# Patient Record
Sex: FEMALE | Race: WHITE | Employment: OTHER | URBAN - METROPOLITAN AREA
[De-identification: names, ages, dates, MRNs, and addresses within clinical notes are randomized per-mention and may not be internally consistent; named-entity substitution may affect disease eponyms.]

---

## 2017-01-02 ENCOUNTER — OFFICE VISIT (OUTPATIENT)
Dept: INTERNAL MEDICINE | Facility: CLINIC | Age: 72
End: 2017-01-02
Payer: COMMERCIAL

## 2017-01-02 VITALS
SYSTOLIC BLOOD PRESSURE: 110 MMHG | DIASTOLIC BLOOD PRESSURE: 70 MMHG | WEIGHT: 159 LBS | OXYGEN SATURATION: 98 % | TEMPERATURE: 98 F | HEIGHT: 66 IN | HEART RATE: 83 BPM | BODY MASS INDEX: 25.55 KG/M2

## 2017-01-02 DIAGNOSIS — Z01.818 PREOPERATIVE EXAMINATION: Primary | ICD-10-CM

## 2017-01-02 PROCEDURE — 99214 OFFICE O/P EST MOD 30 MIN: CPT | Performed by: INTERNAL MEDICINE

## 2017-01-02 NOTE — PROGRESS NOTES
[place] [address]  [facility] [address]  [phone]  Dept: [phone]    PRE-OP EVALUATION:  Today's date: 2017    Emma Jenkins (: 1945) presents for pre-operative evaluation assessment as requested by Dr. Holt.  She requires evaluation and anesthesia risk assessment prior to undergoing surgery/procedure for treatment of eye .  Proposed procedure: BILATERAL BLEPHAROPLASTY     Date of Surgery/ Procedure: 17  Time of Surgery/ Procedure: [facility]Surgical Facility: [facility]  Primary Physician: Gene Scott  Type of Anesthesia Anticipated: Retrobulbar    Patient has a Health Care Directive or Living Will:  NO    1. NO - Do you have a history of heart attack, stroke, stent, bypass or surgery on an artery in the head, neck, heart or legs?  2. NO - Do you ever have any pain or discomfort in your chest?  3. NO - Do you have a history of  Heart Failure?  4. NO - Are you troubled by shortness of breath when: walking on the level, up a slight hill or at night?  5. NO - Do you currently have a cold, bronchitis or other respiratory infection?  6. NO - Do you have a cough, shortness of breath or wheezing?  7. NO - Do you sometimes get pains in the calves of your legs when you walk?  8. NO - Do you or anyone in your family have previous history of blood clots?  9. NO - Do you or does anyone in your family have a serious bleeding problem such as prolonged bleeding following surgeries or cuts?  10. NO - Have you ever had problems with anemia or been told to take iron pills?  11. NO - Have you had any abnormal blood loss such as black, tarry or bloody stools, or abnormal vaginal bleeding?  12. NO - Have you ever had a blood transfusion?  13. NO - Have you or any of your relatives ever had problems with anesthesia?  14. NO - Do you have sleep apnea, excessive snoring or daytime drowsiness?  15. NO - Do you have any prosthetic heart valves?  16. NO - Do you have  prosthetic joints?  17. NO - Is there any chance that you may be pregnant?      HPI:                                                      Brief HPI related to upcoming procedure:    See problem list for active medical problems.  Problems all longstanding and stable, except as noted/documented.  See ROS for pertinent symptoms related to these conditions.                                                                                                  .    MEDICAL HISTORY:                                                      Patient Active Problem List    Diagnosis Date Noted     RA (rheumatoid arthritis) (H) 10/17/2016     Priority: Medium     Major depression, recurrent (H) 10/17/2016     Priority: Medium     Low back pain 08/18/2016     Priority: Medium      Past Medical History   Diagnosis Date     Arthritis      Anxiety and depression      GERD (gastroesophageal reflux disease)      Heart arrhythmias      Past Surgical History   Procedure Laterality Date     Orthopedic surgery       Gyn surgery       Appendectomy       Remove hardware hand  10/7/2011     Procedure:REMOVE HARDWARE HAND; Right Index Finger K-Wire Removal ; Surgeon:KELSEY ALAS; Location:[facility]     Current Outpatient Prescriptions   Medication Sig Dispense Refill     methocarbamol (ROBAXIN) 750 MG tablet Take 1 tablet (750 mg) by mouth 3 times daily as needed (muscle spasm) 30 tablet 1     oxyCODONE-acetaminophen (PERCOCET) 5-325 MG per tablet Take 1-2 tablets by mouth every 4 hours as needed for moderate to severe pain 15 tablet 0     calcium carb 1250 mg, 500 mg [place],/vitamin D 200 units (OSCAL WITH D) 500-200 MG-UNIT per tablet Take 2 tablets by mouth every morning       metoprolol (LOPRESSOR) 25 MG tablet Take 25 mg by mouth every morning       multivitamin, therapeutic (THERA-VIT) TABS Take 1 tablet by mouth daily       Omega-3 Fatty Acids (OMEGA-3 FISH OIL PO) Take 1 g by mouth daily        Cholecalciferol (VITAMIN D3 PO) Take 400  "Units by mouth daily        metoprolol (LOPRESSOR) 50 MG tablet Take 50 mg by mouth every evening        leflunomide (ARAVA) 10 MG tablet Take 20 mg by mouth every other day        citalopram (CELEXA) 40 MG tablet Take 40 mg by mouth daily.       TRAZodone (DESYREL) 75 MG TABS Take 75 mg by mouth At Bedtime.       etanercept (ENBREL) 50 MG/ML injection Inject 50 mg Subcutaneous once a week On Saturdays       LANsoprazole (PREVACID) 30 MG capsule Take 30 mg by mouth daily.       OTC products: None, except as noted above    No Known Allergies   Latex Allergy: NO    Social History   Substance Use Topics     Smoking status: Former Smoker -- 10 years     Smokeless tobacco: Never Used     Alcohol Use: 2.5 oz/week     5 Glasses of wine per week      Comment: rare     History   Drug Use No       REVIEW OF SYSTEMS:                                                    C: NEGATIVE for fever, chills, change in weight  I: NEGATIVE for worrisome rashes, moles or lesions  E: NEGATIVE for vision changes or irritation  E/M: NEGATIVE for ear, mouth and throat problems  R: NEGATIVE for significant cough or SOB  B: NEGATIVE for masses, tenderness or discharge  CV: NEGATIVE for chest pain, palpitations or peripheral edema  GI: NEGATIVE for nausea, abdominal pain, heartburn, or change in bowel habits  : NEGATIVE for frequency, dysuria, or hematuria  M: NEGATIVE for significant arthralgias or myalgia  N: NEGATIVE for weakness, dizziness or paresthesias  E: NEGATIVE for temperature intolerance, skin/hair changes  H: NEGATIVE for bleeding problems  P: NEGATIVE for changes in mood or affect    EXAM:                                                    There were no vitals taken for this visit.   /70 mmHg  Pulse 83  Temp(Src) 98  F (36.7  C) (Oral)  Ht 5' 5.5\" (1.664 m)  Wt 159 lb (72.122 kg)  BMI 26.05 kg/m2  SpO2 98%  Breastfeeding? No      GENERAL APPEARANCE: healthy, alert and no distress     EYES: EOMI,- PERRL     HENT: ear " canals and TM's normal and nose and mouth without ulcers or lesions     NECK: no adenopathy, no asymmetry, masses, or scars and thyroid normal to palpation     RESP: lungs clear to auscultation - no rales, rhonchi or wheezes     CV: regular rates and rhythm, normal S1 S2, no S3 or S4 and no murmur, click or rub -     ABDOMEN:  soft, nontender, no HSM or masses and bowel sounds normal     MS: extremities normal- no gross deformities noted, no evidence of inflammation in joints, FROM in all extremities.     SKIN: no suspicious lesions or rashes     NEURO: Normal strength and tone, sensory exam grossly normal, mentation intact and speech normal     PSYCH: mentation appears normal. and affect normal/bright        DIAGNOSTICS:                                                    No labs or EKG required for low risk surgery (cataract, skin procedure, breast biopsy, etc)    Recent Labs   Lab Test  08/20/14 2000 03/16/14   1650   HGB  12.6  13.0   PLT  209  225   NA  139  133   POTASSIUM  4.3  4.3   CR  0.74  0.76        IMPRESSION:                                                    Reason for surgery/procedure: BILATERAL BLEPHAROPLASTY   Diagnosis/reason for consult: risk assessment    The proposed surgical procedure is considered LOW risk.    REVISED CARDIAC RISK INDEX  The patient has the following serious cardiovascular risks for perioperative complications such as (MI, PE, VFib and 3  AV Block):  No serious cardiac risks  INTERPRETATION: 0 risks: Class I (very low risk - 0.4% complication rate)    The patient has the following additional risks for perioperative complications:  No identified additional risks    No diagnosis found.    RECOMMENDATIONS:                                                        APPROVAL GIVEN to proceed with proposed procedure, without further diagnostic evaluation       Signed Electronically by: Angelic Montana MD    Copy of this evaluation report is provided to requesting  physician.    [facility] Preop Guidelines

## 2017-01-02 NOTE — NURSING NOTE
"Chief Complaint   Patient presents with     Pre Op Exam     1/9/17 eye surgery, Dr Holt       Initial /70 mmHg  Pulse 83  Temp(Src) 98  F (36.7  C) (Oral)  Ht 5' 5.5\" (1.664 m)  Wt 159 lb (72.122 kg)  BMI 26.05 kg/m2  SpO2 98%  Breastfeeding? No Estimated body mass index is 26.05 kg/(m^2) as calculated from the following:    Height as of this encounter: 5' 5.5\" (1.664 m).    Weight as of this encounter: 159 lb (72.122 kg).  BP completed using cuff size: deepak Peoples CMA      "

## 2017-01-03 ENCOUNTER — TELEPHONE (OUTPATIENT)
Dept: INTERNAL MEDICINE | Facility: CLINIC | Age: 72
End: 2017-01-03

## 2017-01-03 NOTE — TELEPHONE ENCOUNTER
Pt left voice message. She saw Dr. Montana for pre-op appt 1/2/17 and asked if we were sent her records from Dr. Malone at [facility]. Pt states she was told to call our office today to discuss this.     Dr. Monatna - unsure if you wanted pt to have our staff follow up on this or not?

## 2017-01-04 NOTE — TELEPHONE ENCOUNTER
I believe we requested them from the [facility] was working on this on Monday.  Are we able to confirm this is correct place to obtain these records?    thanks

## 2017-01-04 NOTE — TELEPHONE ENCOUNTER
Spoke to Carmen, she did send [employer] for records on Monday (they are being held by [facility]).    Attempted to contact pt. Left voice message to call back.

## 2017-01-04 NOTE — TELEPHONE ENCOUNTER
Pt left voice message returning phone call.     Contacted pt and informed her records have been requested for her.

## 2017-01-08 ENCOUNTER — ANESTHESIA EVENT (OUTPATIENT)
Dept: SURGERY | Facility: CLINIC | Age: 72
End: 2017-01-08
Payer: MEDICARE

## 2017-01-09 ENCOUNTER — ANESTHESIA (OUTPATIENT)
Dept: SURGERY | Facility: CLINIC | Age: 72
End: 2017-01-09
Payer: MEDICARE

## 2017-01-09 PROCEDURE — 25000125 ZZHC RX 250: Performed by: NURSE ANESTHETIST, CERTIFIED REGISTERED

## 2017-01-09 PROCEDURE — 25000128 H RX IP 250 OP 636: Performed by: NURSE ANESTHETIST, CERTIFIED REGISTERED

## 2017-01-09 RX ORDER — FENTANYL CITRATE 50 UG/ML
INJECTION, SOLUTION INTRAMUSCULAR; INTRAVENOUS PRN
Status: DISCONTINUED | OUTPATIENT
Start: 2017-01-09 | End: 2017-01-09

## 2017-01-09 RX ORDER — ONDANSETRON 2 MG/ML
INJECTION INTRAMUSCULAR; INTRAVENOUS PRN
Status: DISCONTINUED | OUTPATIENT
Start: 2017-01-09 | End: 2017-01-09

## 2017-01-09 RX ORDER — PROPOFOL 10 MG/ML
INJECTION, EMULSION INTRAVENOUS PRN
Status: DISCONTINUED | OUTPATIENT
Start: 2017-01-09 | End: 2017-01-09

## 2017-01-09 RX ADMIN — MIDAZOLAM HYDROCHLORIDE 1 MG: 1 INJECTION, SOLUTION INTRAMUSCULAR; INTRAVENOUS at 09:11

## 2017-01-09 RX ADMIN — ONDANSETRON 4 MG: 2 INJECTION INTRAMUSCULAR; INTRAVENOUS at 09:11

## 2017-01-09 RX ADMIN — FENTANYL CITRATE 50 MCG: 50 INJECTION, SOLUTION INTRAMUSCULAR; INTRAVENOUS at 09:11

## 2017-01-09 RX ADMIN — PROPOFOL 30 MG: 10 INJECTION, EMULSION INTRAVENOUS at 09:12

## 2017-01-09 RX ADMIN — DEXMEDETOMIDINE 4 MCG: 100 INJECTION, SOLUTION, CONCENTRATE INTRAVENOUS at 09:11

## 2017-01-09 ASSESSMENT — ENCOUNTER SYMPTOMS
ORTHOPNEA: 0
SEIZURES: 0

## 2017-01-09 NOTE — ANESTHESIA POSTPROCEDURE EVALUATION
Patient: Emma Jenkins    BLEPHAROPLASTY BILATERAL (Bilateral Eye)  Additional InformationProcedure(s):  BILATERAL BLEPHAROPLASTY - Wound Class: I-Clean    Diagnosis:PTOSIS  Diagnosis Additional Information: No value filed.    Anesthesia Type:  MAC    Note:  Anesthesia Post Evaluation    Patient location during evaluation: PACU  Patient participation: Able to fully participate in evaluation  Level of consciousness: awake  Pain management: adequate  Airway patency: patent  Cardiovascular status: acceptable  Respiratory status: acceptable  Hydration status: acceptable  PONV: none     Anesthetic complications: None          Last vitals:  Filed Vitals:    01/09/17 0950 01/09/17 0957 01/09/17 1010   BP: 133/79 116/81 119/73   Temp:      Resp: 15 15 15   SpO2: 95% 93% 97%       Electronically Signed By: Bharat Pillai MD  January 9, 2017  10:29 AM

## 2017-01-09 NOTE — ANESTHESIA PREPROCEDURE EVALUATION
Procedure: Procedure(s):  BLEPHAROPLASTY BILATERAL  Preop diagnosis: PTOSIS  No Known Allergies  Patient Active Problem List   Diagnosis     Low back pain     RA (rheumatoid arthritis) (H)     Major depression, recurrent (H)     Past Medical History   Diagnosis Date     Anxiety and depression      GERD (gastroesophageal reflux disease)      Heart arrhythmias      Arthritis      rheumatoid arthritis     Past Surgical History   Procedure Laterality Date     Appendectomy       Orthopedic surgery       Remove hardware hand  10/7/2011     Procedure:REMOVE HARDWARE HAND; Right Index Finger K-Wire Removal ; Surgeon:KELSEY ALAS; Location: GI     Gyn surgery       hysterectomy       No current facility-administered medications on file prior to encounter.  Current Outpatient Prescriptions on File Prior to Encounter:  calcium carb 1250 mg, 500 mg [place],/vitamin D 200 units (OSCAL WITH D) 500-200 MG-UNIT per tablet Take 2 tablets by mouth every morning   metoprolol (LOPRESSOR) 25 MG tablet Take 25 mg by mouth every morning   multivitamin, therapeutic (THERA-VIT) TABS Take 1 tablet by mouth daily   Omega-3 Fatty Acids (OMEGA-3 FISH OIL PO) Take 1 g by mouth daily    Cholecalciferol (VITAMIN D3 PO) Take 400 Units by mouth daily    metoprolol (LOPRESSOR) 50 MG tablet Take 50 mg by mouth every evening    leflunomide (ARAVA) 10 MG tablet Take 20 mg by mouth every other day    citalopram (CELEXA) 40 MG tablet Take 40 mg by mouth daily.   TRAZodone (DESYREL) 75 MG TABS Take 75 mg by mouth At Bedtime.   etanercept (ENBREL) 50 MG/ML injection Inject 50 mg Subcutaneous once a week On Saturdays   LANsoprazole (PREVACID) 30 MG capsule Take 30 mg by mouth daily.   methocarbamol (ROBAXIN) 750 MG tablet Take 1 tablet (750 mg) by mouth 3 times daily as needed (muscle spasm)     /98 mmHg  Temp(Src) 36.3  C (97.3  F) (Temporal)  Resp 16  SpO2 99%    WBC      6.0   8/20/2014  RBC     4.37   8/20/2014  HGB     12.6    8/20/2014  HCT     37.6   8/20/2014  MCV       86   8/20/2014  MCH     28.8   8/20/2014  MCHC     33.5   8/20/2014  RDW     12.9   8/20/2014  PLT      209   8/20/2014  No results found for this basename: INR    Last Basic Metabolic Panel:  NA      139   8/20/2014   POTASSIUM      4.4   7/12/2016  CHLORIDE      107   8/20/2014  AILYN      9.0   8/20/2014  CO2       26   8/20/2014  BUN       15   8/20/2014  CR     0.76   7/12/2016  GLC       86   7/12/2016        Anesthesia Evaluation     . Pt has had prior anesthetic.     No history of anesthetic complications     ROS/MED HX    ENT/Pulmonary:  - neg pulmonary ROS   (+), recent URI resolved . .   (-) sleep apnea   Neurologic:  - neg neurologic ROS    (-) seizures, CVA and migraines   Cardiovascular: Comment: Normal stress echo 6/2015       (-) CHF and orthopnea/PND   METS/Exercise Tolerance:     Hematologic:  - neg hematologic  ROS       Musculoskeletal: Comment: Rheumatoid arthritis  Low back pain  (+) arthritis, , , -       GI/Hepatic:     (+) GERD       Renal/Genitourinary:         Endo:  - neg endo ROS    (-) Type II DM and thyroid disease   Psychiatric:     (+) psychiatric history anxiety and depression      Infectious Disease:  - neg infectious disease ROS       Malignancy:      - no malignancy   Other:               Physical Exam  Normal systems: cardiovascular, pulmonary and dental    Airway   Mallampati: II  TM distance: >3 FB  Neck ROM: full    Dental     Cardiovascular   Rhythm and rate: regular and normal      Pulmonary    breath sounds clear to auscultation                    Anesthesia Plan      History & Physical Review  History and physical reviewed and following examination; no interval change.    ASA Status:  2 .    NPO Status:  > 8 hours    Plan for MAC Reason for MAC:  Procedure to face, neck, head or breast  PONV prophylaxis:  Ondansetron (or other 5HT-3)  Surgery previously scheduled for Nov 2016, but cancelled because of URI and coughing       Postoperative Care      Consents  Anesthetic plan, risks, benefits and alternatives discussed with:  Patient and Spouse..                          .

## 2017-01-09 NOTE — ANESTHESIA CARE TRANSFER NOTE
Patient: Emma Jenkins    BLEPHAROPLASTY BILATERAL (Bilateral Eye)  Additional InformationProcedure(s):  BILATERAL BLEPHAROPLASTY - Wound Class: I-Clean    Diagnosis: PTOSIS  Diagnosis Additional Information: No value filed.    Anesthesia Type:   MAC     Note:  Airway :Room Air  Patient transferred to:PACU  Comments: Pt to recovery, room air, vss. Report to RN      Vitals: (Last set prior to Anesthesia Care Transfer)              Electronically Signed By: GLORIA Vanessa CRNA  January 9, 2017  9:53 AM

## 2017-02-02 ENCOUNTER — TELEPHONE (OUTPATIENT)
Dept: INTERNAL MEDICINE | Facility: CLINIC | Age: 72
End: 2017-02-02

## 2017-02-02 NOTE — TELEPHONE ENCOUNTER
Pt calling.  C/o flu-like symptoms:  abd pain, vomiting, diarrhea, little appetite x 1 wk.  Getting slowly better.    Advised BRAT diet and fluids.  If not better in a couple days, appt for eval.

## 2017-02-03 ENCOUNTER — OFFICE VISIT (OUTPATIENT)
Dept: INTERNAL MEDICINE | Facility: CLINIC | Age: 72
End: 2017-02-03
Payer: COMMERCIAL

## 2017-02-03 ENCOUNTER — RADIANT APPOINTMENT (OUTPATIENT)
Dept: GENERAL RADIOLOGY | Facility: CLINIC | Age: 72
End: 2017-02-03
Attending: INTERNAL MEDICINE
Payer: COMMERCIAL

## 2017-02-03 VITALS
BODY MASS INDEX: 26.98 KG/M2 | HEART RATE: 68 BPM | OXYGEN SATURATION: 95 % | WEIGHT: 158 LBS | HEIGHT: 64 IN | SYSTOLIC BLOOD PRESSURE: 130 MMHG | TEMPERATURE: 97.3 F | RESPIRATION RATE: 12 BRPM | DIASTOLIC BLOOD PRESSURE: 80 MMHG

## 2017-02-03 DIAGNOSIS — R10.84 ABDOMINAL PAIN, GENERALIZED: Primary | ICD-10-CM

## 2017-02-03 LAB
ALBUMIN UR-MCNC: NEGATIVE MG/DL
APPEARANCE UR: CLEAR
BASOPHILS # BLD AUTO: 0 10E9/L (ref 0–0.2)
BASOPHILS NFR BLD AUTO: 0.3 %
BILIRUB UR QL STRIP: NEGATIVE
COLOR UR AUTO: YELLOW
DIFFERENTIAL METHOD BLD: NORMAL
EOSINOPHIL # BLD AUTO: 0.2 10E9/L (ref 0–0.7)
EOSINOPHIL NFR BLD AUTO: 2.9 %
ERYTHROCYTE [DISTWIDTH] IN BLOOD BY AUTOMATED COUNT: 13.2 % (ref 10–15)
GLUCOSE UR STRIP-MCNC: NEGATIVE MG/DL
HCT VFR BLD AUTO: 40 % (ref 35–47)
HGB BLD-MCNC: 13 G/DL (ref 11.7–15.7)
HGB UR QL STRIP: NEGATIVE
KETONES UR STRIP-MCNC: NEGATIVE MG/DL
LEUKOCYTE ESTERASE UR QL STRIP: ABNORMAL
LYMPHOCYTES # BLD AUTO: 1.7 10E9/L (ref 0.8–5.3)
LYMPHOCYTES NFR BLD AUTO: 23.9 %
MCH RBC QN AUTO: 28.6 PG (ref 26.5–33)
MCHC RBC AUTO-ENTMCNC: 32.5 G/DL (ref 31.5–36.5)
MCV RBC AUTO: 88 FL (ref 78–100)
MONOCYTES # BLD AUTO: 0.8 10E9/L (ref 0–1.3)
MONOCYTES NFR BLD AUTO: 10.9 %
NEUTROPHILS # BLD AUTO: 4.3 10E9/L (ref 1.6–8.3)
NEUTROPHILS NFR BLD AUTO: 62 %
NITRATE UR QL: NEGATIVE
NON-SQ EPI CELLS #/AREA URNS LPF: ABNORMAL /LPF
PH UR STRIP: 6 PH (ref 5–7)
PLATELET # BLD AUTO: 230 10E9/L (ref 150–450)
RBC # BLD AUTO: 4.55 10E12/L (ref 3.8–5.2)
RBC #/AREA URNS AUTO: ABNORMAL /HPF (ref 0–2)
SP GR UR STRIP: 1.01 (ref 1–1.03)
URN SPEC COLLECT METH UR: ABNORMAL
UROBILINOGEN UR STRIP-ACNC: 0.2 EU/DL (ref 0.2–1)
WBC # BLD AUTO: 6.9 10E9/L (ref 4–11)
WBC #/AREA URNS AUTO: ABNORMAL /HPF (ref 0–2)

## 2017-02-03 PROCEDURE — 80053 COMPREHEN METABOLIC PANEL: CPT | Performed by: INTERNAL MEDICINE

## 2017-02-03 PROCEDURE — 36415 COLL VENOUS BLD VENIPUNCTURE: CPT | Performed by: INTERNAL MEDICINE

## 2017-02-03 PROCEDURE — 85025 COMPLETE CBC W/AUTO DIFF WBC: CPT | Performed by: INTERNAL MEDICINE

## 2017-02-03 PROCEDURE — 81001 URINALYSIS AUTO W/SCOPE: CPT | Performed by: INTERNAL MEDICINE

## 2017-02-03 PROCEDURE — 74020 XR ABDOMEN 2 VW: CPT

## 2017-02-03 PROCEDURE — 99214 OFFICE O/P EST MOD 30 MIN: CPT | Performed by: INTERNAL MEDICINE

## 2017-02-03 NOTE — NURSING NOTE
"Chief Complaint   Patient presents with     Abdominal Pain     GI issues. Vomiting a 5 days ago. Loose stools also. Feeling somewhat better today but sx have been present all week. Hx        Initial /80 mmHg  Pulse 68  Temp(Src) 97.3  F (36.3  C) (Oral)  Resp 12  Ht 5' 4\" (1.626 m)  Wt 158 lb (71.668 kg)  BMI 27.11 kg/m2  SpO2 95% Estimated body mass index is 27.11 kg/(m^2) as calculated from the following:    Height as of this encounter: 5' 4\" (1.626 m).    Weight as of this encounter: 158 lb (71.668 kg).  BP completed using cuff size: deepak Cancino LPN      "

## 2017-02-03 NOTE — PATIENT INSTRUCTIONS
"Based on the X-ray and your recent history, my biggest suspicion is that you might be \"backed up\" with stool, even in spite of the recent diarrhea.     If possible, a \"Fleets enema\" that can be bought at any pharmacy might be helpful.   Otherwise, recommend aggressive use of Miralax, (4 times a day or more), until you have a large bowel movement.     If you identify the med that your sister is using with benefit, call us with this information.     If pains worsen or fail to improve, we may need to either see you back in the office, or order some additional tests first.   "

## 2017-02-03 NOTE — Clinical Note
"[facility]  [address] [place] [address]  [phone]    2/6/2017    Emma Jenkins  [address] [facility] [address]           Dear Ms. Jenkins,    The results of your lab tests are enclosed. Everything looks fine. Unless noted otherwise below, any results that are outside the \"normal\" range are within acceptable limits and are of no concern.    Your hemoglobin, white blood cell count, and platelet count looked fine.  Hemoglobin measures the amount of red blood cells carrying oxygen to the body's tissues. A low hemoglobin can cause symptoms of fatigue.  WBC Count measures White Blood Cells, the cells that fight infection. The percentages of various types of white blood cells are listed and look fine.  Platelets assist in normal blood clotting. Your platelet count looks normal.    AST and ALT are liver tests, as are the bilirubin (total and direct), albumin, total protein, and alkaline phosphatase. Yours are all normal.     Urea Nitrogen and Creatinine are kidney tests--yours are normal. GFR stands for Glomerular Filtration Rate, a more precise estimate of kidney function.    Sodium, Potassium, Chloride, Carbon Dioxide, and Calcium are all normal salts in the bloodstream. Yours all look normal. Your glucose (blood sugar) also looks fine. (You can ignore the anion gap result).    Your urinalysis (urine study) results were normal, showing for example no sugar, blood or infection in the urine.        If you have any further questions or problems, please contact our office.    Sincerely,      JULEE CONLEY M.D.  Attachment: Lab results     "

## 2017-02-03 NOTE — MR AVS SNAPSHOT
"              After Visit Summary   2/3/2017    Emma Jenkins    MRN: [id number]           Patient Information     Date Of Birth          1945        Visit Information        Provider Department      2/3/2017 2:20 PM Gene Scott MD [facility]        Today's Diagnoses     Abdominal pain, generalized    -  1       Care Instructions    Based on the X-ray and your recent history, my biggest suspicion is that you might be \"backed up\" with stool, even in spite of the recent diarrhea.     If possible, a \"Fleets enema\" that can be bought at any pharmacy might be helpful.   Otherwise, recommend aggressive use of Miralax, (4 times a day or more), until you have a large bowel movement.     If you identify the med that your sister is using with benefit, call us with this information.     If pains worsen or fail to improve, we may need to either see you back in the office, or order some additional tests first.         Follow-ups after your visit        Who to contact     If you have questions or need follow up information about today's clinic visit or your schedule please contact [facility] directly at [phone].  Normal or non-critical lab and imaging results will be communicated to you by [employer], letter or phone within 4 business days after the clinic has received the results. If you do not hear from us within 7 days, please contact the clinic through [employer] or phone. If you have a critical or abnormal lab result, we will notify you by phone as soon as possible.  Submit refill requests through [employer] or call your pharmacy and they will forward the refill request to us. Please allow 3 business days for your refill to be completed.          Additional Information About Your Visit        MyChart Information     [employer] lets you send messages to your doctor, view your test results, renew your prescriptions, schedule appointments and more. To sign up, go to " "[email or web address] . Click on \"Log in\" on the left side of the screen, which will take you to the Welcome page. Then click on \"Sign up Now\" on the right side of the page.     You will be asked to enter the access code listed below, as well as some personal information. Please follow the directions to create your username and password.     Your access code is: [email or web address]  Expires: 2017 10:14 AM     Your access code will  in 90 days. If you need help or a new code, please call your [facility] or [phone].        Care EveryWhere ID     This is your Care EveryWhere ID. This could be used by other organizations to access your [place] medical records  [id number]        Your Vitals Were     Pulse Temperature Respirations Height BMI (Body Mass Index) Pulse Oximetry    68 97.3  F (36.3  C) (Oral) 12 1.626 m (5' 4\") 27.11 kg/m2 95%       Blood Pressure from Last 3 Encounters:   17 130/80   17 119/73   17 110/70    Weight from Last 3 Encounters:   17 71.668 kg (158 lb)   17 72.122 kg (159 lb)   10/17/16 73.619 kg (162 lb 4.8 oz)              We Performed the Following     CBC with platelets and differential     Comprehensive metabolic panel (BMP + Alb, Alk Phos, ALT, AST, Total. Bili, TP)     UA with Microscopic reflex to Culture     XR Abdomen 2 Views        Primary Care Provider Office Phone # Fax #    Gene Scott [phone]       [facility] [address]  [facility] [address]        Thank you!     Thank you for choosing [facility]  for your care. Our goal is always to provide you with excellent care. Hearing back from our patients is one way we can continue to improve our services. Please take a few minutes to complete the written survey that you may receive in the mail after your visit with us. Thank you!             Your Updated Medication List - Protect others around you: Learn how to safely use, store " and throw away your medicines at www.disposemymeds.org.          This list is accurate as of: 2/3/17  4:18 PM.  Always use your most recent med list.                   Brand Name Dispense Instructions for use    calcium carb 1250 mg (500 mg [place])/vitamin D 200 units 500-200 MG-UNIT per tablet    OSCAL with D     Take 2 tablets by mouth every morning       citalopram 40 MG tablet    celeXA     Take 40 mg by mouth daily.       etanercept 50 MG/ML injection    ENBREL     Inject 50 mg Subcutaneous once a week On Saturdays       leflunomide 10 MG tablet    ARAVA     Take 20 mg by mouth every other day       * metoprolol 25 MG tablet    LOPRESSOR     Take 25 mg by mouth every morning       * metoprolol 50 MG tablet    LOPRESSOR     Take 50 mg by mouth every evening       multivitamin, therapeutic Tabs tablet      Take 1 tablet by mouth daily       OMEGA-3 FISH OIL PO      Take 1 g by mouth daily       PREVACID 30 MG CR capsule   Generic drug:  LANsoprazole      Take 30 mg by mouth daily.       traZODone 75 MG Tabs half-tab    DESYREL     Take 75 mg by mouth At Bedtime.       VITAMIN D3 PO      Take 400 Units by mouth daily       * Notice:  This list has 2 medication(s) that are the same as other medications prescribed for you. Read the directions carefully, and ask your doctor or other care provider to review them with you.

## 2017-02-03 NOTE — PROGRESS NOTES
SUBJECTIVE:                                                    Emma Jenkins is a 71 year old female who presents to clinic today for the following health issues:  Persistent abdominal pain.    Chief Complaint   Patient presents with     Abdominal Pain     GI issues. Vomiting a 5 days ago. Loose stools also. Feeling somewhat better today but sx have been present all week. Hx        Abdominal Pain:    Patient has family hx of colon cancer. She has a colonoscopy done every 5 years.    Patient has hx of constipation. She has been taking miralax daily for many years. Emma was in [place] for eight days and returned last Thursday (1/26/17). She forgot to take her miralax for 8 days. Patient started using miralax again daily on(1/26/17). Her symptoms of diarrhea, vomiting and bloating started last Sunday(1/29/17). She has had a little bit of improvement in her symptoms throughout the week. Since Wednesday, her BM's have been small and yellowish/brown in color. She reports no BM today. Patient has discomfort in her mid to lower abdomen when she eats any food. Eating has not caused any vomiting. She had chills but no fever. If she sits and doesn't do anything she feels okay.     Patient notes her sister has similar GI issues. She doesn't believe she has been around any sick people over the past few weeks.    Patient doesn't use ibuprofen or aleve. Patient has had appendectomy in past. Mother had diverticulitis.     ROS:  C: NEGATIVE for fever POSITIVE for chills   GI: POSITIVE for heartburn, abdominal pain, change in bowel habits NEGATIVE for nausea   : NEGATIVE for frequency, dysuria, or hematuria     Past/recent records reviewed and discussed for --   Colonoscopy- last was in 2013. Due every 5 years   Family hx    Problem list and histories reviewed & adjusted, as indicated.    Problem list, Medication list, Allergies, and Medical/Social/Surgical histories reviewed in EPIC and updated as  "appropriate    Additional history: as documented    OBJECTIVE:                                                    /80 mmHg  Pulse 68  Temp(Src) 97.3  F (36.3  C) (Oral)  Resp 12  Ht 1.626 m (5' 4\")  Wt 71.668 kg (158 lb)  BMI 27.11 kg/m2  SpO2 95%  Body mass index is 27.11 kg/(m^2).  GENERAL: healthy, alert and no distress  EYES: Eyes grossly normal to inspection, PERRL and conjunctivae and sclerae normal  NECK: no adenopathy, no asymmetry, masses, or scars and thyroid normal to palpation  RESP: lungs clear to auscultation - no rales, rhonchi or wheezes  CV: regular rate and rhythm, normal S1 S2, no S3 or S4, no murmur, click or rub, no peripheral edema and peripheral pulses strong  ABDOMEN: soft, nontender, no hepatosplenomegaly, no masses and bowel sounds normal  NEURO: Normal strength and tone, mentation intact and speech normal  PSYCH: mentation appears normal, affect normal/bright  LYMPH: no cervical or inguinal adenopathy         Abdominal X-rays (2-view): Reviewed with patient. Significant amount of retained stool. Otherwise nonspecific gas pattern, no free air.    UA: negative for leukocyte esterase, nitrites, and 0-2 WBC's/HPF.     WBC      6.9   2/3/2017  RBC     4.55   2/3/2017  HGB     13.0   2/3/2017  HCT     40.0   2/3/2017  No components found with this name: mct  MCV       88   2/3/2017  MCH     28.6   2/3/2017  MCHC     32.5   2/3/2017  RDW     13.2   2/3/2017  PLT      230   2/3/2017     ASSESSMENT/PLAN:                                                      (R10.84) Abdominal pain, generalized  (primary encounter diagnosis)  Comment: Suspect a functional process, likely obstipation, in spite of recent diarrhea. Advised use of outpatient enema, increased use of Miralax until satisfactory BM. Consider further workup if symptoms persist.   Plan: Comprehensive metabolic panel (BMP + Alb, Alk         Phos, ALT, AST, Total. Bili, TP), CBC with         platelets and differential, UA with " "Microscopic        reflex to Culture, XR Abdomen 2 Views    Patient Instructions   Based on the X-ray and your recent history, my biggest suspicion is that you might be \"backed up\" with stool, even in spite of the recent diarrhea.     If possible, a \"Fleets enema\" that can be bought at any pharmacy might be helpful.   Otherwise, recommend aggressive use of Miralax, (4 times a day or more), until you have a large bowel movement.     If you identify the med that your sister is using with benefit, call us with this information.     If pains worsen or fail to improve, we may need to either see you back in the office, or order some additional tests first.     Gene Scott MD  [facility]    This document serves as a record of the services and decisions personally performed and made by Gene Scott MD. It was created on their behalf by Marisa Siddiqui, a trained medical scribe. The creation of this document is based the provider's statements to the medical scribe.  Marisa Siddiqui February 3, 2017 2:25 PM      "

## 2017-02-04 LAB
ALBUMIN SERPL-MCNC: 3.8 G/DL (ref 3.4–5)
ALP SERPL-CCNC: 85 U/L (ref 40–150)
ALT SERPL W P-5'-P-CCNC: 34 U/L (ref 0–50)
ANION GAP SERPL CALCULATED.3IONS-SCNC: 5 MMOL/L (ref 3–14)
AST SERPL W P-5'-P-CCNC: 21 U/L (ref 0–45)
BILIRUB SERPL-MCNC: 0.5 MG/DL (ref 0.2–1.3)
BUN SERPL-MCNC: 11 MG/DL (ref 7–30)
CALCIUM SERPL-MCNC: 9.3 MG/DL (ref 8.5–10.1)
CHLORIDE SERPL-SCNC: 101 MMOL/L (ref 94–109)
CO2 SERPL-SCNC: 28 MMOL/L (ref 20–32)
CREAT SERPL-MCNC: 0.75 MG/DL (ref 0.52–1.04)
GFR SERPL CREATININE-BSD FRML MDRD: 76 ML/MIN/1.7M2
GLUCOSE SERPL-MCNC: 86 MG/DL (ref 70–99)
POTASSIUM SERPL-SCNC: 5.1 MMOL/L (ref 3.4–5.3)
PROT SERPL-MCNC: 7.1 G/DL (ref 6.8–8.8)
SODIUM SERPL-SCNC: 134 MMOL/L (ref 133–144)

## 2017-03-10 ENCOUNTER — TRANSFERRED RECORDS (OUTPATIENT)
Dept: HEALTH INFORMATION MANAGEMENT | Facility: CLINIC | Age: 72
End: 2017-03-10

## 2017-03-10 LAB
ALT SERPL-CCNC: 27 IU/L (ref 5–35)
AST SERPL-CCNC: 28 U/L (ref 5–34)
CREAT SERPL-MCNC: 0.69 MG/DL (ref 0.5–1.3)
GFR SERPL CREATININE-BSD FRML MDRD: 88.9 ML/MIN/1.73M2

## 2017-04-03 ENCOUNTER — OFFICE VISIT (OUTPATIENT)
Dept: INTERNAL MEDICINE | Facility: CLINIC | Age: 72
End: 2017-04-03
Payer: COMMERCIAL

## 2017-04-03 VITALS
WEIGHT: 160 LBS | OXYGEN SATURATION: 94 % | HEART RATE: 79 BPM | DIASTOLIC BLOOD PRESSURE: 80 MMHG | TEMPERATURE: 98.2 F | HEIGHT: 66 IN | SYSTOLIC BLOOD PRESSURE: 120 MMHG | BODY MASS INDEX: 25.71 KG/M2

## 2017-04-03 DIAGNOSIS — K64.4 EXTERNAL HEMORRHOIDS: Primary | ICD-10-CM

## 2017-04-03 PROCEDURE — 99213 OFFICE O/P EST LOW 20 MIN: CPT | Performed by: INTERNAL MEDICINE

## 2017-04-03 RX ORDER — HYDROCORTISONE ACETATE 25 MG/1
25 SUPPOSITORY RECTAL 2 TIMES DAILY
Qty: 14 SUPPOSITORY | Refills: 0 | Status: SHIPPED | OUTPATIENT
Start: 2017-04-03 | End: 2017-06-20

## 2017-04-03 NOTE — PROGRESS NOTES
SUBJECTIVE:                                                    Emma Jenkins is a 71 year old female who presents to clinic today for the following health issues:    Pt is a 71 year old female who is seen here to day with c/o lump in anal area since 1 mth, firm to hard, has discomfort/pain with sitting, no pain with BM, stools area soft and thin,no blood in stool.  F/h of colon cancer- brother and mother. Last colonoscopy 2013, due 2018.       Current Outpatient Prescriptions   Medication Sig Dispense Refill     hydrocortisone (ANUSOL-HC) 25 MG Suppository Place 1 suppository (25 mg) rectally 2 times daily 14 suppository 0     calcium carb 1250 mg, 500 mg [place],/vitamin D 200 units (OSCAL WITH D) 500-200 MG-UNIT per tablet Take 2 tablets by mouth every morning       metoprolol (LOPRESSOR) 25 MG tablet Take 25 mg by mouth every morning       multivitamin, therapeutic (THERA-VIT) TABS Take 1 tablet by mouth daily       Omega-3 Fatty Acids (OMEGA-3 FISH OIL PO) Take 1 g by mouth daily        Cholecalciferol (VITAMIN D3 PO) Take 400 Units by mouth daily        metoprolol (LOPRESSOR) 50 MG tablet Take 50 mg by mouth every evening        leflunomide (ARAVA) 10 MG tablet Take 20 mg by mouth every other day        citalopram (CELEXA) 40 MG tablet Take 40 mg by mouth daily.       TRAZodone (DESYREL) 75 MG TABS Take 75 mg by mouth At Bedtime.       etanercept (ENBREL) 50 MG/ML injection Inject 50 mg Subcutaneous once a week On Saturdays       LANsoprazole (PREVACID) 30 MG capsule Take 30 mg by mouth daily.         Reviewed and updated as needed this visit by clinical staff  Allergies  Meds       Reviewed and updated as needed this visit by Provider         ROS:  C: NEGATIVE for fever, chills, change in weight  GI: NEGATIVE for nausea, abdominal pain, heartburn, or change in bowel habits  : lump in anal area    OBJECTIVE:                                                    /80 (BP Location: Left arm, Cuff Size:  "Adult Regular)  Pulse 79  Temp 98.2  F (36.8  C) (Oral)  Ht 5' 5.5\" (1.664 m)  Wt 160 lb (72.6 kg)  SpO2 94%  Breastfeeding? No  BMI 26.22 kg/m2  Body mass index is 26.22 kg/(m^2).   GENERAL: healthy, alert, well nourished, well hydrated, no distress  RECTAL- one external hemorrhoid noted , pain with rectal exam,        ASSESSMENT/PLAN:                                                      1. External hemorrhoids  --recommended  SITZ bath. Started on  hydrocortisone (ANUSOL-HC) 25 MG Suppository; Place 1 suppository (25 mg) rectally 2 times daily  Dispense: 14 suppository; Refill: 0  - referred to COLORECTAL SURGERY REFERRAL       Yann Sebastian MD  [facility]    "

## 2017-04-03 NOTE — NURSING NOTE
"Chief Complaint   Patient presents with     Sore     on buttocks around rectum for about 1 month       Initial /80 (BP Location: Left arm, Cuff Size: Adult Regular)  Pulse 79  Temp 98.2  F (36.8  C) (Oral)  Ht 5' 5.5\" (1.664 m)  Wt 160 lb (72.6 kg)  SpO2 94%  Breastfeeding? No  BMI 26.22 kg/m2 Estimated body mass index is 26.22 kg/(m^2) as calculated from the following:    Height as of this encounter: 5' 5.5\" (1.664 m).    Weight as of this encounter: 160 lb (72.6 kg).  Medication Reconciliation: complete   Carmen Peoples CMA      "

## 2017-04-03 NOTE — MR AVS SNAPSHOT
After Visit Summary   4/3/2017    Emma Jenkins    MRN: [id number]           Patient Information     Date Of Birth          1945        Visit Information        Provider Department      4/3/2017 4:00 PM Yann Sebastian MD [facility]        Today's Diagnoses     External hemorrhoids    -  1       Follow-ups after your visit        Additional Services     COLORECTAL SURGERY REFERRAL       Your provider has referred you to: [facility]: Colon and Rectal Surgery [facility] [phone]   [email or web address]    Referral Reason(s): Hemorrhoids  Special Concerns: None  This referral is: Elective (week +)  It is OK to leave a message on patient's voicemail.    Please be aware that coverage of these services is subject to the terms and limitations of your health insurance plan.  Call member services at your health plan with any benefit or coverage questions.      Please bring the following with you to your appointment:    (1) Any X-Rays, CTs or MRIs which have been performed.  Contact the facility where they were done to arrange for  prior to your scheduled appointment.    (2) List of current medications  (3) This referral request   (4) Any documents/labs given to you for this referral                  Your next 10 appointments already scheduled     Jul 18, 2017  9:00 AM CDT   PHYSICAL with Gene Scott MD   [facility] ([facility])    [facility] [address] [facility] [id number] [phone]              Who to contact     If you have questions or need follow up information about today's clinic visit or your schedule please contact [facility] directly at [phone].  Normal or non-critical lab and imaging results will be communicated to you by MyChart, letter or phone within 4 business days after the clinic has received the results. If you do not hear from  "us within 7 days, please contact the clinic through [employer] or phone. If you have a critical or abnormal lab result, we will notify you by phone as soon as possible.  Submit refill requests through [employer] or call your pharmacy and they will forward the refill request to us. Please allow 3 business days for your refill to be completed.          Additional Information About Your Visit        [employer] Information     [employer] lets you send messages to your doctor, view your test results, renew your prescriptions, schedule appointments and more. To sign up, go to [email or web address] . Click on \"Log in\" on the left side of the screen, which will take you to the Welcome page. Then click on \"Sign up Now\" on the right side of the page.     You will be asked to enter the access code listed below, as well as some personal information. Please follow the directions to create your username and password.     Your access code is: [email or web address]  Expires: 2017 11:14 AM     Your access code will  in 90 days. If you need help or a new code, please call your [place] clinic or [phone].        Care EveryWhere ID     This is your Care EveryWhere ID. This could be used by other organizations to access your [place] medical records  [id number]        Your Vitals Were     Pulse Temperature Height Pulse Oximetry Breastfeeding? BMI (Body Mass Index)    79 98.2  F (36.8  C) (Oral) 5' 5.5\" (1.664 m) 94% No 26.22 kg/m2       Blood Pressure from Last 3 Encounters:   17 120/80   17 130/80   17 119/73    Weight from Last 3 Encounters:   17 160 lb (72.6 kg)   17 158 lb (71.7 kg)   17 159 lb (72.1 kg)              We Performed the Following     COLORECTAL SURGERY REFERRAL          Today's Medication Changes          These changes are accurate as of: 4/3/17  4:17 PM.  If you have any questions, ask your nurse or doctor.               Start taking these medicines.        Dose/Directions    " hydrocortisone 25 MG Suppository   Commonly known as:  ANUSOL-HC   Used for:  External hemorrhoids   Started by:  Yann Sebastian MD        Dose:  25 mg   Place 1 suppository (25 mg) rectally 2 times daily   Quantity:  14 suppository   Refills:  0            Where to get your medicines      These medications were sent to [place] [id number] [facility] - [place], [place] - [address]  [address], [facility] [address]     Phone:  [phone]     hydrocortisone 25 MG Suppository                Primary Care Provider Office Phone # Fax #    Gene Scott [phone]       [facility] [address] [facility] [address]  [facility] [address]        Thank you!     Thank you for choosing [facility]  for your care. Our goal is always to provide you with excellent care. Hearing back from our patients is one way we can continue to improve our services. Please take a few minutes to complete the written survey that you may receive in the mail after your visit with us. Thank you!             Your Updated Medication List - Protect others around you: Learn how to safely use, store and throw away your medicines at www.disposemymeds.org.          This list is accurate as of: 4/3/17  4:17 PM.  Always use your most recent med list.                   Brand Name Dispense Instructions for use    calcium carb 1250 mg (500 mg [place])/vitamin D 200 units 500-200 MG-UNIT per tablet    OSCAL with D     Take 2 tablets by mouth every morning       citalopram 40 MG tablet    celeXA     Take 40 mg by mouth daily.       etanercept 50 MG/ML injection    ENBREL     Inject 50 mg Subcutaneous once a week On Saturdays       hydrocortisone 25 MG Suppository    ANUSOL-HC    14 suppository    Place 1 suppository (25 mg) rectally 2 times daily       leflunomide 10 MG tablet    ARAVA     Take 20 mg by mouth every other day       * metoprolol 25 MG tablet    LOPRESSOR     Take 25 mg by mouth every morning        * metoprolol 50 MG tablet    LOPRESSOR     Take 50 mg by mouth every evening       multivitamin, therapeutic Tabs tablet      Take 1 tablet by mouth daily       OMEGA-3 FISH OIL PO      Take 1 g by mouth daily       PREVACID 30 MG CR capsule   Generic drug:  LANsoprazole      Take 30 mg by mouth daily.       traZODone 75 MG Tabs half-tab    DESYREL     Take 75 mg by mouth At Bedtime.       VITAMIN D3 PO      Take 400 Units by mouth daily       * Notice:  This list has 2 medication(s) that are the same as other medications prescribed for you. Read the directions carefully, and ask your doctor or other care provider to review them with you.

## 2017-05-11 DIAGNOSIS — K21.9 GASTROESOPHAGEAL REFLUX DISEASE WITHOUT ESOPHAGITIS: Primary | ICD-10-CM

## 2017-05-11 DIAGNOSIS — R00.0 TACHYCARDIA: ICD-10-CM

## 2017-05-11 NOTE — TELEPHONE ENCOUNTER
Reason for Call:  Medication or medication refill:Refill    Do you use a [facility]?  Name of the pharmacy and phone number for the current request:  [facility]    Name of the medication requested: Metoprolo and Omeprazol    Other request: Pt needs refill - per pt - requested from [facility] 3 days ago.  Is getting low on medication    Can we leave a detailed message on this number? YES    Phone number patient can be reached at: Home number on file [phone] (home)    Best Time: anytime    Call taken on 5/11/2017 at 11:14 AM by NATIVIDAD MONTANEZ

## 2017-05-11 NOTE — TELEPHONE ENCOUNTER
Pt called back-Relayed below. Pt stated she takes Metoprolol 50mg tabs-25mg q am, 50mg q pm (pt cuts 50mg tabs)-pt takes for tachycardia. Omeprazole 40mg qd (takes for GERD).       [facility] has not filled meds before, but pt is almost out. Does not have enough to last til [facility] is back on 5/16. Ok til fill?

## 2017-05-11 NOTE — TELEPHONE ENCOUNTER
Metoprolol listed as historic-25mg every morning and 50mg every evening, Omeprazole is not on med list. Dr. Scott has not ordered these for pt before.     Need dosing information for Omeprazole and to determine if she uses Metoprolol 25 or 50 mg tabs. Left voice message for pt to call back to confirm doses.

## 2017-05-12 RX ORDER — OMEPRAZOLE 40 MG/1
40 CAPSULE, DELAYED RELEASE ORAL DAILY
Qty: 90 CAPSULE | Refills: 0 | Status: SHIPPED | OUTPATIENT
Start: 2017-05-12 | End: 2017-07-18

## 2017-05-12 RX ORDER — METOPROLOL TARTRATE 50 MG
TABLET ORAL
Qty: 135 TABLET | Refills: 0 | Status: SHIPPED | OUTPATIENT
Start: 2017-05-12 | End: 2017-07-18

## 2017-05-25 ENCOUNTER — TRANSFERRED RECORDS (OUTPATIENT)
Dept: HEALTH INFORMATION MANAGEMENT | Facility: CLINIC | Age: 72
End: 2017-05-25

## 2017-05-25 LAB
ALT SERPL-CCNC: 22 IU/L (ref 5–35)
AST SERPL-CCNC: 25 U/L (ref 5–34)
CREAT SERPL-MCNC: 0.72 MG/DL (ref 0.5–1.3)
GFR SERPL CREATININE-BSD FRML MDRD: 84.6 ML/MIN/1.73M2

## 2017-06-20 ENCOUNTER — HOSPITAL ENCOUNTER (EMERGENCY)
Facility: CLINIC | Age: 72
Discharge: HOME | End: 2017-06-20
Attending: EMERGENCY MEDICINE | Admitting: EMERGENCY MEDICINE
Payer: MEDICARE

## 2017-06-20 ENCOUNTER — APPOINTMENT (OUTPATIENT)
Dept: MRI IMAGING | Facility: CLINIC | Age: 72
End: 2017-06-20
Attending: EMERGENCY MEDICINE
Payer: MEDICARE

## 2017-06-20 ENCOUNTER — APPOINTMENT (OUTPATIENT)
Dept: CT IMAGING | Facility: CLINIC | Age: 72
End: 2017-06-20
Attending: EMERGENCY MEDICINE
Payer: MEDICARE

## 2017-06-20 VITALS
HEART RATE: 66 BPM | SYSTOLIC BLOOD PRESSURE: 141 MMHG | RESPIRATION RATE: 18 BRPM | WEIGHT: 158 LBS | TEMPERATURE: 98 F | OXYGEN SATURATION: 91 % | BODY MASS INDEX: 25.89 KG/M2 | DIASTOLIC BLOOD PRESSURE: 90 MMHG

## 2017-06-20 DIAGNOSIS — M51.26 LUMBAR HERNIATED DISC: ICD-10-CM

## 2017-06-20 DIAGNOSIS — M54.50 ACUTE RIGHT-SIDED LOW BACK PAIN WITHOUT SCIATICA: ICD-10-CM

## 2017-06-20 DIAGNOSIS — S39.012A STRAIN OF LUMBAR REGION, INITIAL ENCOUNTER: ICD-10-CM

## 2017-06-20 PROCEDURE — 96374 THER/PROPH/DIAG INJ IV PUSH: CPT

## 2017-06-20 PROCEDURE — 99285 EMERGENCY DEPT VISIT HI MDM: CPT | Mod: 25

## 2017-06-20 PROCEDURE — 25000128 H RX IP 250 OP 636: Performed by: EMERGENCY MEDICINE

## 2017-06-20 PROCEDURE — 25000132 ZZH RX MED GY IP 250 OP 250 PS 637: Mod: GY | Performed by: EMERGENCY MEDICINE

## 2017-06-20 PROCEDURE — A9270 NON-COVERED ITEM OR SERVICE: HCPCS | Mod: GY | Performed by: EMERGENCY MEDICINE

## 2017-06-20 PROCEDURE — 72131 CT LUMBAR SPINE W/O DYE: CPT

## 2017-06-20 PROCEDURE — 72148 MRI LUMBAR SPINE W/O DYE: CPT

## 2017-06-20 PROCEDURE — 96375 TX/PRO/DX INJ NEW DRUG ADDON: CPT

## 2017-06-20 RX ORDER — METHYLPREDNISOLONE SODIUM SUCCINATE 125 MG/2ML
125 INJECTION, POWDER, LYOPHILIZED, FOR SOLUTION INTRAMUSCULAR; INTRAVENOUS ONCE
Status: COMPLETED | OUTPATIENT
Start: 2017-06-20 | End: 2017-06-20

## 2017-06-20 RX ORDER — METHOCARBAMOL 750 MG/1
750 TABLET, FILM COATED ORAL ONCE
Status: COMPLETED | OUTPATIENT
Start: 2017-06-20 | End: 2017-06-20

## 2017-06-20 RX ORDER — METHOCARBAMOL 750 MG/1
750 TABLET, FILM COATED ORAL 3 TIMES DAILY PRN
Qty: 15 TABLET | Refills: 0 | Status: SHIPPED | OUTPATIENT
Start: 2017-06-20 | End: 2017-09-20

## 2017-06-20 RX ORDER — METHYLPREDNISOLONE 4 MG
TABLET, DOSE PACK ORAL
Qty: 21 TABLET | Refills: 0 | Status: SHIPPED | OUTPATIENT
Start: 2017-06-20 | End: 2017-07-18

## 2017-06-20 RX ORDER — HYDROCODONE BITARTRATE AND ACETAMINOPHEN 5; 325 MG/1; MG/1
1-2 TABLET ORAL EVERY 4 HOURS PRN
Qty: 15 TABLET | Refills: 0 | Status: SHIPPED | OUTPATIENT
Start: 2017-06-20 | End: 2017-07-18

## 2017-06-20 RX ORDER — MORPHINE SULFATE 4 MG/ML
4 INJECTION, SOLUTION INTRAMUSCULAR; INTRAVENOUS ONCE
Status: COMPLETED | OUTPATIENT
Start: 2017-06-20 | End: 2017-06-20

## 2017-06-20 RX ADMIN — MORPHINE SULFATE 4 MG: 4 INJECTION, SOLUTION INTRAMUSCULAR; INTRAVENOUS at 13:50

## 2017-06-20 RX ADMIN — METHYLPREDNISOLONE SODIUM SUCCINATE 125 MG: 125 INJECTION, POWDER, FOR SOLUTION INTRAMUSCULAR; INTRAVENOUS at 11:49

## 2017-06-20 RX ADMIN — METHOCARBAMOL 750 MG: 750 TABLET ORAL at 11:49

## 2017-06-20 ASSESSMENT — ENCOUNTER SYMPTOMS
NAUSEA: 0
BACK PAIN: 1
SHORTNESS OF BREATH: 0

## 2017-06-20 NOTE — ED AVS SNAPSHOT
[facility] Department    [address]    [facility] [address]    Phone:  [phone]    Fax:  [phone]                                       Emma Jenkins   MRN: [id number]    Department:  [facility] Department   Date of Visit:  6/20/2017           After Visit Summary Signature Page     I have received my discharge instructions, and my questions have been answered. I have discussed any challenges I see with this plan with the nurse or doctor.    ..........................................................................................................................................  Patient/Patient Representative Signature      ..........................................................................................................................................  Patient Representative Print Name and Relationship to Patient    ..................................................               ................................................  Date                                            Time    ..........................................................................................................................................  Reviewed by Signature/Title    ...................................................              ..............................................  Date                                                            Time

## 2017-06-20 NOTE — ED NOTES
Pt presents to ED with EMS reporting she was in the bathroom and developed severe lower right back pain, reports hx of back pain states she thinks it was flared by driving to [place]. Pt given 75mcg of Fentanyl and 1mg of Versed with no relief in pain. ABCs intact. A/OX3

## 2017-06-20 NOTE — ED AVS SNAPSHOT
[facility] Emergency Department    [address]    [facility] [address]    Phone:  [phone]    Fax:  [phone]                                       Emma Jenkins   MRN: [id number]    Department:  [facility] Emergency Department   Date of Visit:  6/20/2017           Patient Information     Date Of Birth          1945        Your diagnoses for this visit were:     Lumbar herniated disc     Strain of lumbar region, initial encounter     Acute right-sided low back pain without sciatica        You were seen by Jacky Massey DO.      Follow-up Information     Follow up with Gene Scott MD. Call in 2 days.    Specialty:  Internal Medicine    Why:  As needed    Contact information:    [facility]  [address]  [facility] [address]  [phone]          Follow up with Antonio May MD. Call on 6/20/2017.    Specialty:  Orthopedics    Why:  To schedule a follow up appointment    Contact information:    [facility] ORTHO SURGERY   [address]  [address]   [facility] [address]  [phone]          Follow up with [facility] Emergency Department.    Specialty:  EMERGENCY MEDICINE    Why:  If symptoms worsen    Contact information:    [address] [place] [address] [phone]        Follow up with [facility] Emergency Department.    Specialty:  EMERGENCY MEDICINE    Why:  If symptoms worsen    Contact information:    [address] [employer] [id number] [phone]        Discharge Instructions           Exercises to Strengthen Your Lower Back  Strong lower back and abdominal muscles work together to support your spine. The exercises below will help strengthen the lower back. It is important that you begin exercising slowly and increase levels gradually.  Always begin any exercise program with stretching. If you feel pain while doing any of these exercises,  stop and talk to your doctor about a more specific exercise program that better suits your condition.   Low back stretch  The point of stretching is to make you more flexible and increase your range of motion. Stretch only as much as you are able. Stretch slowly. Do not push your stretch to the limit. If at any point you feel pain while stretching, this is your (temporary) limit.    Lie on your back with your knees bent and both feet on the ground.    Slowly raise your left knee to your chest as you flatten your lower back against the floor. Hold for 5 seconds.    Relax and repeat the exercise with your right knee.    Do 10 of these exercises for each leg.    Repeat hugging both knees to your chest at the same time.  Building lower back strength  Start your exercise routine with 10 to 30 minutes a day, 1 to 3 times a day.  Initial exercises  Lying on your back:  1. Ankle pumps: Move your foot up and down, towards your head, and then away. Repeat 10 times with each foot.  2. Heel slides: Slowly bend your knee, drawing the heel of your foot towards you. Then slide your heel/foot from you, straightening your knee. Do not lift your foot off the floor (this is not a leg lift).  3. Abdominal contraction: Bend your knees and put your hands on your stomach. Tighten your stomach muscles. Hold for 5 seconds, then relax. Repeat 10 times.  4. Straight leg raise: Bend one leg at the knee and keep the other leg straight. Tighten your stomach muscles. Slowly lift your straight leg 6 to 12 inches off the floor and hold for up to 5 seconds. Repeat 10 times on each side.  Standin. Wall squats: Stand with your back against the wall. Move your feet about 12 inches away from the wall. Tighten your stomach muscles, and slowly bend your knees until they are at about a 45 degree angle. Do not go down too far. Hold about 5 seconds. Then slowly return to your starting position. Repeat 10 times.  2. Heel raises: Stand facing the wall.  Slowly raise the heels of your feet up and down, while keeping your toes on the floor. If you have trouble balancing, you can touch the wall with your hands. Repeat 10 times.  More advanced exercises  When you feel comfortable enough, try these exercises.  1. Kneeling lumbar extension: Begin on your hands and knees. At the same time, raise and straighten your right arm and left leg until they are parallel to the ground. Hold for 2 seconds and come back slowly to a starting position. Repeat with left arm and right leg, alternating 10 times.  2. Prone lumbar extension: Lie face down, arms extended overhead, palms on the floor. At the same time, raise your right arm and left leg as high as comfortably possible. Hold for 10 seconds and slowly return to start. Repeat with left arm and right leg, alternating 10 times. Gradually build up to 20 times. (Advanced: Repeat this exercise raising both arms and both legs a few inches off the floor at the same time. Hold for 5 seconds and release.)  3. Pelvic tilt: Lie on the floor on your back with your knees bent at 90 degrees. Your feet should be flat on the floor. Inhale, exhale, then slowly contract your abdominal muscles bringing your navel toward your spine. Let your pelvis rock back until your lower back is flat on the floor. Hold for 10 seconds while breathing smoothly.  4. Abdominal crunch: Perform a pelvic tilt (above) flattening your lower back against the floor. Holding the tension in your abdominal muscles, take another breath and raise your shoulder blades off the ground (this is not a full sit-up). Keep your head in line with your body (don t bend your neck forward). Hold for 2 seconds, then slowly lower.  Date Last Reviewed: 6/1/2016 2000-2017 [employer]. [address] [place] [address], [place] [address]. All rights reserved. This information is not intended as a substitute for professional medical care. Always follow your healthcare professional's  instructions.      Home  Back  SP  RU  VI  CH     *BACK PAIN [acute or chronic]    Back pain is usually caused by an injury to the muscles or ligaments of the spine. Sometimes the disks that separate each bone in the spine may bulge and cause pain by pressing on a nearby nerve. Back pain may also appear after a sudden twisting/bending force (such as in a car accident), after a simple awkward movement, or lifting something heavy with poor body positioning. In either case, muscle spasm is often present and adds to the pain.  Acute back pain usually gets better in one to two weeks. Back pain related to disk disease, arthritis in the spinal joints or spinal stenosis (narrowing of the spinal canal) can become chronic and last for months or years.  Unless you had a physical injury (for example, a car accident or fall) X-rays are usually not ordered for the initial evaluation of back pain. If pain continues and does not respond to medical treatment, x-rays and other tests may be performed at a later time.  HOME CARE:  1. You should rest as needed. But, as soon as possible, begin sitting or walking to avoid problems with prolonged bed rest (muscle weakness, worsening back stiffness and pain, blood clots in the legs).  2. When in bed, try to find a position of comfort. A firm mattress is best. Try lying flat on your back with pillows under your knees. You can also try lying on your side with your knees bent up towards your chest and a pillow between your knees.  3. Avoid prolonged sitting. This puts more stress on the lower back than standing or walking.  4. During the first two days after injury, apply an ICE PACK to the painful area for 20 minutes every 2-4 hours. This will reduce swelling and pain. HEAT (hot shower, hot bath or heating pad) works well for muscle spasm. You can start with ice, then switch to heat after two days. Some patients feel best alternating ice and heat treatments. Use the one method that feels  the best to you.  5. You may use acetaminophen (Tylenol) 650-1000 mg every 6 hours or ibuprofen (Motrin, Advil) 600 mg every 6-8 hours with food to control pain, if you are able to take these medicines. [NOTE: If you have chronic liver or kidney disease or ever had a stomach ulcer or GI bleeding, talk with your doctor before using these medicines.]  6. Be aware of safe lifting methods and do not lift anything over 15 pounds until all the pain is gone.   FOLLOW UP with your doctor if your symptoms do not start to improve after one week. Your doctor may consider using physical therapy to help your recover.   GET PROMPT MEDICAL ATTENTION if any of the following occur:     Pain becomes worse or spreads to your legs    Weakness or numbness in one or both legs    Loss of bowel or bladder control    Numbness in the groin or genital area     [address] [place] [address]. All rights reserved. This information is not intended as a substitute for professional medical care. Always follow your healthcare professional's instructions.    Future Appointments        Provider Department Dept Phone Center    6/21/2017 11:40 AM Ike Segovia MD [facility] [phone] [facility]    7/18/2017 9:00 AM Gene Scott MD, MD [facility] [phone] [place]      24 Hour Appointment Hotline       To make an appointment at any [facility], call [phone] ([phone]). If you don't have a family doctor or clinic, we will help you find one. [facility] (formerly [facility]3] are conveniently located to serve the needs of you and your family.             Review of your medicines      START taking        Dose / Directions Last dose taken    HYDROcodone-acetaminophen 5-325 MG per tablet   Commonly known as:  NORCO   Dose:  1-2 tablet   Quantity:  15 tablet        Take 1-2 tablets by mouth every 4 hours as needed for moderate to severe pain   Refills:  0        methocarbamol 750  MG tablet   Commonly known as:  ROBAXIN   Dose:  750 mg   Quantity:  15 tablet        Take 1 tablet (750 mg) by mouth 3 times daily as needed for muscle spasms   Refills:  0        methylPREDNISolone 4 MG tablet   Commonly known as:  MEDROL DOSEPAK   Quantity:  21 tablet        Follow package instructions   Refills:  0          Our records show that you are taking the medicines listed below. If these are incorrect, please call your family doctor or clinic.        Dose / Directions Last dose taken    calcium carb 1250 mg (500 mg [place])/vitamin D 200 units 500-200 MG-UNIT per tablet   Commonly known as:  OSCAL with D   Dose:  2 tablet        Take 2 tablets by mouth every morning   Refills:  0        citalopram 40 MG tablet   Commonly known as:  celeXA   Dose:  40 mg        Take 40 mg by mouth daily.   Refills:  0        etanercept 50 MG/ML injection   Commonly known as:  ENBREL   Dose:  50 mg        Inject 50 mg Subcutaneous once a week On Saturdays   Refills:  0        leflunomide 10 MG tablet   Commonly known as:  ARAVA   Dose:  20 mg        Take 20 mg by mouth every other day   Refills:  0        * metoprolol 25 MG tablet   Commonly known as:  LOPRESSOR   Dose:  25 mg        Take 25 mg by mouth every morning   Refills:  0        * metoprolol 50 MG tablet   Commonly known as:  LOPRESSOR   Quantity:  135 tablet        25mg q am, 50mg q hs   Refills:  0        multivitamin, therapeutic Tabs tablet   Dose:  1 tablet        Take 1 tablet by mouth daily   Refills:  0        OMEGA-3 FISH OIL PO   Dose:  1 g        Take 1 g by mouth daily   Refills:  0        omeprazole 40 MG capsule   Commonly known as:  priLOSEC   Dose:  40 mg   Quantity:  90 capsule        Take 1 capsule (40 mg) by mouth daily Take 30-60 minutes before a meal.   Refills:  0        PREVACID 30 MG CR capsule   Dose:  30 mg   Generic drug:  LANsoprazole        Take 30 mg by mouth daily.   Refills:  0        traZODone 75 mg Tabs half-tab   Commonly known as:   DESYREL   Dose:  75 mg        Take 75 mg by mouth At Bedtime.   Refills:  0        VITAMIN D3 PO   Dose:  400 Units        Take 400 Units by mouth daily   Refills:  0        * Notice:  This list has 2 medication(s) that are the same as other medications prescribed for you. Read the directions carefully, and ask your doctor or other care provider to review them with you.            Prescriptions were sent or printed at these locations (3 Prescriptions)                   Other Prescriptions                Printed at Department/Unit printer (3 of 3)         methocarbamol (ROBAXIN) 750 MG tablet               HYDROcodone-acetaminophen (NORCO) 5-325 MG per tablet               methylPREDNISolone (MEDROL DOSEPAK) 4 MG tablet                Procedures and tests performed during your visit     Lumbar spine CT w/o contrast    Lumbar spine MRI w/o contrast      Orders Needing Specimen Collection     None      Pending Results     No orders found from 6/18/2017 to 6/21/2017.            Pending Culture Results     No orders found from 6/18/2017 to 6/21/2017.            Pending Results Instructions     If you had any lab results that were not finalized at the time of your Discharge, you can call the ED Lab Result RN at [phone]. You will be contacted by this team for any positive Lab results or changes in treatment. The nurses are available 7 days a week from 10A to 6:30P.  You can leave a message 24 hours per day and they will return your call.        Test Results From Your Hospital Stay        6/20/2017  3:08 PM      Narrative     CT LUMBAR SPINE WITHOUT CONTRAST  6/20/2017 12:27 PM     HISTORY: Right lumbar pain.    TECHNIQUE:  Radiation dose for this scan was reduced using automated  exposure control, adjustment of the mA and/or kV according to patient  size, or iterative reconstruction technique.    FINDINGS: There are five nonrib-bearing or lumbar-type vertebrae.  Apophyseal joint degenerative arthrosis is noted,  greatest at L4-L5  and L3-L4. This is associated with 0.4 cm degenerative grade 1  spondylolisthesis at L3-L4. Minimal degenerative spondylolisthesis is  also noted at L4-L5. Degenerative intradiscal gas is noted at L3-L4.  Mild loss of disc height is present from L2-L3 through L4-L5 as well.    At L2-L3, there is diffuse annular bulge with borderline narrowing of  the central canal and mild foraminal narrowing below the exiting nerve  roots, left greater than right.    At L3-L4, there is diffuse annular bulge, ligamentum flavum  thickening, and degenerative spondylolisthesis resulting in moderate  to severe central stenosis. Mild to moderate bilateral foraminal  stenosis is also noted.    At L4-L5, there is diffuse annular bulge and ligamentum flavum  thickening with mild central stenosis. Moderate to severe left and  mild right foraminal stenosis.    At L5-S1, asymmetrical soft tissue density is noted within the right  lateral recess. This could represent artifact. Right lateral recess  disc herniation cannot be excluded. No central stenosis. The L5 neural  foramina are bilaterally patent.        Impression     IMPRESSION:  1. Apophyseal joint degenerative arthrosis at L3-L4 and L4-L5 with  degenerative spondylolisthesis at L3-L4 and to a lesser degree at  L4-L5.  2. Multilevel central stenosis, greatest at L3-L4.  3. Multilevel foraminal stenosis, greatest on the left at L4-L5.  4. L5-S1 possible right lateral recess disc herniation. There is  asymmetrical soft tissue density in the right lateral recess. Although  this could represent artifact, right lateral recess disc herniation is  not excluded, particularly if the patient has symptoms of right S1  radiculopathy.    AJITH SLATER MD         6/20/2017  3:24 PM      Narrative     MR LUMBAR SPINE WITHOUT CONTRAST June 20, 2017 2:20 PM    HISTORY: Lumbar herniation/stenosis.    TECHNIQUE: Sagittal T1 and T2, sagittal IR, and transverse proton  density and  T2-weighted pulse sequences.    FINDINGS: Five lumbar vertebrae are assumed. Relatively advanced  apophyseal joint degenerative arthrosis is noted at L3-L4 and L4-L5  without periarticular marrow edema. However, grade 1 degenerative  spondylolisthesis is noted at L3-L4 and to a lesser degree at L4-L5.  Moderate degenerative disc disease is noted from L2-L3 through L5-S1.  Vertebral body heights are within normal limits. The conus medullaris  is unremarkable in appearance on the sagittal images.     L2-L3: Diffuse annular bulge without central stenosis. There is only  mild foraminal narrowing below the exiting nerve roots.    L3-L4: Broad-based central disc extrusion. This in combination with  degenerative spondylolisthesis and ligamentum flavum thickening  results in borderline or mild central stenosis. Mild bilateral  foraminal stenosis.    L4-L5: Diffuse annular bulge with moderate left and mild right  foraminal stenosis. No central stenosis.    L5-S1: Moderate-sized 0.7 cm disc extrusion or herniation within the  right lateral recess which likely compresses the right S1 nerve root.  No central stenosis. The L5 neural foramina are bilaterally patent.        Impression     IMPRESSION:  1. Moderate degenerative disc disease from L2-L3 through L5-S1.  2. Apophyseal joint degenerative arthrosis and degenerative grade 1  spondylolisthesis at L3-L4 and L4-L5.  3. L5-S1 right lateral recess 0.7 cm disc extrusion or herniation  which appears to compress the right S1 nerve root.  4. Mild or borderline central stenosis at L3-L4.  5. Multilevel foraminal stenosis, greatest and moderate on the left at  L4-L5.    AJITH SLATER MD                Clinical Quality Measure: Blood Pressure Screening     Your blood pressure was checked while you were in the emergency department today. The last reading we obtained was  BP: 142/86 . Please read the guidelines below about what these numbers mean and what you should do about them.  If  "your systolic blood pressure (the top number) is less than 120 and your diastolic blood pressure (the bottom number) is less than 80, then your blood pressure is normal. There is nothing more that you need to do about it.  If your systolic blood pressure (the top number) is 120-139 or your diastolic blood pressure (the bottom number) is 80-89, your blood pressure may be higher than it should be. You should have your blood pressure rechecked within a year by a primary care provider.  If your systolic blood pressure (the top number) is 140 or greater or your diastolic blood pressure (the bottom number) is 90 or greater, you may have high blood pressure. High blood pressure is treatable, but if left untreated over time it can put you at risk for heart attack, stroke, or kidney failure. You should have your blood pressure rechecked by a primary care provider within the next 4 weeks.  If your provider in the emergency department today gave you specific instructions to follow-up with your doctor or provider even sooner than that, you should follow that instruction and not wait for up to 4 weeks for your follow-up visit.        Thank you for choosing [facility]       Thank you for choosing [facility] for your care. Our goal is always to provide you with excellent care. Hearing back from our patients is one way we can continue to improve our services. Please take a few minutes to complete the written survey that you may receive in the mail after you visit with us. Thank you!        [employer] Information     [employer] lets you send messages to your doctor, view your test results, renew your prescriptions, schedule appointments and more. To sign up, go to [email or web address] . Click on \"Log in\" on the left side of the screen, which will take you to the Welcome page. Then click on \"Sign up Now\" on the right side of the page.     You will be asked to enter the access code listed below, as well as some personal information. Please " follow the directions to create your username and password.     Your access code is: [email or web address]  Expires: 2017  1:37 PM     Your access code will  in 90 days. If you need help or a new code, please call your [facility] clinic or [phone].        Care EveryWhere ID     This is your Care EveryWhere ID. This could be used by other organizations to access your [facility] medical records  [id number]        After Visit Summary       This is your record. Keep this with you and show to your community pharmacist(s) and doctor(s) at your next visit.

## 2017-06-20 NOTE — DISCHARGE INSTRUCTIONS
Exercises to Strengthen Your Lower Back  Strong lower back and abdominal muscles work together to support your spine. The exercises below will help strengthen the lower back. It is important that you begin exercising slowly and increase levels gradually.  Always begin any exercise program with stretching. If you feel pain while doing any of these exercises, stop and talk to your doctor about a more specific exercise program that better suits your condition.   Low back stretch  The point of stretching is to make you more flexible and increase your range of motion. Stretch only as much as you are able. Stretch slowly. Do not push your stretch to the limit. If at any point you feel pain while stretching, this is your (temporary) limit.    Lie on your back with your knees bent and both feet on the ground.    Slowly raise your left knee to your chest as you flatten your lower back against the floor. Hold for 5 seconds.    Relax and repeat the exercise with your right knee.    Do 10 of these exercises for each leg.    Repeat hugging both knees to your chest at the same time.  Building lower back strength  Start your exercise routine with 10 to 30 minutes a day, 1 to 3 times a day.  Initial exercises  Lying on your back:  1. Ankle pumps: Move your foot up and down, towards your head, and then away. Repeat 10 times with each foot.  2. Heel slides: Slowly bend your knee, drawing the heel of your foot towards you. Then slide your heel/foot from you, straightening your knee. Do not lift your foot off the floor (this is not a leg lift).  3. Abdominal contraction: Bend your knees and put your hands on your stomach. Tighten your stomach muscles. Hold for 5 seconds, then relax. Repeat 10 times.  4. Straight leg raise: Bend one leg at the knee and keep the other leg straight. Tighten your stomach muscles. Slowly lift your straight leg 6 to 12 inches off the floor and hold for up to 5 seconds. Repeat 10 times on each  side.  Standin. Wall squats: Stand with your back against the wall. Move your feet about 12 inches away from the wall. Tighten your stomach muscles, and slowly bend your knees until they are at about a 45 degree angle. Do not go down too far. Hold about 5 seconds. Then slowly return to your starting position. Repeat 10 times.  2. Heel raises: Stand facing the wall. Slowly raise the heels of your feet up and down, while keeping your toes on the floor. If you have trouble balancing, you can touch the wall with your hands. Repeat 10 times.  More advanced exercises  When you feel comfortable enough, try these exercises.  1. Kneeling lumbar extension: Begin on your hands and knees. At the same time, raise and straighten your right arm and left leg until they are parallel to the ground. Hold for 2 seconds and come back slowly to a starting position. Repeat with left arm and right leg, alternating 10 times.  2. Prone lumbar extension: Lie face down, arms extended overhead, palms on the floor. At the same time, raise your right arm and left leg as high as comfortably possible. Hold for 10 seconds and slowly return to start. Repeat with left arm and right leg, alternating 10 times. Gradually build up to 20 times. (Advanced: Repeat this exercise raising both arms and both legs a few inches off the floor at the same time. Hold for 5 seconds and release.)  3. Pelvic tilt: Lie on the floor on your back with your knees bent at 90 degrees. Your feet should be flat on the floor. Inhale, exhale, then slowly contract your abdominal muscles bringing your navel toward your spine. Let your pelvis rock back until your lower back is flat on the floor. Hold for 10 seconds while breathing smoothly.  4. Abdominal crunch: Perform a pelvic tilt (above) flattening your lower back against the floor. Holding the tension in your abdominal muscles, take another breath and raise your shoulder blades off the ground (this is not a full sit-up).  Keep your head in line with your body (don t bend your neck forward). Hold for 2 seconds, then slowly lower.  Date Last Reviewed: 6/1/2016 2000-2017 [employer]. [address] [place] [address], [place] [address]. All rights reserved. This information is not intended as a substitute for professional medical care. Always follow your healthcare professional's instructions.      Home  Back  SP  RU  VI  CH     *BACK PAIN [acute or chronic]    Back pain is usually caused by an injury to the muscles or ligaments of the spine. Sometimes the disks that separate each bone in the spine may bulge and cause pain by pressing on a nearby nerve. Back pain may also appear after a sudden twisting/bending force (such as in a car accident), after a simple awkward movement, or lifting something heavy with poor body positioning. In either case, muscle spasm is often present and adds to the pain.  Acute back pain usually gets better in one to two weeks. Back pain related to disk disease, arthritis in the spinal joints or spinal stenosis (narrowing of the spinal canal) can become chronic and last for months or years.  Unless you had a physical injury (for example, a car accident or fall) X-rays are usually not ordered for the initial evaluation of back pain. If pain continues and does not respond to medical treatment, x-rays and other tests may be performed at a later time.  HOME CARE:  1. You should rest as needed. But, as soon as possible, begin sitting or walking to avoid problems with prolonged bed rest (muscle weakness, worsening back stiffness and pain, blood clots in the legs).  2. When in bed, try to find a position of comfort. A firm mattress is best. Try lying flat on your back with pillows under your knees. You can also try lying on your side with your knees bent up towards your chest and a pillow between your knees.  3. Avoid prolonged sitting. This puts more stress on the lower back than standing or  walking.  4. During the first two days after injury, apply an ICE PACK to the painful area for 20 minutes every 2-4 hours. This will reduce swelling and pain. HEAT (hot shower, hot bath or heating pad) works well for muscle spasm. You can start with ice, then switch to heat after two days. Some patients feel best alternating ice and heat treatments. Use the one method that feels the best to you.  5. You may use acetaminophen (Tylenol) 650-1000 mg every 6 hours or ibuprofen (Motrin, Advil) 600 mg every 6-8 hours with food to control pain, if you are able to take these medicines. [NOTE: If you have chronic liver or kidney disease or ever had a stomach ulcer or GI bleeding, talk with your doctor before using these medicines.]  6. Be aware of safe lifting methods and do not lift anything over 15 pounds until all the pain is gone.   FOLLOW UP with your doctor if your symptoms do not start to improve after one week. Your doctor may consider using physical therapy to help your recover.   GET PROMPT MEDICAL ATTENTION if any of the following occur:     Pain becomes worse or spreads to your legs    Weakness or numbness in one or both legs    Loss of bowel or bladder control    Numbness in the groin or genital area     4722-1483 [facility], [address] [place] [address], [place] [address]. All rights reserved. This information is not intended as a substitute for professional medical care. Always follow your healthcare professional's instructions.

## 2017-06-20 NOTE — ED PROVIDER NOTES
History     Chief Complaint:  Back Pain    The history is provided by the patient.      Emma Jenkins is a 71 year old female with a history of back pain who presents via EMS for evaluation of lower right back pain. The patient reports she was in the bathroom 2.5 hours prior to arrival when the pain developed, after putting down a bath mat. She was given 75mcg of Fentanyl and 1mg of Versed by EMS. She states this pain it is more severe than in previous experiences, but the pain has abided since the drugs were given and as long as she doesn't move. She became nauseas at onset, but this has since resolved. The patient denies shortness of breath, and states no other concerns at this time.  She thinks her back might have been flared by driving to [place] as well.    Allergies:  No known drug allergies.      Medications:    metoprolol (LOPRESSOR) 50 MG tablet  omeprazole (PRILOSEC) 40 MG capsule  metoprolol (LOPRESSOR) 25 MG tablet  leflunomide (ARAVA) 10 MG tablet  citalopram (CELEXA) 40 MG tablet  TRAZodone (DESYREL) 75 MG TABS  etanercept (ENBREL) 50 MG/ML injection  LANsoprazole (PREVACID) 30 MG capsule     Past Medical History:    Anxiety and depression   Arthritis   GERD (gastroesophageal reflux disease)  Heart arrhythmias     Past Surgical History:    Appendectomy  Blepharoplasty bilateral  GYN hysterectomy  Orthopedic surgery  Hand hardware removal    Family History:    Colon cancer    Social History:  Marital Status:    Smoking status: Former smoker  Alcohol use: Yes     Review of Systems   Respiratory: Negative for shortness of breath.    Gastrointestinal: Negative for nausea.   Musculoskeletal: Positive for back pain.   All other systems reviewed and are negative.    Physical Exam     Patient Vitals for the past 24 hrs:   BP Temp Temp src Pulse Resp SpO2 Weight   06/20/17 1145 142/86 - - - - - -   06/20/17 1130 (!) 141/91 - - - - 95 % -   06/20/17 1115 135/84 - - - - 94 % -   06/20/17 1111 - 98   F (36.7  C) Oral 66 18 94 % -   06/20/17 1108 - - - - - - 71.7 kg (158 lb)      Physical Exam  Constitutional: Patient appears well-developed and well-nourished. There is mild/moderate distress.   Head: No external signs of trauma noted.  Neck: No JVD noted  Eyes: Pupils are equal, round, and reactive to light.   Cardiovascular: Normal rate, regular rhythm and normal heart sounds.  Exam reveals no gallop and no friction rub.  No murmur heard. Normal peripheral pulses.  Pulmonary/Chest: Effort normal and breath sounds normal. No respiratory distress. Patient has no wheezes. Patient has no rales.   Abdominal: Soft. There is no tenderness.   Extremities: No edema noted  Neurological: She is alert and oriented to person, place, and time. She has normal strength. No cranial nerve deficit or sensory deficit. Gait is antalgic.  GCS eye subscore is 4. GCS verbal subscore is 5. GCS motor subscore is 6.   SLR negative B/L   Musculoskeletal: There is right lumbar paraspinal tenderness. No midline tenderness.  Skin: Skin is warm and dry. There is no diaphoresis noted.     Emergency Department Course     Imaging:  Radiology findings were communicated with the patient who voiced understanding of the findings.  Lumber spine CT w/o contrast:  IMPRESSION:  1. Apophyseal joint degenerative arthrosis at L3-L4 and L4-L5 with  degenerative spondylolisthesis at L3-L4 and to a lesser degree at  L4-L5.  2. Multilevel central stenosis, greatest at L3-L4.  3. Multilevel foraminal stenosis, greatest on the left at L4-L5.  4. L5-S1 possible right lateral recess disc herniation. There is  asymmetrical soft tissue density in the right lateral recess. Although  this could represent artifact, right lateral recess disc herniation is  not excluded, particularly if the patient has symptoms of right S1  radiculopathy.   Report per radiology     Lumbar spine MRI w/o contrast:   IMPRESSION:  1. Moderate degenerative disc disease from L2-L3 through  L5-S1.  2. Apophyseal joint degenerative arthrosis and degenerative grade 1  spondylolisthesis at L3-L4 and L4-L5.  3. L5-S1 right lateral recess 0.7 cm disc extrusion or herniation  which appears to compress the right S1 nerve root.  4. Mild or borderline central stenosis at L3-L4.  5. Multilevel foraminal stenosis, greatest and moderate on the left at  L4-L5.   Report per radiology     Interventions:  1149: Solu-medrol 125 injection  1149: Robaxin 750 mg PO  1350: Morphine 4 mg injection      Emergency Department Course:  Nursing notes and vitals reviewed.  I performed an exam of the patient as documented above.   The patient was sent for a Lumber spine CT w/o contrast and Lumbar spine MRI w/o contrast while in the emergency department, results above.  1324: I spoke with Marisa Oseguera NP with Dr. Segovia of the neurosurgery service regarding patient's presentation, findings, and plan of care. She recommends MRI, and further discussion with ortho-spine  1517: I spoke with Dr. May  of the ortho service regarding patient's presentation, findings, and plan of care.   I discussed the treatment plan with the patient. They expressed understanding of this plan and consented to discharge. They will be discharged home with instructions for care and follow up. In addition, the patient will return to the emergency department if their symptoms persist, worsen, if new symptoms arise or if there is any concern.  All questions were answered.  I personally reviewed the imaging results with the Patient and answered all related questions prior to discharge.      Impression & Plan      Medical Decision Making:  Emma Jenkins is a 71 year old female presents to the ER for evaluation of back pain. Please see the HPI for specifics. She has been able to ambulate in the ER. The patient's pain seems better controlled in ER. Radiological imaging does indicate a small disc herniation with some impingement of S1. The patient also has  right paraspinal muscular discomfort and spasm. I discussed this with both neurosurgery and orthopedics. They both state the patient can be discharged and should follow up in the orthopedic outpatient setting. We will discharge her with Robaxin and prednisone and a small prescription for Norco and encourage her to follow up closely in the outpatient setting. Anticipatory guidance given to the patient and family prior to discharge.     Diagnosis:    ICD-10-CM    1. Lumbar herniated disc M51.26    2. Strain of lumbar region, initial encounter S39.012A    3. Acute right-sided low back pain without sciatica M54.5      Disposition:   Discharged to home with the below prescription     Discharge Medications:   Medrol Dosepak  Robaxin  Norco    Scribe Disclosure:  I, Mahin Alvarez, am serving as a scribe at 11:21 AM on 6/20/2017 to document services personally performed by Jacky Massey DO, based on my observations and the provider's statements to me.    6/20/2017   [facility] EMERGENCY DEPARTMENT       Jacky Massey DO  06/20/17 1542

## 2017-06-20 NOTE — ED NOTES
Bed: ED28  Expected date: 6/20/17  Expected time: 11:01 AM  Means of arrival: Ambulance  Comments:  BV- 72 yo F back pain

## 2017-07-12 ENCOUNTER — OFFICE VISIT (OUTPATIENT)
Dept: NEUROSURGERY | Facility: CLINIC | Age: 72
End: 2017-07-12
Attending: NEUROLOGICAL SURGERY
Payer: COMMERCIAL

## 2017-07-12 VITALS
BODY MASS INDEX: 25.73 KG/M2 | HEART RATE: 66 BPM | SYSTOLIC BLOOD PRESSURE: 157 MMHG | OXYGEN SATURATION: 97 % | WEIGHT: 157 LBS | DIASTOLIC BLOOD PRESSURE: 87 MMHG

## 2017-07-12 DIAGNOSIS — M54.50 ACUTE RIGHT-SIDED LOW BACK PAIN WITHOUT SCIATICA: Primary | ICD-10-CM

## 2017-07-12 PROCEDURE — 99204 OFFICE O/P NEW MOD 45 MIN: CPT | Performed by: NEUROLOGICAL SURGERY

## 2017-07-12 NOTE — PROGRESS NOTES
Neurosurgery Spine Consult [facility]      CC: LBP    Primary care Provider: Gene Scott    Referring provider:   No referring provider defined for this encounter.      [place]: Emma Jenkins is a 71 year old female that presents to clinic with a complaint of low back pain and RLE after bending to  a mat. She subsequently was taken to the ER and evaluated. She has not tried PT or SHERRI. She denies radicular pain and feels her back and leg pain are much better.      Past Medical History:   Diagnosis Date     Anxiety and depression      Arthritis     rheumatoid arthritis     GERD (gastroesophageal reflux disease)      Heart arrhythmias        Past Surgical History:   Procedure Laterality Date     APPENDECTOMY       BLEPHAROPLASTY BILATERAL Bilateral 1/9/2017    Procedure: BLEPHAROPLASTY BILATERAL;  Surgeon: Ismael Holt MD;  [facility]     GYN SURGERY      hysterectomy     ORTHOPEDIC SURGERY       REMOVE HARDWARE HAND  10/7/2011    Procedure:REMOVE HARDWARE HAND; Right Index Finger K-Wire Removal ; Surgeon:KELSEY ALAS; Location: GI       Current Outpatient Prescriptions   Medication     methocarbamol (ROBAXIN) 750 MG tablet     HYDROcodone-acetaminophen (NORCO) 5-325 MG per tablet     methylPREDNISolone (MEDROL DOSEPAK) 4 MG tablet     metoprolol (LOPRESSOR) 50 MG tablet     omeprazole (PRILOSEC) 40 MG capsule     calcium carb 1250 mg, 500 mg [place],/vitamin D 200 units (OSCAL WITH D) 500-200 MG-UNIT per tablet     metoprolol (LOPRESSOR) 25 MG tablet     multivitamin, therapeutic (THERA-VIT) TABS     Omega-3 Fatty Acids (OMEGA-3 FISH OIL PO)     Cholecalciferol (VITAMIN D3 PO)     leflunomide (ARAVA) 10 MG tablet     citalopram (CELEXA) 40 MG tablet     TRAZodone (DESYREL) 75 MG TABS     etanercept (ENBREL) 50 MG/ML injection     LANsoprazole (PREVACID) 30 MG capsule     No current facility-administered medications for this visit.        No Known  Allergies    Social History     Social History     Marital status:      Spouse name: N/A     Number of children: N/A     Years of education: N/A     Social History Main Topics     Smoking status: Former Smoker     Years: 10.00     Smokeless tobacco: Never Used     Alcohol use 2.5 oz/week     5 Glasses of wine per week      Comment: rare     Drug use: No     Sexual activity: Yes     Partners: Male     Other Topics Concern     None     Social History Narrative       Family History   Problem Relation Age of Onset     Colon Cancer Mother 79          Colon Cancer Brother 71     alive         Review Of Systems  Skin: negative  Eyes: negative  Ears/Nose/Throat: negative  Respiratory: No shortness of breath, dyspnea on exertion, cough, or hemoptysis  Cardiovascular: negative  Gastrointestinal: negative  Genitourinary: negative  Musculoskeletal: as above and back pain  Neurologic: as above  Psychiatric: negative  Hematologic/Lymphatic/Immunologic: negative  Endocrine: negative    B/P: 157/87, T: Data Unavailable, P: 66, R: Data Unavailable    Examination:  Awake  Alert  Oriented x 3  Speech clear  Cranial nerves II - XII intact  Face symmetric  Normal ROM of back  Motor exam    RLE - iliopsoas 5/5, quads 5/5, hamstrings 5/5, dorsiflexion 5/5, plantar flexion 5/5, eversion 5/5, inversion 5/5, EHL 5/5   LLE -  iliopsoas 5/5, quads 5/5, hamstrings 5/5, dorsiflexion 5/5, plantar flexion 5/5, eversion 5/5, inversion 5/5, EHL 5/5  Sensation intact  Clonus negative  DTR 1+  Negative Lase'maine's sign    Ambulation stable    Imaging:   MRI lumbar - Large right L5-S1 herniated disk with compression of the right S1 root and L3-4 spondylolisthesis      Assessment/Plan:   1. Pt has no pain. I do not recommend surgery  2. She will try PT  3. Discussed the risk of no surgery with increased activity and risk of surgery. She will call id she wants to proceed with surgery        Ike Segovia MD, MS,  FAANS  Neurosurgeon  [facility]  [facility]  [address], [address]  [place] [address] [phone]

## 2017-07-12 NOTE — NURSING NOTE
"Emma Jenkins is a 71 year old female who presents for:  Chief Complaint   Patient presents with     Neurologic Problem     follow up from ED [place] visit 6/20/17         Initial Vitals:  /87 (BP Location: Right arm, Patient Position: Chair, Cuff Size: Adult Large)  Pulse 66  Wt 157 lb (71.2 kg)  SpO2 97%  BMI 25.73 kg/m2 Estimated body mass index is 25.73 kg/(m^2) as calculated from the following:    Height as of 4/3/17: 5' 5.5\" (1.664 m).    Weight as of this encounter: 157 lb (71.2 kg).. Body surface area is 1.81 meters squared. BP completed using cuff size: large  Data Unavailable    Do you feel safe in your environment?  Yes  Do you need any refills today? No    Nursing Comments:follow up from ED [place] visit 6/20/17  .  Patient rates 0 pain today as 7/12/17      5 min. nursing intake time  Casie Delcid CMA      Discharge plan: See MD dictation  2 min. nursing discharge time  Casie Delcid CMA         "

## 2017-07-12 NOTE — MR AVS SNAPSHOT
After Visit Summary   7/12/2017    Emma Jenkins    MRN: [id number]           Patient Information     Date Of Birth          1945        Visit Information        Provider Department      7/12/2017 11:40 AM Ike Segovia MD [facility]        Today's Diagnoses     Low back pain    -  1      Care Instructions    -Physical therapy with [facility]. They will contact you to schedule.           Follow-ups after your visit        Additional Services     [facility] PT, HAND, AND CHIROPRACTIC REFERRAL       **This order will print in the [facility] Scheduling Office**    Physical Therapy, Hand Therapy and Chiropractic Care are available through:    *[facility]  *[facility]  *[facility]    Call one number to schedule at any of the above locations: [phone].    Your provider has referred you to: Physical Therapy at [facility] or [facility]    Indication/Reason for Referral: Low Back Pain  Onset of Illness:   Therapy Orders: Evaluate and Treat  Special Programs: None  Special Request: None    Kingsley Wynn      Additional Comments for the Therapist or Chiropractor:     Please be aware that coverage of these services is subject to the terms and limitations of your health insurance plan.  Call member services at your health plan with any benefit or coverage questions.      Please bring the following to your appointment:    *Your personal calendar for scheduling future appointments  *Comfortable clothing                  Your next 10 appointments already scheduled     Jul 18, 2017  9:00 AM CDT   PHYSICAL with Gene Scott MD   [facility]    [address]  [facility] [id number] [phone]              Who to contact     If you have questions or need follow up information about today's clinic visit or your schedule please contact [facility] directly at  "[phone].  Normal or non-critical lab and imaging results will be communicated to you by MyChart, letter or phone within 4 business days after the clinic has received the results. If you do not hear from us within 7 days, please contact the clinic through Take5hart or phone. If you have a critical or abnormal lab result, we will notify you by phone as soon as possible.  Submit refill requests through SafeBoot or call your pharmacy and they will forward the refill request to us. Please allow 3 business days for your refill to be completed.          Additional Information About Your Visit        Take5hart Information     SafeBoot lets you send messages to your doctor, view your test results, renew your prescriptions, schedule appointments and more. To sign up, go to [email or web address] . Click on \"Log in\" on the left side of the screen, which will take you to the Welcome page. Then click on \"Sign up Now\" on the right side of the page.     You will be asked to enter the access code listed below, as well as some personal information. Please follow the directions to create your username and password.     Your access code is: [email or web address]  Expires: 2017  1:37 PM     Your access code will  in 90 days. If you need help or a new code, please call your [place] clinic or [phone].        Care EveryWhere ID     This is your Care EveryWhere ID. This could be used by other organizations to access your [place] medical records  [id number]        Your Vitals Were     Pulse Pulse Oximetry BMI (Body Mass Index)             66 97% 25.73 kg/m2          Blood Pressure from Last 3 Encounters:   17 157/87   17 141/90   17 120/80    Weight from Last 3 Encounters:   17 157 lb (71.2 kg)   17 158 lb (71.7 kg)   17 160 lb (72.6 kg)              We Performed the Following     [facility] PT, HAND, AND CHIROPRACTIC REFERRAL        Primary Care Provider Office Phone # Fax #    Gene Scott MD " [phone]       [facility] [address]  [facility] [address]        Equal Access to Services     LORI CABAN : Hadii aad ku hadedwinryan Easley, dipikada randasukhdeepha, evelynta kaalexusda kem, raúl joniin hayaaes santanaraimundo menendez juanita torres. So [facility] [place] [phone].    ATENCIÓN: Si habla español, tiene a monzon disposición servicios gratuitos de asistencia lingüística. Llame al [phone].    We comply with applicable federal civil rights laws and [place] laws. We do not discriminate on the basis of race, color, national origin, age, disability sex, sexual orientation or gender identity.            Thank you!     Thank you for choosing [facility]  for your care. Our goal is always to provide you with excellent care. Hearing back from our patients is one way we can continue to improve our services. Please take a few minutes to complete the written survey that you may receive in the mail after your visit with us. Thank you!             Your Updated Medication List - Protect others around you: Learn how to safely use, store and throw away your medicines at www.disposemymeds.org.          This list is accurate as of: 7/12/17 12:10 PM.  Always use your most recent med list.                   Brand Name Dispense Instructions for use Diagnosis    calcium carb 1250 mg (500 mg Iqugmiut)/vitamin D 200 units 500-200 MG-UNIT per tablet    OSCAL with D     Take 2 tablets by mouth every morning        citalopram 40 MG tablet    celeXA     Take 40 mg by mouth daily.        etanercept 50 MG/ML injection    ENBREL     Inject 50 mg Subcutaneous once a week On Saturdays        HYDROcodone-acetaminophen 5-325 MG per tablet    NORCO    15 tablet    Take 1-2 tablets by mouth every 4 hours as needed for moderate to severe pain        leflunomide 10 MG tablet    ARAVA     Take 20 mg by mouth every other day        methocarbamol 750 MG tablet    ROBAXIN    15 tablet    Take 1 tablet (750 mg) by mouth  3 times daily as needed for muscle spasms        methylPREDNISolone 4 MG tablet    MEDROL DOSEPAK    21 tablet    Follow package instructions        * metoprolol 25 MG tablet    LOPRESSOR     Take 25 mg by mouth every morning        * metoprolol 50 MG tablet    LOPRESSOR    135 tablet    25mg q am, 50mg q hs    Tachycardia       multivitamin, therapeutic Tabs tablet      Take 1 tablet by mouth daily        OMEGA-3 FISH OIL PO      Take 1 g by mouth daily        omeprazole 40 MG capsule    priLOSEC    90 capsule    Take 1 capsule (40 mg) by mouth daily Take 30-60 minutes before a meal.    Gastroesophageal reflux disease without esophagitis       PREVACID 30 MG CR capsule   Generic drug:  LANsoprazole      Take 30 mg by mouth daily.        traZODone 75 mg Tabs half-tab    DESYREL     Take 75 mg by mouth At Bedtime.        VITAMIN D3 PO      Take 400 Units by mouth daily        * Notice:  This list has 2 medication(s) that are the same as other medications prescribed for you. Read the directions carefully, and ask your doctor or other care provider to review them with you.

## 2017-07-14 ENCOUNTER — THERAPY VISIT (OUTPATIENT)
Dept: PHYSICAL THERAPY | Facility: CLINIC | Age: 72
End: 2017-07-14
Payer: MEDICARE

## 2017-07-14 DIAGNOSIS — M54.50 LOW BACK PAIN: Primary | ICD-10-CM

## 2017-07-14 DIAGNOSIS — M99.33 OSSEOUS STENOSIS OF NEURAL CANAL OF LUMBAR REGION: ICD-10-CM

## 2017-07-14 PROCEDURE — 97530 THERAPEUTIC ACTIVITIES: CPT | Mod: GP | Performed by: PHYSICAL THERAPIST

## 2017-07-14 PROCEDURE — 97110 THERAPEUTIC EXERCISES: CPT | Mod: GP | Performed by: PHYSICAL THERAPIST

## 2017-07-14 PROCEDURE — G8978 MOBILITY CURRENT STATUS: HCPCS | Mod: GP | Performed by: PHYSICAL THERAPIST

## 2017-07-14 PROCEDURE — 97161 PT EVAL LOW COMPLEX 20 MIN: CPT | Mod: GP | Performed by: PHYSICAL THERAPIST

## 2017-07-14 PROCEDURE — G8979 MOBILITY GOAL STATUS: HCPCS | Mod: GP | Performed by: PHYSICAL THERAPIST

## 2017-07-14 NOTE — MR AVS SNAPSHOT
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 [address]   [phone]            Jul 20, 2017  8:40 AM CDT   [facility] Sp[facility] with Jon Schoenecker, PT [facility] ([facility]  )    [address] [facility]  [address] [place] [address]   [phone]            Jul 27, 2017  1:10 PM CDT   [facility] with Jon Schoenecker, PT [facility] ([facility]  )    [address] [facility]  [address] [place] [address]   [phone]            Aug 03, 2017 11:50 AM CDT   [facility] with [facility] ([facility]  )    [address] [facility]  [address] [place] [address]   [phone]              Who to contact     If you have questions or need follow up information about today's clinic visit or your schedule please contact [facility] directly at [phone].  Normal or non-critical lab and imaging results will be communicated to you by MyChart, letter or phone within 4 business days after the clinic has received the results. If you do not hear from us within 7 days, please contact the clinic through [email or web address] or phone. If you have a critical or abnormal lab result, we will notify you by phone as soon as possible.  Submit refill requests through [email or web address] or call your pharmacy and they will forward the refill request to us. Please allow 3 business days for your refill to be completed.          Additional  "Information About Your Visit        MyChart Information     CrossCurrent lets you send messages to your doctor, view your test results, renew your prescriptions, schedule appointments and more. To sign up, go to [email or web address] . Click on \"Log in\" on the left side of the screen, which will take you to the Welcome page. Then click on \"Sign up Now\" on the right side of the page.     You will be asked to enter the access code listed below, as well as some personal information. Please follow the directions to create your username and password.     Your access code is: [email or web address]  Expires: 2017  1:37 PM     Your access code will  in 90 days. If you need help or a new code, please call your [place] clinic or [phone].        Care EveryWhere ID     This is your Care EveryWhere ID. This could be used by other organizations to access your [place] medical records  [id number]         Blood Pressure from Last 3 Encounters:   17 157/87   17 141/90   17 120/80    Weight from Last 3 Encounters:   17 71.2 kg (157 lb)   17 71.7 kg (158 lb)   17 72.6 kg (160 lb)              We Performed the Following     [facility] PT EVAL, LOW COMPLEXITY     [facility] CERT REPORT     [facility] INITIAL EVAL REPORT     THERAPEUTIC ACTIVITIES     THERAPEUTIC EXERCISES        Primary Care Provider Office Phone # Fax #    Gene Scott [phone]       [facility] [address]  [facility] [address]        Equal Access to Services     [facility] : Hadii aad ku hadasho Soomaali, waaxda luqadaha, qaybta kaalmada adeegyazeus, raúl grant . So [facility] [phone].    ATENCIÓN: Si habla español, tiene a monzon disposición servicios gratuitos de asistencia lingüística. Llame al [phone].    We comply with applicable federal civil rights laws and [place] laws. We do not discriminate on the basis of race, color, national origin, age, disability sex, " sexual orientation or gender identity.            Thank you!     Thank you for choosing [facility]  for your care. Our goal is always to provide you with excellent care. Hearing back from our patients is one way we can continue to improve our services. Please take a few minutes to complete the written survey that you may receive in the mail after your visit with us. Thank you!             Your Updated Medication List - Protect others around you: Learn how to safely use, store and throw away your medicines at www.disposemymeds.org.          This list is accurate as of: 7/14/17  4:00 PM.  Always use your most recent med list.                   Brand Name Dispense Instructions for use Diagnosis    calcium carb 1250 mg (500 mg [place])/vitamin D 200 units 500-200 MG-UNIT per tablet    OSCAL with D     Take 2 tablets by mouth every morning        citalopram 40 MG tablet    celeXA     Take 40 mg by mouth daily.        etanercept 50 MG/ML injection    ENBREL     Inject 50 mg Subcutaneous once a week On Saturdays        HYDROcodone-acetaminophen 5-325 MG per tablet    NORCO    15 tablet    Take 1-2 tablets by mouth every 4 hours as needed for moderate to severe pain        leflunomide 10 MG tablet    ARAVA     Take 20 mg by mouth every other day        methocarbamol 750 MG tablet    ROBAXIN    15 tablet    Take 1 tablet (750 mg) by mouth 3 times daily as needed for muscle spasms        methylPREDNISolone 4 MG tablet    MEDROL DOSEPAK    21 tablet    Follow package instructions        * metoprolol 25 MG tablet    LOPRESSOR     Take 25 mg by mouth every morning        * metoprolol 50 MG tablet    LOPRESSOR    135 tablet    25mg q am, 50mg q hs    Tachycardia       multivitamin, therapeutic Tabs tablet      Take 1 tablet by mouth daily        OMEGA-3 FISH OIL PO      Take 1 g by mouth daily        omeprazole 40 MG capsule    priLOSEC    90 capsule    Take 1 capsule (40 mg) by mouth daily Take 30-60 minutes before a  meal.    Gastroesophageal reflux disease without esophagitis       PREVACID 30 MG CR capsule   Generic drug:  LANsoprazole      Take 30 mg by mouth daily.        traZODone 75 mg Tabs half-tab    DESYREL     Take 75 mg by mouth At Bedtime.        VITAMIN D3 PO      Take 400 Units by mouth daily        * Notice:  This list has 2 medication(s) that are the same as other medications prescribed for you. Read the directions carefully, and ask your doctor or other care provider to review them with you.

## 2017-07-14 NOTE — LETTER
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 These impairments limit their ability to ambulate without pain. Skilled PT services are necessary in order to reduce impairments and improve independent function.  Precautions/Restrictions/MD instructions: Evaluate and treat       Subjective:    DOI/onset: 17  History of current symptoms: Bending over to lay down a mat in the bathtub on . Presented to ED. Then went to Dr. Segovia and her excruciating pain was gone.   Primary pain location: lower buttock (not in low back any longer)  Quality/radiation: down posterior leg  Exacerbated by: maybe bending?  Relieved by: rest  Pain: She denies having painful cough/sneeze, saddle anaesthesia, severe night pain, peripheral motor deficit, recent bowel/bladder change, recent vision change, ringing in the ears or pain with swallowing. Pain is rated 2/10 Currently, 0/10 at best, and 10/10 at worst.  Previous Treatment: Medication at hospital Effect of prior treatment: helpful    Imaging: Lumbar spine CT: Spondy at L3-4, L4-5, central and foraminal stenosis  MRI: moderate DDD from L2-S1; L5-S1 R disc extrusion which compresses S1 nerve root     Occupation: retired  Job duties/Hobbies: sittings, computer, driving     PMH: Anxiety and depression, Arthritis, GERD,  Heart arrhythmias, Rheumatoid arthritis  Medications: refer to medical chart  Sleeping: Sleeping goes well    Patient's goals: [facility]  Exercise class. Not afraid to pick things up from floor.   General health as reported by patient: good.     Previous functional status:  fully functional prior to pain onset/injury.     LUMBAR:  Gait: normal  Functional:  - Squat: no pain  PATIENT NAME:  Emma Jenkins   : 1945      AROM: (Major, Moderate, Minimal or Nil loss)  Movement Loss Johan Mod Min Nil Pain   Flexion   x  Decreased pain   Extension   x  Increased pain   Rotation Right        Rotation Left        Side bend Right        Side bend Left          Repeated movement testing: Pain down right leg was worse with extension better with flexion.    Hip PROM: wnl    Neurological:  Motor Deficit:  Myotomes L R   L1-2 (hip flexion) 5 5   L3 (knee extension) 5 5   L4 (ankle DF) 5 5   L5 (g. toe ext) 5 5   S1 (ankle PF or knee flex)       Other strength:   -Prone Glute: R 3+/5, L4-/5,    -Sidelying Hip abduction: R 3-/5, L4/5    Sensory Deficit, Reflexes: Seated patellar: both 3;      Light touch sensation: wnl    Dural Signs:   L R   Slump     SLR (-) (-)      Lumbar Mobility/Spring Testing: hypomobile    Palpation: TTP at glutes bilat    Assessment/Plan:    Patient is a 71-year-old female with lumbar complaints.    Patient has the following significant findings with corresponding treatment plan.                Diagnosis 1:  Low back pain with R radiculopathy  Pain -  hot/cold therapy, manual therapy, self management, education, directional preference exercise and home program  Decreased ROM/flexibility - manual therapy, therapeutic exercise and home program  Decreased joint mobility - manual therapy, therapeutic exercise and home program  Decreased strength - therapeutic exercise, therapeutic activities and home program  Impaired muscle performance - neuro re-education and home  program  Decreased function - therapeutic activities and home program  PATIENT NAME:  Emma Jenkins   : 1945      Therapy Evaluation Codes:   1) History comprised of:   Personal factors that impact the plan of care:     Past/current experiences and Short term memory loss per patient.    Comorbidity factors that impact the plan of care are:  Depression, Heart problems, High  blood pressure, Osteoarthritis and Rheumatoid arthritis.     Medications impacting care: Anti-depressant and Anti-inflammatory.  2) Examination of Body Systems comprised of:   Body structures and functions that impact the plan of care:  Lumbar spine.   Activity limitations that impact the plan of care are:  Bending, Sitting and Walking.  3) Clinical presentation characteristics are:  Stable/Uncomplicated.  4) Decision-Making:  Low complexity using standardized patient assessment instrument and/or measureable assessment of functional outcome.  Cumulative Therapy Evaluation is: Low complexity.    Previous and current functional limitations: (See Goal Flow Sheet for this information)    Short term and Long term goals: (See Goal Flow Sheet for this information)   Communication ability: Patient appears to be able to clearly communicate and understand verbal and written communication and follow directions correctly.  Treatment Explanation - The following has been discussed with the patient: RX ordered/plan of care  Anticipated outcomes  Possible risks and side effects  This patient would benefit from PT intervention to resume normal activities.   Rehab potential is good.  Frequency:  1 X week, once daily  Duration:  for 6 weeks  Discharge Plan:  Achieve all LTG.  Independent in home treatment program.  Reach maximal therapeutic benefit.      Caregiver Signature/Credentials _____________________________ Date __________           Treating Provider: Jon Schoenecker, PT       I have reviewed and certified the need for these services and plan of  "treatment while under my care.    PHYSICIAN'S SIGNATURE:   _____________________________________  Date___________            Ike Segovia      Certification period:  Beginning of Cert date period: 07/14/17 to  End of Cert period date: 10/11/17   Functional Level Progress Report: Please see attached \"Goal Flow sheet for Functional level.\"  ____X____ Continue Services or       ________ DC Services              Service dates: From  SOC Date: 07/14/17 date to present  "

## 2017-07-14 NOTE — PROGRESS NOTES
Subjective:  Physical Therapy Initial Evaluation    Therapist Impression:   Emma Jenkins is a 71 year old female patient presenting to Physical Therapy with: low back pain. These impairments limit their ability to ambulate without pain. Skilled PT services are necessary in order to reduce impairments and improve independent function.    Precautions/Restrictions/MD instructions: Evaluate and treat       Subjective:    DOI/onset: 6/20/17  History of current symptoms: Bending over to lay down a mat in the bathtub on 6/20. Presented to ED. Then went to Dr. Segovia and her excruciating pain was gone.   Primary pain location: lower buttock (not in low back any longer)  Quality/radiation: down posterior leg  Exacerbated by: maybe bending?  Relieved by: rest  Pain: She denies having painful cough/sneeze, saddle anaesthesia, severe night pain, peripheral motor deficit, recent bowel/bladder change, recent vision change, ringing in the ears or pain with swallowing. Pain is rated 2/10 Currently, 0/10 at best, and 10/10 at worst.  Previous Treatment: Medication at hospital Effect of prior treatment: helpful    Imaging:   Lumbar spine CT: Spondy at L3-4, L4-5, central and foraminal stenosis  MRI: moderate DDD from L2-S1; L5-S1 R disc extrusion which compresses S1 nerve root     Occupation: retired  Job duties/Hobbies: sittings, computer, driving     PMH: Anxiety and depression, Arthritis, GERD, Heart arrhythmias, Rheumatoid arthritis  Medications: refer to medical chart  Sleeping: Sleeping goes well    Patient's [facility] Exercise class. Not afraid to pick things up from floor.   General health as reported by patient: good.     Previous functional status:  fully functional prior to pain onset/injury.       LUMBAR:    Gait: normal    Functional:  - Squat: no pain    AROM: (Major, Moderate, Minimal or Nil loss)  Movement Loss Johan Mod Min Nil Pain   Flexion   x  Decreased pain   Extension   x   Increased pain   Rotation Right        Rotation Left        Side bend Right        Side bend Left          Repeated movement testing: Pain down right leg was worse with extension better with flexion.    Hip PROM: wnl    Neurological:    Motor Deficit:  Myotomes L R   L1-2 (hip flexion) 5 5   L3 (knee extension) 5 5   L4 (ankle DF) 5 5   L5 (g. toe ext) 5 5   S1 (ankle PF or knee flex)       Other strength:   -Prone Glute: R 3+/5, L4-/5,    -Sidelying Hip abduction: R 3-/5, L4/5    Sensory Deficit, Reflexes: Seated patellar: both 3;      Light touch sensation: wnl    Dural Signs:   L R   Slump     SLR (-) (-)        Lumbar Mobility/Spring Testing: hypomobile    Palpation: TTP at glutes bilat    HPI                    Objective:    System    Physical Exam    General     ROS    Assessment/Plan:      Patient is a 71 year old female with lumbar complaints.    Patient has the following significant findings with corresponding treatment plan.                Diagnosis 1:  Low back pain with R radiculopathy  Pain -  hot/cold therapy, manual therapy, self management, education, directional preference exercise and home program  Decreased ROM/flexibility - manual therapy, therapeutic exercise and home program  Decreased joint mobility - manual therapy, therapeutic exercise and home program  Decreased strength - therapeutic exercise, therapeutic activities and home program  Impaired muscle performance - neuro re-education and home program  Decreased function - therapeutic activities and home program    Therapy Evaluation Codes:   1) History comprised of:   Personal factors that impact the plan of care:      Past/current experiences and Short term memory loss per patient.    Comorbidity factors that impact the plan of care are:      Depression, Heart problems, High blood pressure, Osteoarthritis and Rheumatoid arthritis.     Medications impacting care: Anti-depressant and Anti-inflammatory.  2) Examination of Body Systems  comprised of:   Body structures and functions that impact the plan of care:      Lumbar spine.   Activity limitations that impact the plan of care are:      Bending, Sitting and Walking.  3) Clinical presentation characteristics are:   Stable/Uncomplicated.  4) Decision-Making    Low complexity using standardized patient assessment instrument and/or measureable assessment of functional outcome.  Cumulative Therapy Evaluation is: Low complexity.    Previous and current functional limitations:  (See Goal Flow Sheet for this information)    Short term and Long term goals: (See Goal Flow Sheet for this information)     Communication ability:  Patient appears to be able to clearly communicate and understand verbal and written communication and follow directions correctly.  Treatment Explanation - The following has been discussed with the patient:   RX ordered/plan of care  Anticipated outcomes  Possible risks and side effects  This patient would benefit from PT intervention to resume normal activities.   Rehab potential is good.    Frequency:  1 X week, once daily  Duration:  for 6 weeks  Discharge Plan:  Achieve all LTG.  Independent in home treatment program.  Reach maximal therapeutic benefit.    Please refer to the daily flowsheet for treatment today, total treatment time and time spent performing 1:1 timed codes.

## 2017-07-18 ENCOUNTER — OFFICE VISIT (OUTPATIENT)
Dept: INTERNAL MEDICINE | Facility: CLINIC | Age: 72
End: 2017-07-18
Payer: COMMERCIAL

## 2017-07-18 VITALS
RESPIRATION RATE: 14 BRPM | HEART RATE: 77 BPM | SYSTOLIC BLOOD PRESSURE: 134 MMHG | DIASTOLIC BLOOD PRESSURE: 78 MMHG | HEIGHT: 66 IN | TEMPERATURE: 98.3 F | WEIGHT: 157 LBS | BODY MASS INDEX: 25.23 KG/M2 | OXYGEN SATURATION: 94 %

## 2017-07-18 DIAGNOSIS — M79.675 PAIN OF TOE OF LEFT FOOT: ICD-10-CM

## 2017-07-18 DIAGNOSIS — M85.80 OSTEOPENIA, UNSPECIFIED LOCATION: ICD-10-CM

## 2017-07-18 DIAGNOSIS — R00.0 TACHYCARDIA: ICD-10-CM

## 2017-07-18 DIAGNOSIS — M05.79 SEROPOSITIVE RHEUMATOID ARTHRITIS OF MULTIPLE SITES (H): ICD-10-CM

## 2017-07-18 DIAGNOSIS — K21.9 GASTROESOPHAGEAL REFLUX DISEASE WITHOUT ESOPHAGITIS: ICD-10-CM

## 2017-07-18 DIAGNOSIS — Z12.31 VISIT FOR SCREENING MAMMOGRAM: ICD-10-CM

## 2017-07-18 DIAGNOSIS — B00.9 RECURRENT HSV (HERPES SIMPLEX VIRUS): ICD-10-CM

## 2017-07-18 DIAGNOSIS — Z00.00 ROUTINE GENERAL MEDICAL EXAMINATION AT A HEALTH CARE FACILITY: Primary | ICD-10-CM

## 2017-07-18 LAB
ERYTHROCYTE [DISTWIDTH] IN BLOOD BY AUTOMATED COUNT: 13.6 % (ref 10–15)
HCT VFR BLD AUTO: 40.6 % (ref 35–47)
HGB BLD-MCNC: 13.1 G/DL (ref 11.7–15.7)
MCH RBC QN AUTO: 29 PG (ref 26.5–33)
MCHC RBC AUTO-ENTMCNC: 32.3 G/DL (ref 31.5–36.5)
MCV RBC AUTO: 90 FL (ref 78–100)
PLATELET # BLD AUTO: 251 10E9/L (ref 150–450)
RBC # BLD AUTO: 4.52 10E12/L (ref 3.8–5.2)
WBC # BLD AUTO: 6.2 10E9/L (ref 4–11)

## 2017-07-18 PROCEDURE — 36415 COLL VENOUS BLD VENIPUNCTURE: CPT | Performed by: INTERNAL MEDICINE

## 2017-07-18 PROCEDURE — 84443 ASSAY THYROID STIM HORMONE: CPT | Performed by: INTERNAL MEDICINE

## 2017-07-18 PROCEDURE — 80053 COMPREHEN METABOLIC PANEL: CPT | Performed by: INTERNAL MEDICINE

## 2017-07-18 PROCEDURE — 85027 COMPLETE CBC AUTOMATED: CPT | Mod: GZ | Performed by: INTERNAL MEDICINE

## 2017-07-18 PROCEDURE — G0438 PPPS, INITIAL VISIT: HCPCS | Performed by: INTERNAL MEDICINE

## 2017-07-18 PROCEDURE — 80061 LIPID PANEL: CPT | Performed by: INTERNAL MEDICINE

## 2017-07-18 RX ORDER — ACYCLOVIR 400 MG/1
400 TABLET ORAL 3 TIMES DAILY
Qty: 15 TABLET | Refills: 5 | Status: SHIPPED | OUTPATIENT
Start: 2017-07-18 | End: 2018-06-30

## 2017-07-18 RX ORDER — METOPROLOL TARTRATE 50 MG
TABLET ORAL
Qty: 135 TABLET | Refills: 3 | Status: SHIPPED | OUTPATIENT
Start: 2017-07-18 | End: 2018-08-28

## 2017-07-18 RX ORDER — OMEPRAZOLE 40 MG/1
40 CAPSULE, DELAYED RELEASE ORAL DAILY
Qty: 90 CAPSULE | Refills: 3 | Status: SHIPPED | OUTPATIENT
Start: 2017-07-18 | End: 2018-08-03

## 2017-07-18 NOTE — PROGRESS NOTES
SUBJECTIVE:   Emma Jenkins is a 71 year old female who presents for Preventive Visit.    Are you in the first 12 months of your Medicare Part B coverage?  No    Healthy Habits:    Do you get at least three servings of calcium containing foods daily (dairy, green leafy vegetables, etc.)? yes    Amount of exercise or daily activities, outside of work: daily activities, walking 30 minutes per day    Problems taking medications regularly No    Medication side effects: No    Have you had an eye exam in the past two years? yes    Do you see a dentist twice per year? yes    Do you have sleep apnea, excessive snoring or daytime drowsiness?no    COGNITIVE SCREEN  1) Repeat 3 items (Banana, Sunrise, Chair)    2) Clock draw: NORMAL  3) 3 item recall: Recalls 3 objects  Results: 3 items recalled: COGNITIVE IMPAIRMENT LESS LIKELY    Mini-CogTM Copyright S Sissy. Licensed by the author for use in [facility]; reprinted with permission ([email or web address]@[email or web address]). All rights reserved.      Rheumatoid arthritis  Patient follows with rheumatologist, Dr Torres every 6 months. She has lab work every 3 months.     Depression  Patient relates her mood has been stable on citalopram and trazodone. She follows with Dr Gaming.     GERD  She trialed 40 mg omeprazole every other day, but did not find relief in symptoms with this regimen.     Genital herpes  Patient request acyclovir refill for herpes outbreak a couple of times a year. This was previously prescribed by Dr Calvo. Additionally, she notes she has a lump in right vaginal area.     ED follow up  Patient presented to the ED on June 20th for evaluation of lower back pain. She describes the pain as severe. CT and MRI were done. She is attending physical therapy.   MRI impression:  1. Moderate degenerative disc disease from L2-L3 through L5-S1.  2. Apophyseal joint degenerative arthrosis and degenerative grade 1  spondylolisthesis at L3-L4 and L4-L5.  3. L5-S1 right lateral  recess 0.7 cm disc extrusion or herniation  which appears to compress the right S1 nerve root.  4. Mild or borderline central stenosis at L3-L4.  5. Multilevel foraminal stenosis, greatest and moderate on the left at  L4-L5.      Pain, left toe  She notes the left 4th toe goes under her left 3rd toe. She believes tennis shoe aggravates this. She alleviates it by placing a piece of foam between toes.     Past/recent records reviewed and discussed for:  -Experiences palpitations if she does not take metoprolol   -Left cerumen impaction, would like ear wash   -Walking half an hour daily   -Reviewed DX scan from 2015.   -Colonoscopy in June 2013, two polyps resected and retrieved, and diverticulosis. Q 5 years. Due in 2018. She has family history of colon cancer in mother and brother.  -Past medical, family and social histories as well as medications reviewed and updated as needed.       Reviewed and updated as needed this visit by clinical staff  Tobacco  Allergies  Meds  Med Hx  Surg Hx  Fam Hx  Soc Hx        Reviewed and updated as needed this visit by Provider        Social History   Substance Use Topics     Smoking status: Former Smoker     Years: 10.00     Smokeless tobacco: Never Used     Alcohol use 2.5 oz/week     5 Glasses of wine per week      Comment: rare       The patient does not drink >3 drinks per day nor >7 drinks per week.    Today's PHQ-2 Score:   PHQ-2 ( 1999 [employer]) 7/18/2017 2/3/2017   Q1: Little interest or pleasure in doing things 0 0   Q2: Feeling down, depressed or hopeless 0 0   PHQ-2 Score 0 0       Do you feel safe in your environment - Yes    Do you have a Health Care Directive?: No: Advance care planning reviewed with patient; information given to patient to review.    Current providers sharing in care for this patient include:   Patient Care Team:  Gene Scott MD as PCP - General (Internal Medicine-Hematology & Oncology)  Karsten Malone MD as MD (Internal  Medicine)      Hearing impairment: No    Ability to successfully perform activities of daily living: Yes, no assistance needed     Fall risk:  Fallen 2 or more times in the past year?: No  Any fall with injury in the past year?: No    Home safety:  none identified      The following health maintenance items are reviewed in Epic and correct as of today:  Health Maintenance   Topic Date Due     ADVANCE DIRECTIVE PLANNING Q5 YRS  11/06/1963     PNEUMOCOCCAL (2 of 2 - PPSV23) 07/08/2016     PHQ-9 Q6 MONTHS  04/17/2017     FALL RISK ASSESSMENT  08/16/2017     INFLUENZA VACCINE (SYSTEM ASSIGNED)  09/01/2017     MAMMO SCREEN Q2 YR (SYSTEM ASSIGNED)  08/09/2018     LIPID SCREEN Q5 YR FEMALE (SYSTEM ASSIGNED)  07/12/2021     COLON CANCER SCREEN (SYSTEM ASSIGNED)  06/04/2023     TETANUS IMMUNIZATION (SYSTEM ASSIGNED)  10/17/2026     DEXA SCAN SCREENING (SYSTEM ASSIGNED)  Completed     Labs reviewed in EPIC    Pneumonia Vaccine:Adults age 65+ who received Pneumovax (PPSV23) at 65 years or older: Should be given PCV13 > 1 year after their most recent PPSV23    Mammogram Screening: Patient over age 50, mutual decision to screen reflected in health maintenance. No history of abnormal mammogram.    ROS:  C: NEGATIVE for fever, chills, change in weight  I: POSITIVE for lump on right groin NEGATIVE for worrisome rashes, moles or lesions  E: NEGATIVE for vision changes or irritation  E/M: POSITIVE for left cerumen impaction NEGATIVE for mouth and throat problems  R: NEGATIVE for significant cough or SOB  B: NEGATIVE for masses, tenderness or discharge  CV: NEGATIVE for chest pain, palpitations or peripheral edema  GI: POSITIVE for heartburn NEGATIVE for nausea, abdominal pain, or change in bowel habits  : NEGATIVE for frequency, dysuria, or hematuria  M: POSITIVE for lower back pain NEGATIVE for significant arthralgias or myalgia  N: NEGATIVE for weakness, dizziness or paresthesias  E: NEGATIVE for temperature intolerance,  "skin/hair changes  H: NEGATIVE for bleeding problems  P: NEGATIVE for changes in mood or affect    This document serves as a record of the services and decisions personally performed and made by Gene Scott MD. It was created on his behalf by Kathi Tidwell, a trained medical scribe. The creation of this document is based on the provider's statements to the medical scribe.  Kathi Tidwell July 18, 2017 9:50 AM       OBJECTIVE:   /78 (BP Location: Left arm, Patient Position: Sitting, Cuff Size: Adult Large)  Pulse 77  Temp 98.3  F (36.8  C) (Oral)  Resp 14  Ht 5' 5.5\" (1.664 m)  Wt 157 lb (71.2 kg)  SpO2 94%  Breastfeeding? No  BMI 25.73 kg/m2 Estimated body mass index is 25.73 kg/(m^2) as calculated from the following:    Height as of this encounter: 5' 5.5\" (1.664 m).    Weight as of this encounter: 157 lb (71.2 kg).     EXAM:   GENERAL APPEARANCE: healthy, alert and no distress  EYES: Eyes grossly normal to inspection, PERRL and conjunctivae and sclerae normal  HENT: ear canals and TM's normal, nose and mouth without ulcers or lesions, oropharynx clear and oral mucous membranes moist  NECK: no adenopathy, no asymmetry, masses, or scars and thyroid normal to palpation  RESP: lungs clear to auscultation - no rales, rhonchi or wheezes  BREAST: normal without masses, tenderness or nipple discharge and no palpable axillary masses or adenopathy  CV: regular rate and rhythm, normal S1 S2, no S3 or S4, no murmur, click or rub, no peripheral edema and peripheral pulses strong  ABDOMEN: soft, nontender, no hepatosplenomegaly, no masses and bowel sounds normal   (female): palpable and visible subcutaneous lump about 1 cm in diameter, mobile beneath the skin, located near the junction between the proximal right thigh and the right vulva. Normal female external genitalia. Pelvic/bimanual exam not performed.   MS: no musculoskeletal defects are noted and gait is age appropriate without ataxia  SKIN: no " suspicious lesions or rashes  NEURO: Normal strength and tone, sensory exam grossly normal, mentation intact and speech normal  PSYCH: mentation appears normal and affect normal/bright    ASSESSMENT / PLAN:   (Z00.00) Routine general medical examination at a health care facility  (primary encounter diagnosis)  Comment: Stable health. See epic orders.   Plan: Comprehensive metabolic panel, Lipid panel         reflex to direct LDL, TSH with free T4 reflex,         CBC with platelets          (M05.79) Seropositive rheumatoid arthritis of multiple sites (H)  Comment: Continue to follow with rheumatology every 6 months.     (R00.0) Tachycardia  Comment: Stable with metoprolol. Continue current meds.   Plan: metoprolol (LOPRESSOR) 50 MG tablet          (K21.9) Gastroesophageal reflux disease without esophagitis  Comment: Refill provided.   Plan: omeprazole (PRILOSEC) 40 MG capsule          (M85.80) Osteopenia, unspecified location  Comment: No indication to start treatment at this time. Recommended patient have repeat DX scan next year.     (B00.9) Recurrent HSV (herpes simplex virus)  Comment: Refill provided.  Plan: acyclovir (ZOVIRAX) 400 MG tablet          (Z12.31) Visit for screening mammogram  Plan: MA Screening Digital Bilateral          (M79.675) Pain of toe of left foot  Comment: Referral placed to podiatry as patient notes her 4th toe gets stuck under her 3rd toe.   Plan: PODIATRY/FOOT & ANKLE SURGERY REFERRAL            End of Life Planning:  Patient currently has an advanced directive: No.  I have verified the patient's ablity to prepare an advanced directive/make health care decisions.  Literature was provided to assist patient in preparing an advanced directive.    COUNSELING:  Reviewed preventive health counseling, as reflected in patient instructions       Regular exercise       Healthy diet/nutrition       Osteoporosis Prevention/Bone Health       Colon cancer screening      Estimated body mass index is  "25.73 kg/(m^2) as calculated from the following:    Height as of this encounter: 1.664 m (5' 5.5\").    Weight as of this encounter: 71.2 kg (157 lb).   reports that she has quit smoking. She quit after 10.00 years of use. She has never used smokeless tobacco.    Appropriate preventive services were discussed with this patient, including applicable screening as appropriate for cardiovascular disease, diabetes, osteopenia/osteoporosis, and glaucoma.  As appropriate for age/gender, discussed screening for colorectal cancer, prostate cancer, breast cancer, and cervical cancer. Checklist reviewing preventive services available has been given to the patient.    Reviewed patients plan of care and provided an AVS. The Basic Care Plan (routine screening as documented in Health Maintenance) for Emma meets the Care Plan requirement. This Care Plan has been established and reviewed with the Patient.    Counseling Resources:  ATP IV Guidelines  Pooled Cohorts Equation Calculator  Breast Cancer Risk Calculator  FRAX Risk Assessment  ICSI Preventive Guidelines  Dietary Guidelines for Americans, 2010  USDA's MyPlate  ASA Prophylaxis  Lung CA Screening    The information in this document, created by the medical scribe for me, accurately reflects the services I personally performed and the decisions made by me. I have reviewed and approved this document for accuracy prior to leaving the patient care area.  July 18, 2017 10:08 AM    Gene Scott MD  [facility]  "

## 2017-07-18 NOTE — PATIENT INSTRUCTIONS
Preventive Health Recommendations    Female Ages 65 +    Yearly exam:     See your health care provider every year in order to  o Review health changes.   o Discuss preventive care.    o Review your medicines if your doctor has prescribed any.      You no longer need a yearly Pap test unless you've had an abnormal Pap test in the past 10 years. If you have vaginal symptoms, such as bleeding or discharge, be sure to talk with your provider about a Pap test.      Every 1 to 2 years, have a mammogram.  If you are over 69, talk with your health care provider about whether or not you want to continue having screening mammograms.      Every 10 years, have a colonoscopy. Or, have a yearly FIT test (stool test). These exams will check for colon cancer.       Have a cholesterol test every 5 years, or more often if your doctor advises it.       Have a diabetes test (fasting glucose) every three years. If you are at risk for diabetes, you should have this test more often.       At age 65, have a bone density scan (DEXA) to check for osteoporosis (brittle bone disease).    Shots:    Get a flu shot each year.    Get a tetanus shot every 10 years.    Talk to your doctor about your pneumonia vaccines. There are now two you should receive - Pneumovax (PPSV 23) and Prevnar (PCV 13).    Talk to your doctor about the shingles vaccine.    Talk to your doctor about the hepatitis B vaccine.    Nutrition:     Eat at least 5 servings of fruits and vegetables each day.      Eat whole-grain bread, whole-wheat pasta and brown rice instead of white grains and rice.      Talk to your provider about Calcium and Vitamin D.     Lifestyle    Exercise at least 150 minutes a week (30 minutes a day, 5 days a week). This will help you control your weight and prevent disease.      Limit alcohol to one drink per day.      No smoking.       Wear sunscreen to prevent skin cancer.       See your dentist twice a year for an exam and cleaning.      See your  eye doctor every 1 to 2 years to screen for conditions such as glaucoma, macular degeneration and cataracts.      Everything looks fine!    Refills of medications have been faxed to your pharmacy.     If you notice the lump in the right vaginal area to get larger, tender, or just want it removed, we could try to get you in to see OB-GYN.    Contact information provided for Podiatry.     I'll get back to you with lab results soon, especially if there is anything of concern.      See you in a year, sooner if problems.

## 2017-07-18 NOTE — NURSING NOTE
"Chief Complaint   Patient presents with     Medicare Visit       Initial /78 (BP Location: Left arm, Patient Position: Sitting, Cuff Size: Adult Large)  Pulse 77  Temp 98.3  F (36.8  C) (Oral)  Resp 14  Ht 5' 5.5\" (1.664 m)  Wt 157 lb (71.2 kg)  SpO2 94%  Breastfeeding? No  BMI 25.73 kg/m2 Estimated body mass index is 25.73 kg/(m^2) as calculated from the following:    Height as of this encounter: 5' 5.5\" (1.664 m).    Weight as of this encounter: 157 lb (71.2 kg).  Medication Reconciliation: complete    "

## 2017-07-19 LAB
ALBUMIN SERPL-MCNC: 3.6 G/DL (ref 3.4–5)
ALP SERPL-CCNC: 106 U/L (ref 40–150)
ALT SERPL W P-5'-P-CCNC: 25 U/L (ref 0–50)
ANION GAP SERPL CALCULATED.3IONS-SCNC: 6 MMOL/L (ref 3–14)
AST SERPL W P-5'-P-CCNC: 22 U/L (ref 0–45)
BILIRUB SERPL-MCNC: 0.4 MG/DL (ref 0.2–1.3)
BUN SERPL-MCNC: 13 MG/DL (ref 7–30)
CALCIUM SERPL-MCNC: 9.3 MG/DL (ref 8.5–10.1)
CHLORIDE SERPL-SCNC: 104 MMOL/L (ref 94–109)
CHOLEST SERPL-MCNC: 279 MG/DL
CO2 SERPL-SCNC: 28 MMOL/L (ref 20–32)
CREAT SERPL-MCNC: 0.71 MG/DL (ref 0.52–1.04)
GFR SERPL CREATININE-BSD FRML MDRD: 80 ML/MIN/1.7M2
GLUCOSE SERPL-MCNC: 86 MG/DL (ref 70–99)
HDLC SERPL-MCNC: 51 MG/DL
LDLC SERPL CALC-MCNC: 207 MG/DL
NONHDLC SERPL-MCNC: 228 MG/DL
POTASSIUM SERPL-SCNC: 4.4 MMOL/L (ref 3.4–5.3)
PROT SERPL-MCNC: 7.2 G/DL (ref 6.8–8.8)
SODIUM SERPL-SCNC: 138 MMOL/L (ref 133–144)
TRIGL SERPL-MCNC: 105 MG/DL
TSH SERPL DL<=0.005 MIU/L-ACNC: 2.72 MU/L (ref 0.4–4)

## 2017-07-19 ASSESSMENT — PATIENT HEALTH QUESTIONNAIRE - PHQ9: SUM OF ALL RESPONSES TO PHQ QUESTIONS 1-9: 0

## 2017-07-20 ENCOUNTER — THERAPY VISIT (OUTPATIENT)
Dept: PHYSICAL THERAPY | Facility: CLINIC | Age: 72
End: 2017-07-20
Payer: MEDICARE

## 2017-07-20 DIAGNOSIS — M54.50 ACUTE RIGHT-SIDED LOW BACK PAIN WITHOUT SCIATICA: ICD-10-CM

## 2017-07-20 DIAGNOSIS — M99.33 OSSEOUS STENOSIS OF NEURAL CANAL OF LUMBAR REGION: ICD-10-CM

## 2017-07-20 PROCEDURE — 97110 THERAPEUTIC EXERCISES: CPT | Mod: GP | Performed by: PHYSICAL THERAPIST

## 2017-07-20 PROCEDURE — 97140 MANUAL THERAPY 1/> REGIONS: CPT | Mod: GP | Performed by: PHYSICAL THERAPIST

## 2017-07-27 ENCOUNTER — THERAPY VISIT (OUTPATIENT)
Dept: PHYSICAL THERAPY | Facility: CLINIC | Age: 72
End: 2017-07-27
Payer: MEDICARE

## 2017-07-27 DIAGNOSIS — M99.33 OSSEOUS STENOSIS OF NEURAL CANAL OF LUMBAR REGION: ICD-10-CM

## 2017-07-27 DIAGNOSIS — M54.50 ACUTE RIGHT-SIDED LOW BACK PAIN WITHOUT SCIATICA: ICD-10-CM

## 2017-07-27 PROCEDURE — 97140 MANUAL THERAPY 1/> REGIONS: CPT | Mod: GP | Performed by: PHYSICAL THERAPIST

## 2017-07-27 PROCEDURE — 97112 NEUROMUSCULAR REEDUCATION: CPT | Mod: GP | Performed by: PHYSICAL THERAPIST

## 2017-07-27 PROCEDURE — 97110 THERAPEUTIC EXERCISES: CPT | Mod: GP | Performed by: PHYSICAL THERAPIST

## 2017-08-03 ENCOUNTER — TELEPHONE (OUTPATIENT)
Dept: INTERNAL MEDICINE | Facility: CLINIC | Age: 72
End: 2017-08-03

## 2017-08-03 DIAGNOSIS — R04.0 EPISTAXIS: Primary | ICD-10-CM

## 2017-08-03 NOTE — TELEPHONE ENCOUNTER
Would discontinue fish oil, also aspirin if taking.   Recommend ENT consult--ordered. Please provide patient with contact information.       Letter completed, please mail along with lab results.

## 2017-08-03 NOTE — TELEPHONE ENCOUNTER
Pt called. Wants lab results from 7/18      Also pt has had 4 nosebleeds in 2d. Pt does not know why she is getting them. It starts to bleed out of the blue. Pt stated she can stop the nosebleeds, but it takes a while and pt has to lay down for a while. Pt does not see any sore in her nose. Pt is wondering what she can do? Is having company soon and is very anxious about it.

## 2017-08-03 NOTE — LETTER
"    [facility]  [address] [place] [address]  [phone]    8/3/2017    Emma Jenkins  [address] [facility] [id number]         Dear Ms. Jenkins,    The results of your lab tests are enclosed. Everything looks fine. Unless noted otherwise below, any results that are outside the \"normal\" range are within acceptable limits and are of no concern.    Your hemoglobin, white blood cell count, and platelet count looked fine.  Hemoglobin measures the amount of red blood cells carrying oxygen to the body's tissues. A low hemoglobin can cause symptoms of fatigue.  WBC Count measures White Blood Cells, the cells that fight infection. The percentages of various types of white blood cells are listed and look fine.  Platelets assist in normal blood clotting. Your platelet count looks normal.    LDL= Bad Cholesterol-- the target is below 130.     HDL= Good Cholesterol-- although this is determined mostly by heredity, exercise and/or medications may sometimes raise this number.    Triglycerides are another type of fat in the blood, and can sometimes be lowered by reducing intake of sweets or excess carbohydrates, alcohol, and by weight reduction if needed.  Sometimes medications are also used.    AST and ALT are liver tests, as are the bilirubin (total and direct), albumin, total protein, and alkaline phosphatase. Yours are all normal.     Urea Nitrogen and Creatinine are kidney tests--yours are normal. GFR stands for Glomerular Filtration Rate, a more precise estimate of kidney function.    Sodium, Potassium, Chloride, Carbon Dioxide, and Calcium are all normal salts in the bloodstream. Yours all look normal. Your glucose (blood sugar) also looks fine. (You can ignore the anion gap result).    TSH measures thyroid function. Yours is normal.    If you have any further questions or problems, please contact our office.    Sincerely,      JULEE CONLEY M.D.  Attachment: Lab results    "

## 2017-08-08 ENCOUNTER — TRANSFERRED RECORDS (OUTPATIENT)
Dept: HEALTH INFORMATION MANAGEMENT | Facility: CLINIC | Age: 72
End: 2017-08-08

## 2017-08-08 ENCOUNTER — THERAPY VISIT (OUTPATIENT)
Dept: PHYSICAL THERAPY | Facility: CLINIC | Age: 72
End: 2017-08-08
Payer: MEDICARE

## 2017-08-08 DIAGNOSIS — M54.50 ACUTE RIGHT-SIDED LOW BACK PAIN WITHOUT SCIATICA: ICD-10-CM

## 2017-08-08 DIAGNOSIS — M99.33 OSSEOUS STENOSIS OF NEURAL CANAL OF LUMBAR REGION: ICD-10-CM

## 2017-08-08 PROCEDURE — 97140 MANUAL THERAPY 1/> REGIONS: CPT | Mod: GP | Performed by: PHYSICAL THERAPIST

## 2017-08-08 PROCEDURE — 97110 THERAPEUTIC EXERCISES: CPT | Mod: GP | Performed by: PHYSICAL THERAPIST

## 2017-08-08 PROCEDURE — G8979 MOBILITY GOAL STATUS: HCPCS | Mod: GP | Performed by: PHYSICAL THERAPIST

## 2017-08-08 PROCEDURE — G8978 MOBILITY CURRENT STATUS: HCPCS | Mod: GP | Performed by: PHYSICAL THERAPIST

## 2017-08-08 PROCEDURE — G8980 MOBILITY D/C STATUS: HCPCS | Mod: GP | Performed by: PHYSICAL THERAPIST

## 2017-08-08 NOTE — PROGRESS NOTES
Subjective:    HPI                    Objective:    System    Physical Exam    General     ROS    Assessment/Plan:      DISCHARGE REPORT    Progress reporting period is from 7/14/17 to 8/8/17.       SUBJECTIVE  Subjective: She doesn't remember the last time she has had pain with daily activities. Back is much better.    Current Pain level: 0/10.      Initial Pain level: 2/10.   Changes in function:  Yes (See Goal flowsheet attached for changes in current functional level)  Adverse reaction to treatment or activity: None    OBJECTIVE  Objective: Full range of motion of the lumbar spine with no pain. Full strength in LE. Advanced exercises today so she can continue her strengthening 2x/week to prevent her LBP from returning.     ASSESSMENT/PLAN  Updated problem list and treatment plan: Diagnosis 1:  LBP  Pain -  home program  STG/LTGs have been met or progress has been made towards goals:  Yes (See Goal flow sheet completed today.)  Assessment of Progress: The patient has met all of their long term goals.  Self Management Plans:  Patient is independent in a home treatment program.  I have re-evaluated this patient and find that the nature, scope, duration and intensity of the therapy is appropriate for the medical condition of the patient.  Emma continues to require the following intervention to meet STG and LTG's:  PT intervention is no longer required to meet STG/LTG.    Recommendations:  This patient is ready to be discharged from therapy and continue their home treatment program.    Please refer to the daily flowsheet for treatment today, total treatment time and time spent performing 1:1 timed codes.

## 2017-08-08 NOTE — MR AVS SNAPSHOT
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 If you do, we will ask you to remove it before your exam.  Wear comfortable, two-piece clothing.  If you have any allergies, tell your care team.  Bring any previous mammograms from other facilities or have them mailed to the breast center. Three-dimensional (3D) mammograms are available at [facility] locations in [facility], and [place]. [facility] locations include [place] and [facility] in [place]. Benefits of 3D mammograms include: - Improved rate of cancer detection - Decreases your chance of having to go back for more tests, which means fewer: - \"False-positive\" results (This means that there is an abnormal area but it isn't cancer.) - Invasive testing procedures, such as a biopsy or surgery - Can provide clearer images of the breast if you have dense breast tissue. 3D mammography is an optional exam that anyone can have with a 2D mammogram. It doesn't replace or take the place of a 2D mammogram. 2D mammograms remain an effective screening test for all women.  Not all insurance companies cover the cost of a 3D mammogram. Check with your insurance. " "             Who to contact     If you have questions or need follow up information about today's clinic visit or your schedule please contact [facility] PHYSICAL THERAPY directly at [phone].  Normal or non-critical lab and imaging results will be communicated to you by MyChart, letter or phone within 4 business days after the clinic has received the results. If you do not hear from us within 7 days, please contact the clinic through MyChart or phone. If you have a critical or abnormal lab result, we will notify you by phone as soon as possible.  Submit refill requests through [employer] or call your pharmacy and they will forward the refill request to us. Please allow 3 business days for your refill to be completed.          Additional Information About Your Visit        [facility] [employer] Information     [employer] lets you send messages to your doctor, view your test results, renew your prescriptions, schedule appointments and more. To sign up, go to [email or web address] . Click on \"Log in\" on the left side of the screen, which will take you to the Welcome page. Then click on \"Sign up Now\" on the right side of the page.     You will be asked to enter the access code listed below, as well as some personal information. Please follow the directions to create your username and password.     Your access code is: [email or web address]  Expires: 2017  1:37 PM     Your access code will  in 90 days. If you need help or a new code, please call your [place] clinic or [phone].        Care EveryWhere ID     This is your Care EveryWhere ID. This could be used by other organizations to access your [place] medical records  [id number]         Blood Pressure from Last 3 Encounters:   17 134/78   17 157/87   17 141/90    Weight from Last 3 Encounters:   17 71.2 kg (157 lb)   17 71.2 kg (157 lb)   17 71.7 kg (158 lb)              We Performed the Following     SSUI " PROGRESS NOTES REPORT     MANUAL THER TECH,1+REGIONS,EA 15 MIN     THERAPEUTIC EXERCISES        Primary Care Provider Office Phone # Fax #    Gene Scott [phone]       [facility] [address] [facility] [address]  [facility] [address]        Equal Access to Services     LORI CABAN : Hadii aad ku hadasho Soomaali, waaxda luqadaha, qaybta kaalmada adeegyada, waxay joniin haychristiannen enedelia deanajacob laelizabeth torres. So [facility] [phone].    ATENCIÓN: Si habla español, tiene a monzon disposición servicios gratuitos de asistencia lingüística. Arnie al [phone].    We comply with applicable federal civil rights laws and [place] laws. We do not discriminate on the basis of race, color, national origin, age, disability sex, sexual orientation or gender identity.            Thank you!     Thank you for choosing [facility] PHYSICAL THERAPY  for your care. Our goal is always to provide you with excellent care. Hearing back from our patients is one way we can continue to improve our services. Please take a few minutes to complete the written survey that you may receive in the mail after your visit with us. Thank you!             Your Updated Medication List - Protect others around you: Learn how to safely use, store and throw away your medicines at www.disposemymeds.org.          This list is accurate as of: 8/8/17  1:54 PM.  Always use your most recent med list.                   Brand Name Dispense Instructions for use Diagnosis    acyclovir 400 MG tablet    ZOVIRAX    15 tablet    Take 1 tablet (400 mg) by mouth 3 times daily    Recurrent HSV (herpes simplex virus)       calcium carb 1250 mg (500 mg [place])/vitamin D 200 units 500-200 MG-UNIT per tablet    OSCAL with D     Take 2 tablets by mouth every morning        citalopram 40 MG tablet    celeXA     Take 40 mg by mouth daily.        etanercept 50 MG/ML injection    ENBREL     Inject 50 mg Subcutaneous once a week On Saturdays         leflunomide 10 MG tablet    ARAVA     Take 20 mg by mouth every other day        methocarbamol 750 MG tablet    ROBAXIN    15 tablet    Take 1 tablet (750 mg) by mouth 3 times daily as needed for muscle spasms        * metoprolol 25 MG tablet    LOPRESSOR     Take 25 mg by mouth every morning        * metoprolol 50 MG tablet    LOPRESSOR    135 tablet    25mg q am, 50mg q hs    Tachycardia       multivitamin, therapeutic Tabs tablet      Take 1 tablet by mouth daily        OMEGA-3 FISH OIL PO      Take 1 g by mouth daily        omeprazole 40 MG capsule    priLOSEC    90 capsule    Take 1 capsule (40 mg) by mouth daily Take 30-60 minutes before a meal.    Gastroesophageal reflux disease without esophagitis       PREVACID 30 MG CR capsule   Generic drug:  LANsoprazole      Take 30 mg by mouth daily.        traZODone 75 mg Tabs half-tab    DESYREL     Take 75 mg by mouth At Bedtime.        VITAMIN D3 PO      Take 400 Units by mouth daily        * Notice:  This list has 2 medication(s) that are the same as other medications prescribed for you. Read the directions carefully, and ask your doctor or other care provider to review them with you.

## 2017-08-10 ENCOUNTER — HOSPITAL ENCOUNTER (OUTPATIENT)
Dept: MAMMOGRAPHY | Facility: CLINIC | Age: 72
Discharge: HOME | End: 2017-08-10
Attending: INTERNAL MEDICINE | Admitting: INTERNAL MEDICINE
Payer: MEDICARE

## 2017-08-10 DIAGNOSIS — Z12.31 VISIT FOR SCREENING MAMMOGRAM: ICD-10-CM

## 2017-08-10 PROCEDURE — G0202 SCR MAMMO BI INCL CAD: HCPCS

## 2017-08-10 PROCEDURE — 77063 BREAST TOMOSYNTHESIS BI: CPT

## 2017-08-29 ENCOUNTER — TRANSFERRED RECORDS (OUTPATIENT)
Dept: HEALTH INFORMATION MANAGEMENT | Facility: CLINIC | Age: 72
End: 2017-08-29

## 2017-08-29 LAB
ALT SERPL-CCNC: 22 IU/L (ref 5–35)
AST SERPL-CCNC: 25 U/L (ref 5–34)
CREAT SERPL-MCNC: 0.66 MG/DL (ref 0.5–1.3)
GFR SERPL CREATININE-BSD FRML MDRD: 93.6 ML/MIN/1.73M2

## 2017-09-20 ENCOUNTER — ALLIED HEALTH/NURSE VISIT (OUTPATIENT)
Dept: PHARMACY | Facility: CLINIC | Age: 72
End: 2017-09-20
Payer: COMMERCIAL

## 2017-09-20 DIAGNOSIS — F33.9 RECURRENT MAJOR DEPRESSIVE DISORDER, REMISSION STATUS UNSPECIFIED (H): ICD-10-CM

## 2017-09-20 DIAGNOSIS — E63.9 NUTRITIONAL DEFICIENCY: ICD-10-CM

## 2017-09-20 DIAGNOSIS — E78.5 HYPERLIPIDEMIA LDL GOAL <100: ICD-10-CM

## 2017-09-20 DIAGNOSIS — M05.79 SEROPOSITIVE RHEUMATOID ARTHRITIS OF MULTIPLE SITES (H): Primary | ICD-10-CM

## 2017-09-20 DIAGNOSIS — K21.9 GASTROESOPHAGEAL REFLUX DISEASE, ESOPHAGITIS PRESENCE NOT SPECIFIED: ICD-10-CM

## 2017-09-20 PROCEDURE — 99207 ZZC NO CHARGE LOS: CPT | Performed by: PHARMACIST

## 2017-09-20 NOTE — PATIENT INSTRUCTIONS
"Recommendations from today's MTM visit:                                                    Today we reviewed what your medicines are for, how to know if they are working, that your medicines are safe and how to make your medicine regimen as easy as possible.     1. You \"bad cholesterol\" or LDL was 207 in July, anything over 190 we consider high risk for heart attack and stroke. You current 10 year risk is 18.2%. You would be indicated for a statin cholesterol medication, which can reduce your heart attack and stroke risk from 30-50%! I included \"Cholesterol Goals\" for some lifestyle modifications you can make to improve your cholesterol as well. Talk with Dr. Scott about this at your next visit!    2. Everything else seems well controlled! Call me with any questions.     Next MT visit: as needed    To schedule another MT appointment, please call the clinic directly or you may call the MTM scheduling line at [phone] or toll-free at [phone].     My Clinical Pharmacist's contact information:                                                      It was a pleasure seeing you today!  Please feel free to contact me with any questions or concerns you have.      Gene Bustillos, PharmD  MTM Pharmacist    Phone: [phone]     You may receive a survey about the [facility] services you received.  I would appreciate your feedback to help me serve you better in the future. Please fill it out and return it when you can. Your comments will be anonymous.      Cholesterol Goals:    Things you can do (in addition to taking medication) to lower your LDL cholesterol and triglycerides:    Lifestyle changes (based on research you may lower your LDL cholesterol by making the following changes):     Change LDL Reduction   Saturated Fat Decrease to <7% of calories 8-10%   Dietary Cholesterol Decrease to <200mg/day 3-5%   Weight  Lose 10 lbs if overweight 5-8%   Soluble Fiber  Add 5-10 grams/day 3-5%   Plant Stanols/Sterols Add 2 " "grams/day 5-15%   Total  20-30%   Adapted from http://www.nhlbi.nih.gov/health/public/heart/chol/chol_tlc.pdf    A few YOUTUBE Videos for some more information:    More information on plant stanols/sterols and how they work:  http://www.Reviews42ube.com/watch?v=WO537sKA_tU    Explanation of what cholesterol is:   http://www.Reviews42ube.com/watch?v=-DpGVk8XYqS&feature=related    The \"walking 30 minutes per day\" youtube video:  http://www.Reviews42ube.com/watch?v=hRjHrB3ICGq        "

## 2017-09-20 NOTE — LETTER
"     [facility] MEDICATION THERAPY MANAGEMENT     Date: 2017    Emma Jenkins  [address] [facility] [address]    Dear Ms. Jenkins,    Thank you for talking with me on 17 about your health and medications. Medicare s MTM (Medication Therapy Management) program helps you understand your medications and use them safely.      This letter includes an action plan (Medication Action Plan) and medication list (Personal Medication List). The action plan has steps you should take to help you get the best results from your medications. The medication list will help you keep track of your medications and how to use them the right way.       Have your action plan and medication list with you when you talk with your doctors, pharmacists, and other healthcare providers in your care team.     Ask your doctors, pharmacists, and other healthcare providers to update the action plan and medication list at every visit.     Take your medication list with you if you go to the hospital or emergency room.     Give a copy of the action plan and medication list to your family or caregivers.     If you want to talk about this letter or any of the papers with it, please call   [phone].   We look forward to working with you, your doctors, and other healthcare providers to help you stay healthy.     Sincerely,    Gene Bustillos      Enclosed: Medication Action Plan and Personal Medication List    MEDICATION ACTION PLAN FOR Emma Jenkins,  1945     This action plan will help you get the best results from your medications if you:   1. Read \"What we talked about.\"   2. Take the steps listed in the \"What I need to do\" boxes.   3. Fill in \"What I did and when I did it.\"   4. Fill in \"My follow-up plan\" and \"Questions I want to ask.\"     Have this action plan with you when you talk with your doctors, pharmacists, and other healthcare providers in your care team. Share this with your family or caregivers " too.  DATE PREPARED: 2017  What we talked about: Your high LDL cholesterol, the bad cholesterol. It is over 190, putting you at high risk for heart attack and stroke.                                                   What I need to do: Talk with Dr. Scott about starting a Statin to improve your cholesterol.        What I did and when I did it:                                              My follow-up plan:                 Questions I want to ask:              If you have any questions about your action plan, call Gene Bustillos, Phone: [phone] , Monday-Friday 8-4:30pm.           MEDICATION LIST FOR Emma Jenkins,  1945     This medication list was made for you after we talked. We also used information from your doctor's chart.      Use blank rows to add new medications. Then fill in the dates you started using them.    Cross out medications when you no longer use them. Then write the date and why you stopped using them.    Ask your doctors, pharmacists, and other healthcare providers to update this list at every visit. Keep this list up-to-date with:       Prescription medications    Over the counter drugs     Herbals    Vitamins    Minerals      If you go to the hospital or emergency room, take this list with you. Share this with your family or caregivers too.     DATE PREPARED: 2017  Allergies or side effects: Review of patient's allergies indicates no known allergies.     Medication:  ACYCLOVIR 400 MG PO TABS      How I use it:  Take 1 tablet (400 mg) by mouth 3 times daily      Why I use it: Recurrent HSV (herpes simplex virus)    Prescriber:  Gene Scott MD      Date I started using it:       Date I stopped using it:         Why I stopped using it:            Medication:  CALCIUM-VITAMIN D 500-200 MG-UNIT PO TABS      How I use it:  Take 1 tablet by mouth every morning       Why I use it:      Prescriber:  Entered By History Unknown      Date I started using it:       Date  I stopped using it:         Why I stopped using it:            Medication:  CITALOPRAM HYDROBROMIDE 40 MG PO TABS      How I use it:  Take 20 mg by mouth daily       Why I use it:  Depression    Prescriber:  Patient Reported      Date I started using it:       Date I stopped using it:         Why I stopped using it:            Medication:  ETANERCEPT 50 MG/ML SC SOLN      How I use it:  Inject 50 mg Subcutaneous once a week On Saturdays      Why I use it:  RA    Prescriber:  Patient Reported         Date I started using it:       Date I stopped using it:         Why I stopped using it:            Medication:  LEFLUNOMIDE 10 MG PO TABS      How I use it:  Take 20 mg by mouth every other day       Why I use it:  RA    Prescriber:  Patient Reported      Date I started using it:       Date I stopped using it:         Why I stopped using it:            Medication:  METOPROLOL TARTRATE 50 MG PO TABS      How I use it:  25mg q am, 50mg q hs      Why I use it: Tachycardia    Prescriber:  Gene Scott MD      Date I started using it:       Date I stopped using it:         Why I stopped using it:            Medication:  OMEGA-3 FISH OIL PO      How I use it:  Take 1 g by mouth daily       Why I use it:  supplement    Prescriber:  Patient Reported      Date I started using it:       Date I stopped using it:         Why I stopped using it:            Medication:  OMEPRAZOLE 40 MG PO CPDR      How I use it:  Take 1 capsule (40 mg) by mouth daily Take 30-60 minutes before a meal.      Why I use it: Gastroesophageal reflux disease without esophagitis    Prescriber:  Gene Scott MD      Date I started using it:       Date I stopped using it:         Why I stopped using it:            Medication:  THERA PO TABS      How I use it:  Take 1 tablet by mouth daily      Why I use it:  supplement    Prescriber:  Patient Reported      Date I started using it:       Date I stopped using it:         Why I stopped using it:             Medication:  TRAZODONE HCL 75 MG PO HALF-TABS      How I use it:  Take 50 mg by mouth At Bedtime       Why I use it:  Insomnia    Prescriber:  Patient Reported      Date I started using it:       Date I stopped using it:         Why I stopped using it:            Medication:  VITAMIN D3 PO      How I use it:  Take 2,500 Units by mouth daily       Why I use it:  supplement    Prescriber:  Patient Reported      Date I started using it:       Date I stopped using it:         Why I stopped using it:            Medication:         How I use it:         Why I use it:      Prescriber:         Date I started using it:       Date I stopped using it:         Why I stopped using it:            Medication:         How I use it:         Why I use it:      Prescriber:         Date I started using it:       Date I stopped using it:         Why I stopped using it:            Medication:         How I use it:         Why I use it:      Prescriber:         Date I started using it:       Date I stopped using it:         Why I stopped using it:              Other Information:     If you have any questions about your action plan, call [phone].    According to the Paperwork Reduction Act of 1995, no persons are required to respond to a collection of information unless it displays a valid OMB control number. The valid OMB number for this information collection is 0607-9715. The time required to complete this information collection is estimated to average 40 minutes per response, including the time to review instructions, searching existing data resources, gather the data needed, and complete and review the information collection. If you have any comments concerning the accuracy of the time estimate(s) or suggestions for improving this form, please write to: CMS, Attn: MACIEL Reports Clearance Officer, [address] [place] [id number].

## 2017-09-20 NOTE — PROGRESS NOTES
SUBJECTIVE/OBJECTIVE:                           Emma Jenkins is a 71 year old female called for an initial visit for Medication Therapy Management.  She was referred to me from [facility].     Chief Complaint: none.  Personal Healthcare Goals: Wants to be healthy. Goes to PT for back pain, has rheumatoid arthritis.     Allergies/ADRs: None  Tobacco: No tobacco use  Alcohol: 1-3 beverages / week  Caffeine: 2 cups/day of coffee  Activity: Does PT activities twice daily on her own. 1 hour of activity a day.  PMH: Per patient Back pain, RA, multiple surgeries on hands and feet.     Medication Adherence/Access  The patient misses their medication 0 times per week. She is responsible for his/her own medications.   Adherence/Compliance is described as excellent (100%).  Medication barriers: no issues reported by patient.  The patient fills general medications at  [facility].    Has the patient been offered to fill at [facility]? Yes    GERD: Current medications include: Prilosec (omeprazole) 40mg daily. Pt c/o heartburn when she goes down to 20mg, she is unable to taper off. Patient feels that current regimen is effective.    RA: Pt is taking Leflunomide 10mg daily, Enbrel 50mg qweekly. States her pain is excellently controlled.     Depression/Anxiety/ Insomnia:  Current medications include: Citalopram 40mg daily, Trazodone 50mg qHS.   Pt is very happy with therapy, works very well.   PHQ-9 SCORE 10/17/2016 7/18/2017   Total Score 0 0     Supplements: Pt is taking Fish oil daily, Vitamin D 2500IU daily, Calcium 600mg daily (has glass of milk a day, with cereal/ oatmeal as well).     Hyperlipidemia: Current therapy includes none. No family hx of CVD.  The 10-year ASCVD risk score (Carlos RUSTY Jr, et al., 2013) is: 18.2%    Values used to calculate the score:      Age: 71 years      Sex: Female      Is Non- : No      Diabetic: No      Tobacco smoker: No      Systolic Blood Pressure: 134 mmHg      Is BP treated:  Yes      HDL Cholesterol: 51 mg/dL      Total Cholesterol: 279 mg/dL    Current labs include:BP Readings from Last 3 Encounters:   07/18/17 134/78   07/12/17 157/87   06/20/17 141/90     Today's Vitals: There were no vitals taken for this visit.  No results found for: A1C.  Lab Results   Component Value Date    CHOL 279 07/18/2017     Lab Results   Component Value Date    TRIG 105 07/18/2017     Lab Results   Component Value Date    HDL 51 07/18/2017     Lab Results   Component Value Date     07/18/2017       Liver Function Studies -   Recent Labs   Lab Test 08/29/17 07/18/17   1029   PROTTOTAL   --   7.2   ALBUMIN   --   3.6   BILITOTAL   --   0.4   ALKPHOS   --   106   AST  25  22   ALT  22  25       No results found for: UCRR, MICROL, UMALCR    Last Basic Metabolic Panel:  Lab Results   Component Value Date     07/18/2017      Lab Results   Component Value Date    POTASSIUM 4.4 07/18/2017     Lab Results   Component Value Date    CHLORIDE 104 07/18/2017     Lab Results   Component Value Date    BUN 13 07/18/2017     Lab Results   Component Value Date    CR 0.660 08/28/2017     GFR Estimate   Date Value Ref Range Status   08/28/2017 93.6 mL/min/1.73m2 Final   07/18/2017 80 >60 mL/min/1.7m2 Final     Comment:     Non  GFR Calc   05/25/2017 84.6 ml/min/1.73m2 Final     TSH   Date Value Ref Range Status   07/18/2017 2.72 0.40 - 4.00 mU/L Final     Most Recent Immunizations   Administered Date(s) Administered     Influenza (H1N1) 12/21/2009     Influenza (intradermal) 09/29/2010     Pneumococcal (PCV 13) 07/08/2015     TDAP Vaccine (Adacel) 10/17/2016       ASSESSMENT:                             Current medications were reviewed today.     Medication Adherence: no issues identified    GERD: Stable. Pt unable to tolerate D/C, or even a dose reduction of her PPI.     RA: Stable. Well controlled.      Depression/Anxiety/ Insomnia:  Stable. Well controlled.     Supplements: Stable.      Hyperlipidemia: Needs Improvement. Pt is not on high intensity statin which is indicated based on 2013 ACC/AHA guidelines for lipid management. Pt would benefit from initiating a high intensity statin. Low saturated fat/high soluble fiber diet encouraged. Exercise encouraged. Risks and benefits of medication discussed.    PLAN:                            Dr. Scott...  1. Pt is indicated to start high intensity statin due to LDL being >190.    I spent 30 minutes with this patient today (an extra 15 minutes was spent creating the Medication Action Plan). All changes were made via collaborative practice agreement with Gene Scott. A copy of the visit note was provided to the patient's primary care provider.    Will follow up as needed.    The patient was mailed a summary of these recommendations as an after visit summary.     Gene Bustillos, PharmD  [facility] Pharmacist    Phone: [phone]

## 2017-09-20 NOTE — MR AVS SNAPSHOT
"              After Visit Summary   9/20/2017    Emma Jenkins    MRN: [id number]           Patient Information     Date Of Birth          1945        Visit Information        Provider Department      9/20/2017 10:30 [facility]        Today's Diagnoses     Seropositive rheumatoid arthritis of multiple sites (H)    -  1    Recurrent major depressive disorder, remission status unspecified (H)        Gastroesophageal reflux disease, esophagitis presence not specified        Hyperlipidemia LDL goal <100        Nutritional deficiency          Care Instructions    Recommendations from today's MTM visit:                                                    Today we reviewed what your medicines are for, how to know if they are working, that your medicines are safe and how to make your medicine regimen as easy as possible.     1. You \"bad cholesterol\" or LDL was 207 in July, anything over 190 we consider high risk for heart attack and stroke. You current 10 year risk is 18.2%. You would be indicated for a statin cholesterol medication, which can reduce your heart attack and stroke risk from 30-50%! I included \"Cholesterol Goals\" for some lifestyle modifications you can make to improve your cholesterol as well. Talk with Dr. Scott about this at your next visit!    2. Everything else seems well controlled! Call me with any questions.     Next MTM visit: as needed    To schedule another MTM appointment, please call the clinic directly or you may call the MTM scheduling line at [phone] or toll-free at [phone].     My Clinical Pharmacist's contact information:                                                      It was a pleasure seeing you today!  Please feel free to contact me with any questions or concerns you have.      Gene Bustillos, PharmD  MTM Pharmacist    Phone: [phone]     You may receive a survey about the MT services you received.  I " "would appreciate your feedback to help me serve you better in the future. Please fill it out and return it when you can. Your comments will be anonymous.      Cholesterol Goals:    Things you can do (in addition to taking medication) to lower your LDL cholesterol and triglycerides:    Lifestyle changes (based on research you may lower your LDL cholesterol by making the following changes):     Change LDL Reduction   Saturated Fat Decrease to <7% of calories 8-10%   Dietary Cholesterol Decrease to <200mg/day 3-5%   Weight  Lose 10 lbs if overweight 5-8%   Soluble Fiber  Add 5-10 grams/day 3-5%   Plant Stanols/Sterols Add 2 grams/day 5-15%   Total  20-30%   Adapted from http://www.nhlbi.nih.gov/health/public/heart/chol/chol_tlc.pdf    A few YOUTUBE Videos for some more information:    More information on plant stanols/sterols and how they work:  http://www.[employer]otechnologies[employer].com/watch?v=DC119aZQ_tO    Explanation of what cholesterol is:   http://www.[employer]ote[employer].com/watch?v=-IeVRe0VRfD&feature=related    The \"walking 30 minutes per day\" youtube video:  http://www.youRavenflowube.com/watch?v=zXjJuK5AYRp                Follow-ups after your visit        Who to contact     If you have questions or need follow up information about today's clinic visit or your schedule please contact [facility] MEDICATION THERAPY MANAGEMENT directly at [phone].  Normal or non-critical lab and imaging results will be communicated to you by MyChart, letter or phone within 4 business days after the clinic has received the results. If you do not hear from us within 7 days, please contact the clinic through [employer] or phone. If you have a critical or abnormal lab result, we will notify you by phone as soon as possible.  Submit refill requests through [employer] or call your pharmacy and they will forward the refill request to us. Please allow 3 business days for your refill to be completed.          Additional Information About Your Visit        MyChart Information     " "[employer] lets you send messages to your doctor, view your test results, renew your prescriptions, schedule appointments and more. To sign up, go to [email or web address] . Click on \"Log in\" on the left side of the screen, which will take you to the Welcome page. Then click on \"Sign up Now\" on the right side of the page.     You will be asked to enter the access code listed below, as well as some personal information. Please follow the directions to create your username and password.     Your access code is: [email or web address]  Expires: 2017  6:31 AM     Your access code will  in 90 days. If you need help or a new code, please call your [place] clinic or [phone].        Care EveryWhere ID     This is your [facility] EveryWhere ID. This could be used by other organizations to access your [place] medical records  [id number]         Blood Pressure from Last 3 Encounters:   17 134/78   17 157/87   17 141/90    Weight from Last 3 Encounters:   17 157 lb (71.2 kg)   17 157 lb (71.2 kg)   17 158 lb (71.7 kg)              Today, you had the following     No orders found for display         Today's Medication Changes          These changes are accurate as of: 17 11:12 AM.  If you have any questions, ask your nurse or doctor.               These medicines have changed or have updated prescriptions.        Dose/Directions    metoprolol 50 MG tablet   Commonly known as:  LOPRESSOR   This may have changed:  Another medication with the same name was removed. Continue taking this medication, and follow the directions you see here.   Used for:  Tachycardia        25mg q am, 50mg q hs   Quantity:  135 tablet   Refills:  3         Stop taking these medicines if you haven't already. Please contact your care team if you have questions.     methocarbamol 750 MG tablet   Commonly known as:  ROBAXIN           PREVACID 30 MG CR capsule   Generic drug:  LANsoprazole                    " Primary Care Provider Office Phone # Fax #    Gene Scott [phone]       [address] [facility] [address]  [facility] [address]        Equal Access to Services     TRICEISA MEE : Hadlia bebeto diallo stacy Easley, wamayada luqbrissa, qaybta kaalmada kem, raúl mcmahones melissa. So [facility] [phone].    ATENCIÓN: Si habla español, tiene a monzon disposición servicios gratuitos de asistencia lingüística. Llame al [phone].    We comply with applicable federal civil rights laws and [place] laws. We do not discriminate on the basis of race, color, national origin, age, disability sex, sexual orientation or gender identity.            Thank you!     Thank you for choosing [facility] MEDICATION THERAPY MANAGEMENT  for your care. Our goal is always to provide you with excellent care. Hearing back from our patients is one way we can continue to improve our services. Please take a few minutes to complete the written survey that you may receive in the mail after your visit with us. Thank you!             Your Updated Medication List - Protect others around you: Learn how to safely use, store and throw away your medicines at www.disposemymeds.org.          This list is accurate as of: 9/20/17 11:12 AM.  Always use your most recent med list.                   Brand Name Dispense Instructions for use Diagnosis    acyclovir 400 MG tablet    ZOVIRAX    15 tablet    Take 1 tablet (400 mg) by mouth 3 times daily    Recurrent HSV (herpes simplex virus)       calcium carb 1250 mg (500 mg [place])/vitamin D 200 units 500-200 MG-UNIT per tablet    OSCAL with D     Take 1 tablet by mouth every morning        citalopram 40 MG tablet    celeXA     Take 20 mg by mouth daily        etanercept 50 MG/ML injection    ENBREL     Inject 50 mg Subcutaneous once a week On Saturdays        leflunomide 10 MG tablet    ARAVA     Take 20 mg by mouth every other day        metoprolol 50 MG tablet    LOPRESSOR    135 tablet     25mg q am, 50mg q hs    Tachycardia       multivitamin, therapeutic Tabs tablet      Take 1 tablet by mouth daily        OMEGA-3 FISH OIL PO      Take 1 g by mouth daily        omeprazole 40 MG capsule    priLOSEC    90 capsule    Take 1 capsule (40 mg) by mouth daily Take 30-60 minutes before a meal.    Gastroesophageal reflux disease without esophagitis       traZODone 75 mg Tabs half-tab    DESYREL     Take 50 mg by mouth At Bedtime        VITAMIN D3 PO      Take 2,500 Units by mouth daily

## 2017-09-22 ENCOUNTER — TELEPHONE (OUTPATIENT)
Dept: INTERNAL MEDICINE | Facility: CLINIC | Age: 72
End: 2017-09-22

## 2017-09-22 DIAGNOSIS — E78.5 HYPERLIPIDEMIA LDL GOAL <130: Primary | ICD-10-CM

## 2017-09-22 NOTE — TELEPHONE ENCOUNTER
Pt left voice message at 0848 stating she was told by a [place] Pharmacist that her cholesterol was elevated and they thought she should be taking Lipitor. Pt has not received recommendations from Dr. Scott and asks for a call back.     Contacted pt. She states she was not aware of elevated cholesterol until the pharmacist told her about it. Pt asks if she should be taking any medication and what non-medications measures she can take. Advised pt that non-medication measures include healthy weight, low fat/low cholesterol diet and regular exercise. Will send to Dr. Scott to review if she needs to start a cholesterol medication.     She can be reached on her cell phone after 11am today - [phone].

## 2017-09-22 NOTE — LETTER
"  [facility]  [address] [place] [address]  [phone]     September 26, 2017      Emma Jenkins  [address] [facility] [address]            Dear MsJeronimo Alice,     The results of your lab tests are enclosed. Unless noted otherwise below, any results that are outside the \"normal\" range are within acceptable limits and are of no concern.     Your hemoglobin, white blood cell count, and platelet count looked fine.  Hemoglobin measures the amount of red blood cells carrying oxygen to the body's tissues. A low hemoglobin can cause symptoms of fatigue.  WBC Count measures White Blood Cells, the cells that fight infection. The percentages of various types of white blood cells are listed and look fine.  Platelets assist in normal blood clotting. Your platelet count looks normal.     LDL= Bad Cholesterol-- the target is below 130.   Your LDL-Cholesterol (\"bad cholesterol\") very high at 207. Diet/exercise is okay to try first, though not likely to get you to your target with your initial LDL-Cholesterol this high.   If you try diet/exercise, I recommend rechecking a fasting lipid panel after 2-3 months.      HDL= Good Cholesterol-- although this is determined mostly by heredity, exercise and/or medications may sometimes raise this number.     Triglycerides are another type of fat in the blood, and can sometimes be lowered by reducing intake of sweets or excess carbohydrates, alcohol, and by weight reduction if needed.  Sometimes medications are also used.     AST and ALT are liver tests, as are the bilirubin (total and direct), albumin, total protein, and alkaline phosphatase. Yours are all normal.      Urea Nitrogen and Creatinine are kidney tests--yours are normal. GFR stands for Glomerular Filtration Rate, a more precise estimate of kidney function.     Sodium, Potassium, Chloride, Carbon Dioxide, and Calcium are all normal salts in the bloodstream. Yours all look normal. Your " glucose (blood sugar) also looks fine. (You can ignore the anion gap result).         TSH measures thyroid function. Yours is normal.     If you have any further questions or problems, please contact our office.     Sincerely,        JULEE CONLEY M.D.  Attachment: Lab results

## 2017-09-22 NOTE — LETTER
"    [facility]  [address] [place] [address]  [phone]     September 26, 2017      Emma Jenkins  [address] [facility] [address]            Dear MsJeronimo Alice,     The results of your lab tests are enclosed. Everything looks fine. Unless noted otherwise below, any results that are outside the \"normal\" range are within acceptable limits and are of no concern.     Your hemoglobin, white blood cell count, and platelet count looked fine.  Hemoglobin measures the amount of red blood cells carrying oxygen to the body's tissues. A low hemoglobin can cause symptoms of fatigue.  WBC Count measures White Blood Cells, the cells that fight infection. The percentages of various types of white blood cells are listed and look fine.  Platelets assist in normal blood clotting. Your platelet count looks normal.     LDL= Bad Cholesterol-- the target is below 130.   Your LDL-Cholesterol (\"bad cholesterol\") very high at 207. Diet/exercise is okay to try first, though not likely to get you to your target with your initial LDL-Cholesterol this high.   If you try diet/exercise, I recommend rechecking a fasting lipid panel after 2-3 months.      HDL= Good Cholesterol-- although this is determined mostly by heredity, exercise and/or medications may sometimes raise this number.     Triglycerides are another type of fat in the blood, and can sometimes be lowered by reducing intake of sweets or excess carbohydrates, alcohol, and by weight reduction if needed.  Sometimes medications are also used.     AST and ALT are liver tests, as are the bilirubin (total and direct), albumin, total protein, and alkaline phosphatase. Yours are all normal.      Urea Nitrogen and Creatinine are kidney tests--yours are normal. GFR stands for Glomerular Filtration Rate, a more precise estimate of kidney function.     Sodium, Potassium, Chloride, Carbon Dioxide, and Calcium are all normal salts in the bloodstream. " Yours all look normal. Your glucose (blood sugar) also looks fine. (You can ignore the anion gap result).     TSH measures thyroid function. Yours is normal.     If you have any further questions or problems, please contact our office.     Sincerely,        JULEE CONLEY M.D.  Attachment: Lab results

## 2017-09-25 NOTE — TELEPHONE ENCOUNTER
Pt left voice message at 0849 stating she has not heard back from our office regarding cholesterol levels being elevated.     Contacted pt, advised that Dr. Scott is out of the office today. Pt would like to try diet/exercise to lose weight first if Dr. Scott is okay with it.

## 2017-09-26 NOTE — TELEPHONE ENCOUNTER
"LDL-Cholesterol (\"bad cholesterol\") very high at 207. Diet/exercise okay to try first, though not likely to get her to target with initial LDL-Cholesterol this high.   If she tries diet/exercise, recommend rechecking lipid panel after 2-3 months.     Please advise pt.    Letter completed, please mail along with lab results.    "

## 2017-09-28 ENCOUNTER — TELEPHONE (OUTPATIENT)
Dept: INTERNAL MEDICINE | Facility: CLINIC | Age: 72
End: 2017-09-28

## 2017-09-28 DIAGNOSIS — E78.5 HYPERLIPIDEMIA LDL GOAL <130: Primary | ICD-10-CM

## 2017-09-28 RX ORDER — ROSUVASTATIN CALCIUM 20 MG/1
20 TABLET, COATED ORAL DAILY
Qty: 90 TABLET | Refills: 0 | Status: SHIPPED | OUTPATIENT
Start: 2017-09-28 | End: 2018-07-09

## 2017-09-28 NOTE — TELEPHONE ENCOUNTER
Pt calls, states she has left 2 messages with our office regarding her cholesterol levels, but never received a call back. See 9/22/17 Telephone encounter - per chart notes pt was called back and letter was mailed to her on 9/27/17.     Reviewed recommendations from Dr. Scott with pt. Pt is concerned about cholesterol medication interfering with her other medications. She would like to try diet and exercise first, but asks if Dr. Scott can send prescription for cholesterol medication to start in case the diet and exercise do not go well. If she is able to start loosing weight she will recheck labs in 3 months.

## 2017-09-28 NOTE — TELEPHONE ENCOUNTER
"Okay with trying diet/exercise first Faxed Rx to her pharmacy if/when she is ready to try a medication.   Ordered future \"lab only\" appointment.   Please advise pt.   "

## 2017-10-24 ENCOUNTER — TELEPHONE (OUTPATIENT)
Dept: INTERNAL MEDICINE | Facility: CLINIC | Age: 72
End: 2017-10-24

## 2017-10-24 NOTE — TELEPHONE ENCOUNTER
Pt calls to discuss her lipids.  She says she has lost 5 pounds and would like to see how this is has effected her lipids before starting Crestor.  Her rheumatologist, Dr Johnson, checks her kidney and liver functions about every 3 months, and we do have these reports.  It isn't necessary for those to be rechecked.  She is scheduled with him in December to have those values checked.    She was scheduled with lab to have her lipids checked this Thursday, and will see how those numbers are before she starts Crestor.

## 2017-10-26 DIAGNOSIS — E78.5 HYPERLIPIDEMIA LDL GOAL <130: ICD-10-CM

## 2017-10-26 LAB
ALBUMIN SERPL-MCNC: 3.6 G/DL (ref 3.4–5)
ALP SERPL-CCNC: 96 U/L (ref 40–150)
ALT SERPL W P-5'-P-CCNC: 24 U/L (ref 0–50)
ANION GAP SERPL CALCULATED.3IONS-SCNC: 9 MMOL/L (ref 3–14)
AST SERPL W P-5'-P-CCNC: 23 U/L (ref 0–45)
BILIRUB SERPL-MCNC: 0.6 MG/DL (ref 0.2–1.3)
BUN SERPL-MCNC: 11 MG/DL (ref 7–30)
CALCIUM SERPL-MCNC: 9.5 MG/DL (ref 8.5–10.1)
CHLORIDE SERPL-SCNC: 103 MMOL/L (ref 94–109)
CHOLEST SERPL-MCNC: 251 MG/DL
CO2 SERPL-SCNC: 27 MMOL/L (ref 20–32)
CREAT SERPL-MCNC: 0.69 MG/DL (ref 0.52–1.04)
GFR SERPL CREATININE-BSD FRML MDRD: 84 ML/MIN/1.7M2
GLUCOSE SERPL-MCNC: 96 MG/DL (ref 70–99)
HDLC SERPL-MCNC: 51 MG/DL
LDLC SERPL CALC-MCNC: 177 MG/DL
NONHDLC SERPL-MCNC: 200 MG/DL
POTASSIUM SERPL-SCNC: 4.7 MMOL/L (ref 3.4–5.3)
PROT SERPL-MCNC: 7.1 G/DL (ref 6.8–8.8)
SODIUM SERPL-SCNC: 139 MMOL/L (ref 133–144)
TRIGL SERPL-MCNC: 117 MG/DL

## 2017-10-26 PROCEDURE — 80061 LIPID PANEL: CPT | Performed by: INTERNAL MEDICINE

## 2017-10-26 PROCEDURE — 80053 COMPREHEN METABOLIC PANEL: CPT | Performed by: INTERNAL MEDICINE

## 2017-10-26 PROCEDURE — 36415 COLL VENOUS BLD VENIPUNCTURE: CPT | Performed by: INTERNAL MEDICINE

## 2017-11-20 ENCOUNTER — TELEPHONE (OUTPATIENT)
Dept: INTERNAL MEDICINE | Facility: CLINIC | Age: 72
End: 2017-11-20

## 2017-11-20 NOTE — TELEPHONE ENCOUNTER
Pt left voice message at [phone]    Contacted pt, she received letter from Dr. Scott regarding lab results and starting Crestor.    Before she decides if she wants to start Crestor, she asks if Dr. Scott can reviewed medications for possible interactions with her other medications - she is specifically worried about leflunomide. She also states that another RN mentioned taking this until she was 74 - pt asks what the importance of 74 was. Sent to provider to review.

## 2017-11-21 NOTE — TELEPHONE ENCOUNTER
Reviewed potential interactions. Maximum recommended dose of Crestor (rosuvastatin) is 10 mg per day in patients taking leflunomide (Arava).   Not sure what the nurse meant about taking a medication until she was 74.     It is not urgent for her to begin taking Crestor.   If she would like to wait until we can discuss this at a future appointment, that would be probably be a good idea.     Please advise pt.

## 2017-11-21 NOTE — TELEPHONE ENCOUNTER
Pt informed of recommendations from Dr. Scott. Pt states she will continue with diet and exercise until next appt.

## 2017-12-01 ENCOUNTER — THERAPY VISIT (OUTPATIENT)
Dept: PHYSICAL THERAPY | Facility: CLINIC | Age: 72
End: 2017-12-01
Payer: MEDICARE

## 2017-12-01 DIAGNOSIS — M48.061 SPINAL STENOSIS OF LUMBAR REGION, UNSPECIFIED WHETHER NEUROGENIC CLAUDICATION PRESENT: Primary | ICD-10-CM

## 2017-12-01 PROCEDURE — G8978 MOBILITY CURRENT STATUS: HCPCS | Mod: GP | Performed by: PHYSICAL THERAPIST

## 2017-12-01 PROCEDURE — G8979 MOBILITY GOAL STATUS: HCPCS | Mod: GP | Performed by: PHYSICAL THERAPIST

## 2017-12-01 PROCEDURE — 97161 PT EVAL LOW COMPLEX 20 MIN: CPT | Mod: GP | Performed by: PHYSICAL THERAPIST

## 2017-12-01 PROCEDURE — 97110 THERAPEUTIC EXERCISES: CPT | Mod: GP | Performed by: PHYSICAL THERAPIST

## 2017-12-01 NOTE — PROGRESS NOTES
Emma Jenkins is a 72 year old female patient presenting to Physical Therapy with the following Primary Symptoms: Bilateral low back pain.and lateral bilat hips. She denies having related symptoms spreading to the legs currently. These pains are described as intermittent.   She denies having painful cough/sneeze, saddle anaesthesia, severe night pain, peripheral motor deficit, recent bowel/bladder change, recent vision change, ringing in the ears or pain with swallowing. Pain is rated 0-1/10 at rest, and 8/10 at worst at onset. History of symptoms: These pains began suddenly 11/25/17  as the result of waking with severe pain for 3-4 days, it then has been slowly improving since that time. no specific episode or injury.  Previous treatment has included Physical Therapy.   Since onset, these pains are improving : She consulted her MD on 11/27/17 to address these pain complaints. Hx of bad bilat sciatica with pains in both legs summer 2017 Current Symptoms are worsened by: getting up and down from low chair or floor, rising from chairs. Current Symptoms are relieved by avoiding bending, rest, sitting still.   Recent surgical procedure: none in spine.  Hx of hand and feet surg, hysterectomy, appendectomy.   General health as reported by patient is:  good.  Pertinent medical history: depression, RA.  She denies any significant current illness or recent hospital admissions. She denies any regonal implanted devices.  Current occupational status: RETIRED. Previous functional status:  fully functional prior to pain onset/injury.           HPI                    Objective:      Gait:    Gait Type:  Normal   Assistive Devices:  None      Flexibility/Screens:       Lower Extremity:  Decreased left lower extremity flexibility:Piriformis and Hip Flexors    Decreased right lower extremity flexibility:  Piriformis and Hip Flexors               Lumbar/SI Evaluation  ROM:    AROM Lumbar:   Flexion:          Fingers to toes  Ext:                     75%   Side Bend:        Left:  75%    Right:  75%  Rotation:           Left:     Right:   Side Glide:        Left:     Right:         Strength: fair PPT moblity, 1/5 TrAbd initiation and tonic holding  Lumbar Myotomes:    T12-L3 (Hip Flex):  Left: 5    Right: 5  L2-4 (Quads):  Left:  5    Right:  5  L4 (Ankle DF):  Left:  5    Right:  5  L5 (Great Toe Ext): Left: 5    Right: 5   S1 (Toe Raise):  Left: 5    Right: 5  Lumbar DTR's:    L4 (Quad):  Left:  3   Right:  3  S1 (Achilles):  Left:  3   Right:  3  Cord Signs:  not assessed    Lumbar Dermtomes:            L4 Left:  Normal-light touch       L4 Right:  Normal-light touch  L5 Left:  Normal-light touch     L5 Right:  Normal-light touch  S1 Left:  Normal-light touch     S1 Right:  Normal-light touch  Neural Tension/Mobility:      Left side:SLR or Slump  negative.   Right side:   Slump and SLR positive.        Spinal Segmental Conclusions:     Level: Hypo noted at L4, L3 and L5                                                   General     ROS    Assessment/Plan:      Patient is a 72 year old female with lumbar complaints.    Patient has the following significant findings with corresponding treatment plan.                Diagnosis 1:  LBP, stenosis    Pain -  hot/cold therapy, manual therapy, splint/taping/bracing/orthotics, self management, education, directional preference exercise and home program  Decreased ROM/flexibility - manual therapy and therapeutic exercise  Decreased joint mobility - manual therapy and therapeutic exercise  Decreased strength - therapeutic exercise and therapeutic activities  Decreased proprioception - neuro re-education and therapeutic activities  Impaired muscle performance - neuro re-education  Decreased function - therapeutic activities  Impaired posture - neuro re-education    Therapy Evaluation Codes:   1) History comprised of:   Personal factors that impact the plan of care:      None.    Comorbidity factors that  impact the plan of care are:      Depression and Rheumatoid arthritis.     Medications impacting care: Anti-inflammatory and enbrel, trazadone, leuflenomide.  2) Examination of Body Systems comprised of:   Body structures and functions that impact the plan of care:      Lumbar spine.   Activity limitations that impact the plan of care are:      Bending, Sitting, Standing and Walking.  3) Clinical presentation characteristics are:   Stable/Uncomplicated.  4) Decision-Making    Low complexity using standardized patient assessment instrument and/or measureable assessment of functional outcome.  Cumulative Therapy Evaluation is: Low complexity.    Previous and current functional limitations:  (See Goal Flow Sheet for this information)    Short term and Long term goals: (See Goal Flow Sheet for this information)     Communication ability:  Patient appears to be able to clearly communicate and understand verbal and written communication and follow directions correctly.  Treatment Explanation - The following has been discussed with the patient:   RX ordered/plan of care  Anticipated outcomes  Possible risks and side effects  This patient would benefit from PT intervention to resume normal activities.   Rehab potential is excellent.    Frequency:  1 X week, once daily  Duration:  for 6 weeks  Discharge Plan:  Achieve all LTG.  Independent in home treatment program.  Reach maximal therapeutic benefit.    Please refer to the daily flowsheet for treatment today, total treatment time and time spent performing 1:1 timed codes.

## 2017-12-01 NOTE — LETTER
DEPARTMENT OF HEALTH AND HUMAN SERVICES  [facility] FOR MEDICARE & MEDICAID SERVICES    PLAN/UPDATED PLAN OF PROGRESS FOR OUTPATIENT REHABILITATION    PATIENTS NAME:  Emma Jenkins   : 1945  PR[facility] NUMBER:    [id number]  [id number]  PROVIDER NAME: SUSI HANNON PT  MEDICAL RECORD NUMBER: [id number]   START OF CARE DATE: 17   TYPE:  PT  PRIMARY/TREATMENT DIAGNOSIS: Spinal stenosis of lumbar region, unspecified whether neurogenic claudication present    VISITS FROM START OF CARE:  Rxs Used: 1     Emma Jenkins is a 72 year old female patient presenting to Physical Therapy with the following Primary Symptoms: Bilateral low back pain.and lateral bilat hips. She denies having related symptoms spreading to the legs currently. These pains are described as intermittent.   She denies having painful cough/sneeze, saddle anaesthesia, severe night pain, peripheral motor deficit, recent bowel/bladder change, recent vision change, ringing in the ears or pain with swallowing. Pain is rated 0-1/10 at rest, and 8/10 at worst at onset. History of symptoms: These pains began suddenly 17  as the result of waking with severe pain for 3-4 days, it then has been slowly improving since that time. no specific episode or injury.  Previous treatment has included Physical Therapy.   Since onset, these pains are improving : She consulted her MD on 17 to address these pain complaints. Hx of bad bilat sciatica with pains in both legs summer 2017 Current Symptoms are worsened by: getting up and down from low chair or floor, rising from chairs. Current Symptoms are relieved by avoiding bending, rest, sitting still.   Recent surgical procedure: none in spine.  Hx of hand and feet surg, hysterectomy, appendectomy.   General health as reported by patient is:  good.  Pertinent medical history: depression, RA.  She denies any significant current illness or recent hospital admissions. She denies any regonal  implanted devices.  Current occupational status: RETIRED. Previous functional status:  fully functional prior to pain onset/injury.       Objective:  Gait:    Gait Type:  Normal   Assistive Devices:  None  Flexibility/Screens:   Lower Extremity:  Decreased left lower extremity flexibility:Piriformis and Hip Flexors  Decreased right lower extremity flexibility:  Piriformis and Hip Flexors       Lumbar/SI Evaluation  ROM:    AROM Lumbar:   Flexion:          Fingers to toes  Ext:                    75%   Side Bend:        Left:  75%    Right:  75%  Rotation:           Left:     Right:   Side Glide:        Left:     Right:   Strength: fair PPT moblity, 1/5 TrAbd initiation and tonic holding  PATIENTS NAME:  Emma Jenkins   : 1945      Lumbar Myotomes:    T12-L3 (Hip Flex):  Left: 5    Right: 5  L2-4 (Quads):  Left:  5    Right:  5  L4 (Ankle DF):  Left:  5    Right:  5  L5 (Great Toe Ext): Left: 5    Right: 5   S1 (Toe Raise):  Left: 5    Right: 5  Lumbar DTR's:    L4 (Quad):  Left:  3   Right:  3  S1 (Achilles):  Left:  3   Right:  3  Cord Signs:  not assessed  Lumbar Dermtomes:    L4 Left:  Normal-light touch       L4 Right:  Normal-light touch  L5 Left:  Normal-light touch     L5 Right:  Normal-light touch  S1 Left:  Normal-light touch     S1 Right:  Normal-light touch  Neural Tension/Mobility:    Left side:SLR or Slump  negative.   Right side:   Slump and SLR positive.  Spinal Segmental Conclusions:   Level: Hypo noted at L4, L3 and L5    Assessment/Plan:    Patient is a 72 year old female with lumbar complaints.    Patient has the following significant findings with corresponding treatment plan.                Diagnosis 1:  LBP, stenosis    Pain -  hot/cold therapy, manual therapy, splint/taping/bracing/orthotics, self management, education, directional preference exercise and home program  Decreased ROM/flexibility - manual therapy and therapeutic exercise  Decreased joint mobility - manual therapy and  therapeutic exercise  Decreased strength - therapeutic exercise and therapeutic activities  Decreased proprioception - neuro re-education and therapeutic activities  Impaired muscle performance - neuro re-education  Decreased function - therapeutic activities  Impaired posture - neuro re-education    Therapy Evaluation Codes:   1) History comprised of:   Personal factors that impact the plan of care:      None.    Comorbidity factors that impact the plan of care are:      Depression and Rheumatoid arthritis.     Medications impacting care: Anti-inflammatory and enbrel, trazadone, leuflenomide.  2) Examination of Body Systems comprised of:   Body structures and functions that impact the plan of care:      Lumbar spine.   Activity limitations that impact the plan of care are:      Bending, Sitting, Standing and Walking.      PATIENTS NAME:  Emma Jenkins   : 1945        3) Clinical presentation characteristics are:   Stable/Uncomplicated.      4) Decision-Making    Low complexity using standardized patient assessment instrument and/or measureable assessment of functional outcome.  Cumulative Therapy Evaluation is: Low complexity.    Previous and current functional limitations:  (See Goal Flow Sheet for this information)    Short term and Long term goals: (See Goal Flow Sheet for this information)     Communication ability:  Patient appears to be able to clearly communicate and understand verbal and written communication and follow directions correctly.  Treatment Explanation - The following has been discussed with the patient:   RX ordered/plan of care  Anticipated outcomes  Possible risks and side effects  This patient would benefit from PT intervention to resume normal activities.   Rehab potential is excellent.    Frequency:  1 X week, once daily  Duration:  for 6 weeks  Discharge Plan:  Achieve all LTG.  Independent in home treatment program.  Reach maximal therapeutic benefit.      Caregiver  "Signature/Credentials _____________________________ Date ________       Treating Provider: Paul Breyen PT     I have reviewed and certified the need for these services and plan of treatment while under my care.        PHYSICIAN'S SIGNATURE:   ___________________________________ Date___________     Chicho Jenkins MD    Certification period:  Beginning of Cert date period: 12/01/17 to  End of Cert period date: 02/28/18     Functional Level Progress Report: Please see attached \"Goal Flow sheet for Functional level.\"    ____X____ Continue Services or       ________ DC Services                Service dates: From  SOC Date: 12/01/17 date to present                         "

## 2017-12-08 ENCOUNTER — THERAPY VISIT (OUTPATIENT)
Dept: PHYSICAL THERAPY | Facility: CLINIC | Age: 72
End: 2017-12-08
Payer: MEDICARE

## 2017-12-08 DIAGNOSIS — M48.061 SPINAL STENOSIS OF LUMBAR REGION, UNSPECIFIED WHETHER NEUROGENIC CLAUDICATION PRESENT: ICD-10-CM

## 2017-12-08 PROCEDURE — 97110 THERAPEUTIC EXERCISES: CPT | Mod: GP | Performed by: PHYSICAL THERAPIST

## 2017-12-20 ENCOUNTER — THERAPY VISIT (OUTPATIENT)
Dept: PHYSICAL THERAPY | Facility: CLINIC | Age: 72
End: 2017-12-20
Payer: MEDICARE

## 2017-12-20 DIAGNOSIS — M48.061 SPINAL STENOSIS OF LUMBAR REGION, UNSPECIFIED WHETHER NEUROGENIC CLAUDICATION PRESENT: ICD-10-CM

## 2017-12-20 PROCEDURE — 97110 THERAPEUTIC EXERCISES: CPT | Mod: GP | Performed by: PHYSICAL THERAPIST

## 2017-12-20 PROCEDURE — 97530 THERAPEUTIC ACTIVITIES: CPT | Mod: GP | Performed by: PHYSICAL THERAPIST

## 2017-12-21 ENCOUNTER — TRANSFERRED RECORDS (OUTPATIENT)
Dept: HEALTH INFORMATION MANAGEMENT | Facility: CLINIC | Age: 72
End: 2017-12-21

## 2017-12-21 ENCOUNTER — TELEPHONE (OUTPATIENT)
Dept: INTERNAL MEDICINE | Facility: CLINIC | Age: 72
End: 2017-12-21

## 2017-12-21 DIAGNOSIS — R74.02 ELEVATED LDH: Primary | ICD-10-CM

## 2017-12-21 LAB
ALT SERPL-CCNC: 20 IU/L (ref 5–35)
AST SERPL-CCNC: 27 U/L (ref 5–34)
CREAT SERPL-MCNC: 0.68 MG/DL (ref 0.5–1.3)
GFR SERPL CREATININE-BSD FRML MDRD: 90.1 ML/MIN/1.73M2

## 2017-12-21 NOTE — TELEPHONE ENCOUNTER
Call received from pt stating that she spoke to her Rheumatologist about her concerns about taking Crestor. States Rheumatologist recommended she start taking it so pt intends to start today. Asking for recommendations for what time of day to take med, to take with or without food, etc. Per Crestor web site, pt advised may take med any time of day with or without food. Advised per MD letter to have lab rechecked in 2 months after starting med. Lab orders entered.

## 2017-12-26 ENCOUNTER — TELEPHONE (OUTPATIENT)
Dept: INTERNAL MEDICINE | Facility: CLINIC | Age: 72
End: 2017-12-26

## 2017-12-26 NOTE — TELEPHONE ENCOUNTER
Called pt, relayed MD message below. Pt reports that this AM when she spoke with the nurse she was feeling pretty well, but now she's definitely noting a continued tiredness and ache. States it's not as bad, but not good either.    Please advise, thanks.

## 2017-12-26 NOTE — TELEPHONE ENCOUNTER
Pt left voice message requesting a call back with a     Pt started Crestor last Friday and developed arm aching and fatigue after 2 days. Pt decreased dose to 10mg yesterday and today. She states aching has improved some and fatigue is now gone with decreased dose. Pt asks what she should do about medication.

## 2017-12-27 NOTE — TELEPHONE ENCOUNTER
Pt calls, she skipped Crestor dose today due to continued muscle aches. She asks what she should do next - schedule appt, try alternative medication (per pt, has not tried other statins) or if Dr. Scott has other recommendations.

## 2017-12-28 NOTE — TELEPHONE ENCOUNTER
She should stop taking Crestor until muscle aches have completely resolved.   We can discuss a different statin medication/dose after symptom resolution.     Please advise pt.

## 2018-01-05 ENCOUNTER — TELEPHONE (OUTPATIENT)
Dept: INTERNAL MEDICINE | Facility: CLINIC | Age: 73
End: 2018-01-05

## 2018-01-05 NOTE — TELEPHONE ENCOUNTER
Emma tried taking Rouvastatin, but it made her her arms hurt a lot and in general made her very fatigued.  She tried cutting them in half but she had the same sxs.  She lost 12 lbs, but she can't get below that. She went off of it and now the sxs are gone, so she's wondering what she should do next?    She uses [facility].

## 2018-01-05 NOTE — TELEPHONE ENCOUNTER
Need to avoid statins for now. We can consider another trial of a statin some time in the future, discussing at a future appt. Please advise pt.

## 2018-01-25 ENCOUNTER — THERAPY VISIT (OUTPATIENT)
Dept: PHYSICAL THERAPY | Facility: CLINIC | Age: 73
End: 2018-01-25
Payer: MEDICARE

## 2018-01-25 DIAGNOSIS — M48.061 SPINAL STENOSIS OF LUMBAR REGION, UNSPECIFIED WHETHER NEUROGENIC CLAUDICATION PRESENT: ICD-10-CM

## 2018-01-25 PROCEDURE — 97112 NEUROMUSCULAR REEDUCATION: CPT | Mod: GP | Performed by: PHYSICAL THERAPIST

## 2018-01-25 PROCEDURE — 97110 THERAPEUTIC EXERCISES: CPT | Mod: GP | Performed by: PHYSICAL THERAPIST

## 2018-02-12 ENCOUNTER — THERAPY VISIT (OUTPATIENT)
Dept: PHYSICAL THERAPY | Facility: CLINIC | Age: 73
End: 2018-02-12
Payer: MEDICARE

## 2018-02-12 DIAGNOSIS — M48.061 SPINAL STENOSIS OF LUMBAR REGION, UNSPECIFIED WHETHER NEUROGENIC CLAUDICATION PRESENT: ICD-10-CM

## 2018-02-12 PROCEDURE — 97112 NEUROMUSCULAR REEDUCATION: CPT | Mod: GP | Performed by: PHYSICAL THERAPIST

## 2018-02-12 PROCEDURE — G8978 MOBILITY CURRENT STATUS: HCPCS | Mod: GP | Performed by: PHYSICAL THERAPIST

## 2018-02-12 PROCEDURE — 97110 THERAPEUTIC EXERCISES: CPT | Mod: GP | Performed by: PHYSICAL THERAPIST

## 2018-02-12 PROCEDURE — G8980 MOBILITY D/C STATUS: HCPCS | Mod: GP | Performed by: PHYSICAL THERAPIST

## 2018-02-12 PROCEDURE — G8979 MOBILITY GOAL STATUS: HCPCS | Mod: GP | Performed by: PHYSICAL THERAPIST

## 2018-02-12 NOTE — PROGRESS NOTES
Subjective:  HPI  Oswestry Score: 14 %                 Objective:  System    Physical Exam    General     ROS    Assessment/Plan:    DISCHARGE REPORT    Progress reporting period is from 12/1/18 to 2/12/18.       SUBJECTIVE  Subjective changes noted by patient:  Pt reports she is 90% better overall. She feels she is getting stronger. She is only limited in lifting heavy objects but feels this is more due to her age and not her back related symptoms.     Current pain level is 0/10  .   Changes in function:  Yes (See Goal flowsheet attached for changes in current functional level)  Adverse reaction to treatment or activity: None    OBJECTIVE  Changes noted in objective findings:  Yes, : FIS to toes, EIS min restriction, decreased glut activation on L during prone leg lift, improved with cues. Kingsley decreased to low risk for longterm disability. JOSH 14%, pt reports she is improved from her first session.     ASSESSMENT/PLAN  Updated problem list and treatment plan: Diagnosis 1:  lumbago  Decreased ROM/flexibility - manual therapy and therapeutic exercise  Decreased strength - therapeutic exercise and therapeutic activities  Decreased function - therapeutic activities  STG/LTGs have been met or progress has been made towards goals:  Yes (See Goal flow sheet completed today.)  Assessment of Progress: The patient's condition is improving.  Self Management Plans:  Patient is independent in a home treatment program.    Emma continues to require the following intervention to meet STG and LTG's:  PT intervention is no longer required to meet STG/LTG.    Recommendations:  This patient is ready to be discharged from therapy and continue their home treatment program.    Please refer to the daily flowsheet for treatment today, total treatment time and time spent performing 1:1 timed codes.

## 2018-03-07 ENCOUNTER — TRANSFERRED RECORDS (OUTPATIENT)
Dept: HEALTH INFORMATION MANAGEMENT | Facility: CLINIC | Age: 73
End: 2018-03-07

## 2018-03-07 LAB
ALT SERPL-CCNC: 18 IU/L (ref 5–35)
AST SERPL-CCNC: 24 U/L (ref 5–34)
CREAT SERPL-MCNC: 0.62 MG/DL (ref 0.5–1.3)
GFR SERPL CREATININE-BSD FRML MDRD: 100.3 ML/MIN/1.73M2

## 2018-06-07 ENCOUNTER — TRANSFERRED RECORDS (OUTPATIENT)
Dept: HEALTH INFORMATION MANAGEMENT | Facility: CLINIC | Age: 73
End: 2018-06-07

## 2018-06-07 LAB
ALT SERPL-CCNC: 18 IU/L (ref 5–35)
AST SERPL-CCNC: 26 U/L (ref 5–34)
CREAT SERPL-MCNC: 0.72 MG/DL (ref 0.5–1.3)
GFR SERPL CREATININE-BSD FRML MDRD: 84.4 ML/MIN/1.73M2

## 2018-06-30 DIAGNOSIS — B00.9 RECURRENT HSV (HERPES SIMPLEX VIRUS): ICD-10-CM

## 2018-07-02 NOTE — TELEPHONE ENCOUNTER
"Requested Prescriptions   Pending Prescriptions Disp Refills     acyclovir (ZOVIRAX) 400 MG tablet [Pharmacy Med Name: ACYCLOVIR 400MG TABS]  Last Written Prescription Date:  7/18/2017  Last Fill Quantity: 15,  # refills: 5   Last office visit: 7/18/2017 with prescribing provider:     Future Office Visit:   Next 5 appointments (look out 90 days)     Jul 25, 2018 10:00 AM CDT   [facility]    [address]  [facility] [address]   [phone]                15 tablet 5     Sig: TAKE ONE TABLET BY MOUTH THREE TIMES A DAY    Antivirals for Herpes Protocol Passed    6/30/2018 11:52 AM       Passed - Patient is age 12 or older       Passed - Recent (12 mo) or future (30 days) visit within the authorizing provider's specialty    Patient had office visit in the last 12 months or has a visit in the next 30 days with authorizing provider or within the authorizing provider's specialty.  See \"Patient Info\" tab in inbasket, or \"Choose Columns\" in Meds & Orders section of the refill encounter.           Passed - Normal serum creatinine on file in past 12 months    Recent Labs   Lab Test 06/07/18   CR  0.720             "

## 2018-07-03 RX ORDER — ACYCLOVIR 400 MG/1
TABLET ORAL
Qty: 15 TABLET | Refills: 5 | Status: SHIPPED | OUTPATIENT
Start: 2018-07-03 | End: 2019-03-28

## 2018-07-09 ENCOUNTER — HOSPITAL ENCOUNTER (EMERGENCY)
Facility: CLINIC | Age: 73
Discharge: HOME | End: 2018-07-09
Attending: EMERGENCY MEDICINE | Admitting: EMERGENCY MEDICINE
Payer: MEDICARE

## 2018-07-09 ENCOUNTER — APPOINTMENT (OUTPATIENT)
Dept: GENERAL RADIOLOGY | Facility: CLINIC | Age: 73
End: 2018-07-09
Attending: EMERGENCY MEDICINE
Payer: MEDICARE

## 2018-07-09 VITALS
OXYGEN SATURATION: 95 % | SYSTOLIC BLOOD PRESSURE: 155 MMHG | DIASTOLIC BLOOD PRESSURE: 94 MMHG | RESPIRATION RATE: 18 BRPM | TEMPERATURE: 97.3 F

## 2018-07-09 DIAGNOSIS — M54.50 ACUTE BILATERAL LOW BACK PAIN WITHOUT SCIATICA: ICD-10-CM

## 2018-07-09 PROCEDURE — 25000132 ZZH RX MED GY IP 250 OP 250 PS 637: Mod: GY | Performed by: EMERGENCY MEDICINE

## 2018-07-09 PROCEDURE — 72100 X-RAY EXAM L-S SPINE 2/3 VWS: CPT

## 2018-07-09 PROCEDURE — 96375 TX/PRO/DX INJ NEW DRUG ADDON: CPT

## 2018-07-09 PROCEDURE — 99285 EMERGENCY DEPT VISIT HI MDM: CPT | Mod: 25

## 2018-07-09 PROCEDURE — 25000128 H RX IP 250 OP 636: Performed by: EMERGENCY MEDICINE

## 2018-07-09 PROCEDURE — A9270 NON-COVERED ITEM OR SERVICE: HCPCS | Mod: GY | Performed by: EMERGENCY MEDICINE

## 2018-07-09 PROCEDURE — 96374 THER/PROPH/DIAG INJ IV PUSH: CPT

## 2018-07-09 RX ORDER — MORPHINE SULFATE 4 MG/ML
4 INJECTION, SOLUTION INTRAMUSCULAR; INTRAVENOUS ONCE
Status: DISCONTINUED | OUTPATIENT
Start: 2018-07-09 | End: 2018-07-09 | Stop reason: CLARIF

## 2018-07-09 RX ORDER — HYDROCODONE BITARTRATE AND ACETAMINOPHEN 5; 325 MG/1; MG/1
1 TABLET ORAL EVERY 6 HOURS PRN
Qty: 12 TABLET | Refills: 0 | Status: SHIPPED | OUTPATIENT
Start: 2018-07-09 | End: 2018-07-12

## 2018-07-09 RX ORDER — LIDOCAINE 50 MG/G
1 PATCH TOPICAL EVERY 24 HOURS
Qty: 10 PATCH | Refills: 0 | Status: SHIPPED | OUTPATIENT
Start: 2018-07-09 | End: 2018-07-19

## 2018-07-09 RX ORDER — IBUPROFEN 600 MG/1
600 TABLET, FILM COATED ORAL EVERY 6 HOURS PRN
Qty: 40 TABLET | Refills: 0 | Status: SHIPPED | OUTPATIENT
Start: 2018-07-09 | End: 2018-07-19

## 2018-07-09 RX ORDER — CYCLOBENZAPRINE HCL 10 MG
10 TABLET ORAL 3 TIMES DAILY PRN
Qty: 15 TABLET | Refills: 0 | Status: SHIPPED | OUTPATIENT
Start: 2018-07-09 | End: 2018-08-13

## 2018-07-09 RX ORDER — KETOROLAC TROMETHAMINE 15 MG/ML
15 INJECTION, SOLUTION INTRAMUSCULAR; INTRAVENOUS ONCE
Status: COMPLETED | OUTPATIENT
Start: 2018-07-09 | End: 2018-07-09

## 2018-07-09 RX ORDER — CYCLOBENZAPRINE HCL 10 MG
10 TABLET ORAL ONCE
Status: COMPLETED | OUTPATIENT
Start: 2018-07-09 | End: 2018-07-09

## 2018-07-09 RX ORDER — MORPHINE SULFATE 4 MG/ML
4 INJECTION, SOLUTION INTRAMUSCULAR; INTRAVENOUS ONCE
Status: COMPLETED | OUTPATIENT
Start: 2018-07-09 | End: 2018-07-09

## 2018-07-09 RX ORDER — LIDOCAINE 4 G/G
1 PATCH TOPICAL ONCE
Status: DISCONTINUED | OUTPATIENT
Start: 2018-07-09 | End: 2018-07-09 | Stop reason: HOSPADM

## 2018-07-09 RX ORDER — HYDROCODONE BITARTRATE AND ACETAMINOPHEN 5; 325 MG/1; MG/1
1 TABLET ORAL ONCE
Status: COMPLETED | OUTPATIENT
Start: 2018-07-09 | End: 2018-07-09

## 2018-07-09 RX ADMIN — MORPHINE SULFATE 4 MG: 4 INJECTION INTRAVENOUS at 15:32

## 2018-07-09 RX ADMIN — LIDOCAINE 1 PATCH: 560 PATCH PERCUTANEOUS; TOPICAL; TRANSDERMAL at 15:32

## 2018-07-09 RX ADMIN — HYDROCODONE BITARTRATE AND ACETAMINOPHEN 1 TABLET: 5; 325 TABLET ORAL at 15:31

## 2018-07-09 RX ADMIN — CYCLOBENZAPRINE HYDROCHLORIDE 10 MG: 10 TABLET, FILM COATED ORAL at 15:31

## 2018-07-09 RX ADMIN — KETOROLAC TROMETHAMINE 15 MG: 15 INJECTION, SOLUTION INTRAMUSCULAR; INTRAVENOUS at 15:32

## 2018-07-09 ASSESSMENT — ENCOUNTER SYMPTOMS
NUMBNESS: 0
DIFFICULTY URINATING: 0
FEVER: 0
BACK PAIN: 1

## 2018-07-09 NOTE — ED NOTES
Patient educated on narcotic pain medicine, Norco, as well as medication instructions for Flexeril, Lidoderm patches, and Ibuprofen and follow-up with orthopedics. Educated to not drive or operate equipment while taking this medication. Patient educated that medication can make them drowsy or impaired. Educated that pain medications can cause addiction and that opioids can cause constipation, and to drink plenty of fluids and consume fiber. Patient received discharge instructions and has no other questions at this time.

## 2018-07-09 NOTE — DISCHARGE INSTRUCTIONS
Take Motrin and use LidoDerm and heating pads for pain.    If your pain is severe, you can also take Flexeril (muscle relaxer) and Norco (narcotic pain pill). These will make you drowsy so you cannot drive or operate heavy machinery after taking. Use with caution as Norco is also addictive. Norco will make you constipated so consider over the counter stool softeners.    Follow up with your spine orthopedist. Your pain will likely improve with time, but if not you may require a repeat MRI for further diagnosis and may need other treatments such as physical therapy or steroid injections.    Return to the ER with weakness of the leg(s), new/worsening numbness or tingling, difficulty urinating or urinary incontinence, fever >100.4F, or any other concerns.

## 2018-07-09 NOTE — ED AVS SNAPSHOT
[facility] Emergency Department    [address]    [facility] [address]    Phone:  [phone]    Fax:  [phone]                                       Emma Jenkins   MRN: [id number]    Department:  [facility] Emergency Department   Date of Visit:  7/9/2018           After Visit Summary Signature Page     I have received my discharge instructions, and my questions have been answered. I have discussed any challenges I see with this plan with the nurse or doctor.    ..........................................................................................................................................  Patient/Patient Representative Signature      ..........................................................................................................................................  Patient Representative Print Name and Relationship to Patient    ..................................................               ................................................  Date                                            Time    ..........................................................................................................................................  Reviewed by Signature/Title    ...................................................              ..............................................  Date                                                            Time

## 2018-07-09 NOTE — ED NOTES
"Patient presents with lower back pain since 10:00 this morning. Patient was diagnosed about a year ago with spinal stenosis and she \"tweaked\" her back this morning while reaching up. Patient unable to walk without difficulty. ABCDs intact, alert and oriented x 4.   "

## 2018-07-09 NOTE — ED PROVIDER NOTES
History     Chief Complaint:  Back Pain    The history is provided by the patient and the spouse.      Emma Jenkins is a 72 year old female with a prior diagnosis of a lumbar herniated disc who presents for evaluation of back pain. The patient states she had an episode of back pain 1 year ago that improved with PT (MRI from that ER visit below). She had been doing well until today when she was reaching high to clean a window and felt onset of back pain similar to prior episode. Patient states pain is severe, constant, and currently non-radiating. She describes it as bilateral lower back, but reports it seems worse on the right. Pain is exacerbated by movement. She notes no associated symptoms including numbness or tingling, difficulty urinating, incontinence, or fever. She has no history of malignancy.    MR LUMBAR SPINE WITHOUT CONTRAST June 20, 2017   1. Moderate degenerative disc disease from L2-L3 through L5-S1.  2. Apophyseal joint degenerative arthrosis and degenerative grade 1  spondylolisthesis at L3-L4 and L4-L5.  3. L5-S1 right lateral recess 0.7 cm disc extrusion or herniation  which appears to compress the right S1 nerve root.  4. Mild or borderline central stenosis at L3-L4.  5. Multilevel foraminal stenosis, greatest and moderate on the left at  L4-L5.    Allergies:  No Known Drug Allergies     Medications:    acyclovir (ZOVIRAX) 400 MG tablet  calcium carb 1250 mg, 500 mg [place],/vitamin D 200 units (OSCAL WITH D) 500-200 MG-UNIT per tablet  Cholecalciferol (VITAMIN D3 PO)  citalopram (CELEXA) 40 MG tablet  etanercept (ENBREL) 50 MG/ML injection  leflunomide (ARAVA) 10 MG tablet  metoprolol (LOPRESSOR) 50 MG tablet  multivitamin, therapeutic (THERA-VIT) TABS  Omega-3 Fatty Acids (OMEGA-3 FISH OIL PO)  omeprazole (PRILOSEC) 40 MG capsule  TRAZodone (DESYREL) 75 MG TABS    Past Medical History:    Arthritis  GERD  Lumbar herniated disc    Past Surgical History:     Appendectomy  Hysterectomy    Family History:    History reviewed. No pertinent family history.    Social History:  The patient was accompanied to the ED by her .  Smoking Status: Former Smoker  Smokeless Tobacco: Never Used  Alcohol Use: Rare   Marital Status:       Review of Systems   Constitutional: Negative for fever.   Genitourinary: Negative for difficulty urinating.        Negative incontinence   Musculoskeletal: Positive for back pain.   Neurological: Negative for weakness and numbness.   All other systems reviewed and are negative.    Physical Exam     Patient Vitals for the past 24 hrs:   BP Temp Temp src Heart Rate Resp SpO2   07/09/18 1722 - - - - - 95 %   07/09/18 1715 - - - - - 94 %   07/09/18 1545 (!) 155/94 - - - - 96 %   07/09/18 1448 176/90 97.3  F (36.3  C) Oral 62 18 97 %      Physical Exam  General: Well-developed and well-nourished. Uncomfortable appearing elderly  female. Cooperative.  Head:  Atraumatic.  Eyes:  Conjunctivae, lids, and sclerae are normal.  ENT:    Normal nose. Moist mucous membranes.  Neck:  Supple. Normal range of motion.  CV:  Regular rate and rhythm. Normal heart sounds with no murmurs, rubs, or gallops detected. 2+ DP bilaterally.  Resp:  No respiratory distress. Clear to auscultation bilaterally without decreased breath sounds, wheezing, rales, or rhonchi.  GI:  Soft. Non-distended. Non-tender.    MS:  Normal ROM. No bilateral lower extremity edema.  Skin:  Warm. Non-diaphoretic. No pallor.  Neuro:  Awake. A&Ox3. Normal strength including 5/5 strength with plantarflexion and dorsiflexion bilaterally. Sensation intact to light touch throughout including the perineum.  Psych:  Normal mood and affect. Normal speech.  Vitals reviewed.    Emergency Department Course     Imaging:  Radiology findings were communicated with the patient who voiced understanding of the findings.    Lumbar spine XR, 2-3 views  There are 5 lumbar type vertebrae. There is  "grade 1  anterolisthesis at L3-4, is unchanged. Posterior facet degenerative  changes in the mid and lower lumbar spine. No evidence of fracture.  Reading per radiology    Interventions:  1531 Norco 1 tab PO  1531 Flexeril 10 mg PO  1532 Lidocaine 4% patch x1 transdermally  1532 Morphine 4 mg IV  1532 Toradol 15 mg IV    Emergency Department Course:    1445 Nursing notes and vitals reviewed.    1455 I performed an exam of the patient as documented above.     1605 The patient was sent for a XR while in the emergency department, results above.      1710 Patient rechecked. She is feeling much better. Ambulated without difficulty.    1725 I personally reviewed the imaging results with the patient and answered all related questions prior to discharge.    Impression & Plan      Medical Decision Making:  Emma is a 72 year old woman with known degenerative disc disease and herniation of L5-S1 disc as per MRI 1 year ago (see above) and states that she was reaching high to clean a window ledge and \"tweaked\" her back resulting in acute onset of recurrent pain similar to one year ago.  She has no sciatic pain with radiation down her legs. She has no history of malignancy, no fever, and no other red flag symptoms.  She has no incontinence or urinary retention and her exam is unremarkable with normal strength and sensation throughout including her perineum. No evidence of cauda equina syndrome. I did repeat patient's lumbar x-ray to rule out fracture or bony lesions and fortunately neither of these or other acute pathologies are present with degenerative changes only.  Further imaging is unlikely to change her management as this appears to be acute on chronic back pain of known disc disease.  Patient was given morphine, Norco, Toradol, Flexeril, and Lidoderm patch was placed.  She had marked improvement in her symptoms and was able to ambulate quite well in the emergency department after this pain improvement.  I had a long " discussion with the patient and her  that she may require a repeat MRI when she follows up with spine surgery though at this point supportive care is best course of action.  Patient understands the use of Motrin, Lidoderm, Flexeril, and Norco as needed for her pain.  She agrees to follow-up with her orthopedist and notes that physical therapy has helped in the past.  I recommended she discuss this treatment option or others with her orthopedist.  I have provided strict return precautions in the interim including those for cauda equina syndrome.  After all of the patient's questions were answered, she verbalized understanding and is amenable to discharge.    Diagnosis:    ICD-10-CM    1. Acute bilateral low back pain without sciatica M54.5      Disposition:   Discharge    Discharge Medications:  Discharge Medication List as of 7/9/2018  5:26 PM      START taking these medications    Details   cyclobenzaprine (FLEXERIL) 10 MG tablet Take 1 tablet (10 mg) by mouth 3 times daily as needed for muscle spasms, Disp-15 tablet, R-0, Local Print      HYDROcodone-acetaminophen (NORCO) 5-325 MG per tablet Take 1 tablet by mouth every 6 hours as needed for pain, Disp-12 tablet, R-0, Local Print      ibuprofen (ADVIL/MOTRIN) 600 MG tablet Take 1 tablet (600 mg) by mouth every 6 hours as needed for moderate pain, Disp-40 tablet, R-0, Local Print      lidocaine (LIDODERM) 5 % Patch Place 1 patch onto the skin every 24 hours for 10 daysDisp-10 patch, R-0Local Print           Scribe Disclosure:  I, George Sawyer, am serving as a scribe at 3:02 PM on 7/9/2018 to document services personally performed by Nohemi Nevarez MD based on my observations and the provider's statements to me.      [facility] EMERGENCY DEPARTMENT       Nohemi Nevarez MD  07/10/18 [id number]

## 2018-07-09 NOTE — ED AVS SNAPSHOT
[facility]    [address]    [facility] [address]    Phone:  [phone]    Fax:  [phone]                                       Emma [place]   MRN: [id number]    Department:  [facility]   Date of Visit:  7/9/2018           Patient Information     Date Of Birth          1945        Your diagnoses for this visit were:     Acute bilateral low back pain without sciatica        You were seen by Nohemi Nevarez MD.      Follow-up Information     Follow up with [facility] In 2 days.    Contact information:    [address], [facility] [address]  [facility] [address]  [phone]          Follow up with Gene Scott MD.    Specialty:  Internal Medicine    Why:  As needed    Contact information:    [address]  [facility] [address]  [phone]          Follow up with [facility].    Specialty:  EMERGENCY MEDICINE    Why:  If symptoms worsen    Contact information:    [address]  [facility] [id number] [phone]        Discharge Instructions       Take Motrin and use LidoDerm and heating pads for pain.    If your pain is severe, you can also take Flexeril (muscle relaxer) and Norco (narcotic pain pill). These will make you drowsy so you cannot drive or operate heavy machinery after taking. Use with caution as Norco is also addictive. Norco will make you constipated so consider over the counter stool softeners.    Follow up with your spine orthopedist. Your pain will likely improve with time, but if not you may require a repeat MRI for further diagnosis and may need other treatments such as physical therapy or steroid injections.    Return to the ER with weakness of the leg(s), new/worsening numbness or tingling, difficulty urinating or urinary incontinence, fever >100.4F, or any other concerns.       Discharge References/Attachments      "BACK PAIN (LOW) OR LEG PAIN: POSSIBLE CAUSES (ENGLISH)      Your next 10 appointments already scheduled     Jul 25, 2018  9:00 AM CDT   MA SCREENING BILATERAL W/ MAO with [id number]   [facility] ([facility])    [address], [address]  [facility] [address]   [phone]           Three-dimensional (3D) mammograms are available at [facility] locations in [place], [place], [place], [place], [facility], [facility], and [place]. -Health locations include [place] and [facility] in [place]. Benefits of 3D mammograms include: - Improved rate of cancer detection - Decreases your chance of having to go back for more tests, which means fewer: - \"False-positive\" results (This means that there is an abnormal area but it isn't cancer.) - Invasive testing procedures, such as a biopsy or surgery - Can provide clearer images of the breast if you have dense breast tissue. 3D mammography is an optional exam that anyone can have with a 2D mammogram. It doesn't replace or take the place of a 2D mammogram. 2D mammograms remain an effective screening test for all women.  Not all insurance companies cover the cost of a 3D mammogram. Check with your insurance.            Jul 25, 2018 10:00 AM CDT   [facility]   [facility]    [address]  [facility] [address]   [phone]              24 Hour Appointment Hotline       To make an appointment at any [facility], call [phone] ([phone]). If you don't have a family doctor or clinic, we will help you find one. [facility] are conveniently located to serve the needs of you and your family.             Review of your medicines      START taking        Dose / Directions Last dose taken    cyclobenzaprine 10 MG tablet   Commonly known as:  FLEXERIL   Dose:  10 mg   Quantity:  15 tablet        Take 1 tablet (10 mg) by mouth " 3 times daily as needed for muscle spasms   Refills:  0        HYDROcodone-acetaminophen 5-325 MG per tablet   Commonly known as:  NORCO   Dose:  1 tablet   Quantity:  12 tablet        Take 1 tablet by mouth every 6 hours as needed for pain   Refills:  0        ibuprofen 600 MG tablet   Commonly known as:  ADVIL/MOTRIN   Dose:  600 mg   Quantity:  40 tablet        Take 1 tablet (600 mg) by mouth every 6 hours as needed for moderate pain   Refills:  0        lidocaine 5 % Patch   Commonly known as:  LIDODERM   Dose:  1 patch   Quantity:  10 patch        Place 1 patch onto the skin every 24 hours for 10 days   Refills:  0          Our records show that you are taking the medicines listed below. If these are incorrect, please call your family doctor or clinic.        Dose / Directions Last dose taken    acyclovir 400 MG tablet   Commonly known as:  ZOVIRAX   Quantity:  15 tablet        TAKE ONE TABLET BY MOUTH THREE TIMES A DAY   Refills:  5        Calcium carb-Vitamin D 500 mg [place]-200 units 500-200 MG-UNIT per tablet   Commonly known as:  OSCAL with D;Oyster Shell Calcium   Dose:  1 tablet        Take 1 tablet by mouth every morning   Refills:  0        citalopram 40 MG tablet   Commonly known as:  celeXA   Dose:  20 mg        Take 20 mg by mouth daily   Refills:  0        etanercept 50 MG/ML injection   Commonly known as:  ENBREL   Dose:  50 mg        Inject 50 mg Subcutaneous once a week On Saturdays   Refills:  0        leflunomide 10 MG tablet   Commonly known as:  ARAVA   Dose:  20 mg        Take 20 mg by mouth every other day   Refills:  0        metoprolol tartrate 50 MG tablet   Commonly known as:  LOPRESSOR   Quantity:  135 tablet        25mg q am, 50mg q hs   Refills:  3        multivitamin, therapeutic Tabs tablet   Dose:  1 tablet        Take 1 tablet by mouth daily   Refills:  0        OMEGA-3 FISH OIL PO   Dose:  1 g        Take 1 g by mouth daily   Refills:  0        omeprazole 40 MG capsule   Commonly  known as:  priLOSEC   Dose:  40 mg   Quantity:  90 capsule        Take 1 capsule (40 mg) by mouth daily Take 30-60 minutes before a meal.   Refills:  3        traZODone 75 mg Tabs half-tab   Commonly known as:  DESYREL   Dose:  50 mg        Take 50 mg by mouth At Bedtime   Refills:  0        VITAMIN D3 PO   Dose:  2500 Units        Take 2,500 Units by mouth daily   Refills:  0                Information about OPIOIDS     PRESCRIPTION OPIOIDS: WHAT YOU NEED TO KNOW   We gave you an opioid (narcotic) pain medicine. It is important to manage your pain, but opioids are not always the best choice. You should first try all the other options your care team gave you. Take this medicine for as short a time (and as few doses) as possible.     These medicines have risks:    DO NOT drive when on new or higher doses of pain medicine. These medicines can affect your alertness and reaction times, and you could be arrested for driving under the influence (DUI). If you need to use opioids long-term, talk to your care team about driving.    DO NOT operate heave machinery    DO NOT do any other dangerous activities while taking these medicines.     DO NOT drink any alcohol while taking these medicines.      If the opioid prescribed includes acetaminophen, DO NOT take with any other medicines that contain acetaminophen. Read all labels carefully. Look for the word  acetaminophen  or  Tylenol.  Ask your pharmacist if you have questions or are unsure.    You can get addicted to pain medicines, especially if you have a history of addiction (chemical, alcohol or substance dependence). Talk to your care team about ways to reduce this risk.    Store your pills in a secure place, locked if possible. We will not replace any lost or stolen medicine. If you don t finish your medicine, please throw away (dispose) as directed by your pharmacist. The [place] Pollution Control Agency has more information about safe disposal:  https://www.pca.[facility].mn.us/living-green/managing-unwanted-medications.     All opioids tend to cause constipation. Drink plenty of water and eat foods that have a lot of fiber, such as fruits, vegetables, prune juice, apple juice and high-fiber cereal. Take a laxative (Miralax, milk of magnesia, Colace, Senna) if you don t move your bowels at least every other day.         Prescriptions were sent or printed at these locations (4 Prescriptions)                   Other Prescriptions                Printed at Department/Unit printer (4 of 4)         cyclobenzaprine (FLEXERIL) 10 MG tablet               HYDROcodone-acetaminophen (NORCO) 5-325 MG per tablet               ibuprofen (ADVIL/MOTRIN) 600 MG tablet               lidocaine (LIDODERM) 5 % Patch                Procedures and tests performed during your visit     Lumbar spine XR, 2-3 views      Orders Needing Specimen Collection     None      Pending Results     No orders found from 7/7/2018 to 7/10/2018.            Pending Culture Results     No orders found from 7/7/2018 to 7/10/2018.            Pending Results Instructions     If you had any lab results that were not finalized at the time of your Discharge, you can call the ED Lab Result RN at [phone]. You will be contacted by this team for any positive Lab results or changes in treatment. The nurses are available 7 days a week from 10A to 6:30P.  You can leave a message 24 hours per day and they will return your call.        Test Results From Your Hospital Stay        7/9/2018  4:40 PM      Narrative     XR LUMBAR SPINE 2-3 VIEWS 7/9/2018 4:14 PM     HISTORY: recurrent low back pain, known spinal stenosis, eval for  acute fx, bony lesions, etc;     COMPARISON: Lumbar spine MRI from 6/20/2017        Impression     IMPRESSION: There are 5 lumbar type vertebrae. There is grade 1  anterolisthesis at L3-4, is unchanged. Posterior facet degenerative  changes in the mid and lower lumbar spine. No evidence of  fracture.    CLARENCE MENDOZA MD                Clinical Quality Measure: Blood Pressure Screening     Your blood pressure was checked while you were in the emergency department today. The last reading we obtained was  BP: (!) 155/94 . Please read the guidelines below about what these numbers mean and what you should do about them.  If your systolic blood pressure (the top number) is less than 120 and your diastolic blood pressure (the bottom number) is less than 80, then your blood pressure is normal. There is nothing more that you need to do about it.  If your systolic blood pressure (the top number) is 120-139 or your diastolic blood pressure (the bottom number) is 80-89, your blood pressure may be higher than it should be. You should have your blood pressure rechecked within a year by a primary care provider.  If your systolic blood pressure (the top number) is 140 or greater or your diastolic blood pressure (the bottom number) is 90 or greater, you may have high blood pressure. High blood pressure is treatable, but if left untreated over time it can put you at risk for heart attack, stroke, or kidney failure. You should have your blood pressure rechecked by a primary care provider within the next 4 weeks.  If your provider in the emergency department today gave you specific instructions to follow-up with your doctor or provider even sooner than that, you should follow that instruction and not wait for up to 4 weeks for your follow-up visit.        Thank you for choosing [facility]       Thank you for choosing [facility] for your care. Our goal is always to provide you with excellent care. Hearing back from our patients is one way we can continue to improve our services. Please take a few minutes to complete the written survey that you may receive in the mail after you visit with us. Thank you!        [employer] Information     [employer] lets you send messages to your doctor, view your test results, renew your  "prescriptions, schedule appointments and more. To sign up, go to [email or web address] . Click on \"Log in\" on the left side of the screen, which will take you to the Welcome page. Then click on \"Sign up Now\" on the right side of the page.     You will be asked to enter the access code listed below, as well as some personal information. Please follow the directions to create your username and password.     Your access code is: [email or web address]  Expires: 10/7/2018  5:26 PM     Your access code will  in 90 days. If you need help or a new code, please call your [place] clinic or [phone].        Care EveryWhere ID     This is your Care EveryWhere ID. This could be used by other organizations to access your [facility] medical records  [id number]        Equal Access to Services     LORI CABAN : Doug Easley, olvin maciel, carolyn brownlee, raúl grant . So [facility] [phone].    ATENCIÓN: Si habla español, tiene a monzon disposición servicios gratuitos de asistencia lingüística. Llame al [phone].    We comply with applicable federal civil rights laws and [place] laws. We do not discriminate on the basis of race, color, national origin, age, disability, sex, sexual orientation, or gender identity.            After Visit Summary       This is your record. Keep this with you and show to your community pharmacist(s) and doctor(s) at your next visit.                  "

## 2018-07-10 ASSESSMENT — ENCOUNTER SYMPTOMS: WEAKNESS: 0

## 2018-07-25 ENCOUNTER — OFFICE VISIT (OUTPATIENT)
Dept: INTERNAL MEDICINE | Facility: CLINIC | Age: 73
End: 2018-07-25
Payer: COMMERCIAL

## 2018-07-25 VITALS
TEMPERATURE: 98.4 F | BODY MASS INDEX: 23.32 KG/M2 | OXYGEN SATURATION: 96 % | HEART RATE: 77 BPM | WEIGHT: 140 LBS | DIASTOLIC BLOOD PRESSURE: 82 MMHG | SYSTOLIC BLOOD PRESSURE: 139 MMHG | HEIGHT: 65 IN | RESPIRATION RATE: 14 BRPM

## 2018-07-25 DIAGNOSIS — Z23 NEED FOR PNEUMOCOCCAL VACCINE: ICD-10-CM

## 2018-07-25 DIAGNOSIS — F33.42 MAJOR DEPRESSIVE DISORDER, RECURRENT EPISODE, IN FULL REMISSION (H): ICD-10-CM

## 2018-07-25 DIAGNOSIS — N95.1 MENOPAUSAL STATE: ICD-10-CM

## 2018-07-25 DIAGNOSIS — M05.79 SEROPOSITIVE RHEUMATOID ARTHRITIS OF MULTIPLE SITES (H): ICD-10-CM

## 2018-07-25 DIAGNOSIS — D36.9 ADENOMATOUS POLYP: ICD-10-CM

## 2018-07-25 DIAGNOSIS — L65.9 ALOPECIA: ICD-10-CM

## 2018-07-25 DIAGNOSIS — Z00.00 MEDICARE ANNUAL WELLNESS VISIT, SUBSEQUENT: Primary | ICD-10-CM

## 2018-07-25 DIAGNOSIS — E78.5 HYPERLIPIDEMIA LDL GOAL <130: ICD-10-CM

## 2018-07-25 LAB
ALBUMIN SERPL-MCNC: 3.6 G/DL (ref 3.4–5)
ALP SERPL-CCNC: 102 U/L (ref 40–150)
ALT SERPL W P-5'-P-CCNC: 27 U/L (ref 0–50)
ANION GAP SERPL CALCULATED.3IONS-SCNC: 5 MMOL/L (ref 3–14)
AST SERPL W P-5'-P-CCNC: 28 U/L (ref 0–45)
BASOPHILS # BLD AUTO: 0 10E9/L (ref 0–0.2)
BASOPHILS NFR BLD AUTO: 0.6 %
BILIRUB SERPL-MCNC: 0.4 MG/DL (ref 0.2–1.3)
BUN SERPL-MCNC: 11 MG/DL (ref 7–30)
CALCIUM SERPL-MCNC: 8.9 MG/DL (ref 8.5–10.1)
CHLORIDE SERPL-SCNC: 102 MMOL/L (ref 94–109)
CHOLEST SERPL-MCNC: 218 MG/DL
CO2 SERPL-SCNC: 27 MMOL/L (ref 20–32)
CREAT SERPL-MCNC: 0.7 MG/DL (ref 0.52–1.04)
DIFFERENTIAL METHOD BLD: NORMAL
EOSINOPHIL # BLD AUTO: 0.3 10E9/L (ref 0–0.7)
EOSINOPHIL NFR BLD AUTO: 5 %
ERYTHROCYTE [DISTWIDTH] IN BLOOD BY AUTOMATED COUNT: 13.3 % (ref 10–15)
GFR SERPL CREATININE-BSD FRML MDRD: 81 ML/MIN/1.7M2
GLUCOSE SERPL-MCNC: 88 MG/DL (ref 70–99)
HCT VFR BLD AUTO: 39.9 % (ref 35–47)
HDLC SERPL-MCNC: 47 MG/DL
HGB BLD-MCNC: 13 G/DL (ref 11.7–15.7)
LDLC SERPL CALC-MCNC: 153 MG/DL
LYMPHOCYTES # BLD AUTO: 1.3 10E9/L (ref 0.8–5.3)
LYMPHOCYTES NFR BLD AUTO: 24.7 %
MCH RBC QN AUTO: 29 PG (ref 26.5–33)
MCHC RBC AUTO-ENTMCNC: 32.6 G/DL (ref 31.5–36.5)
MCV RBC AUTO: 89 FL (ref 78–100)
MONOCYTES # BLD AUTO: 0.5 10E9/L (ref 0–1.3)
MONOCYTES NFR BLD AUTO: 9.9 %
NEUTROPHILS # BLD AUTO: 3.2 10E9/L (ref 1.6–8.3)
NEUTROPHILS NFR BLD AUTO: 59.8 %
NONHDLC SERPL-MCNC: 171 MG/DL
PLATELET # BLD AUTO: 243 10E9/L (ref 150–450)
POTASSIUM SERPL-SCNC: 4.4 MMOL/L (ref 3.4–5.3)
PROT SERPL-MCNC: 7 G/DL (ref 6.8–8.8)
RBC # BLD AUTO: 4.49 10E12/L (ref 3.8–5.2)
SODIUM SERPL-SCNC: 134 MMOL/L (ref 133–144)
TRIGL SERPL-MCNC: 88 MG/DL
TSH SERPL DL<=0.005 MIU/L-ACNC: 2.69 MU/L (ref 0.4–4)
WBC # BLD AUTO: 5.4 10E9/L (ref 4–11)

## 2018-07-25 PROCEDURE — 80053 COMPREHEN METABOLIC PANEL: CPT | Performed by: INTERNAL MEDICINE

## 2018-07-25 PROCEDURE — 36415 COLL VENOUS BLD VENIPUNCTURE: CPT | Performed by: INTERNAL MEDICINE

## 2018-07-25 PROCEDURE — 84443 ASSAY THYROID STIM HORMONE: CPT | Performed by: INTERNAL MEDICINE

## 2018-07-25 PROCEDURE — 99397 PER PM REEVAL EST PAT 65+ YR: CPT | Mod: 25 | Performed by: INTERNAL MEDICINE

## 2018-07-25 PROCEDURE — 80061 LIPID PANEL: CPT | Performed by: INTERNAL MEDICINE

## 2018-07-25 PROCEDURE — 90732 PPSV23 VACC 2 YRS+ SUBQ/IM: CPT | Performed by: INTERNAL MEDICINE

## 2018-07-25 PROCEDURE — G0009 ADMIN PNEUMOCOCCAL VACCINE: HCPCS | Performed by: INTERNAL MEDICINE

## 2018-07-25 PROCEDURE — 85025 COMPLETE CBC W/AUTO DIFF WBC: CPT | Performed by: INTERNAL MEDICINE

## 2018-07-25 ASSESSMENT — ACTIVITIES OF DAILY LIVING (ADL)
CURRENT_FUNCTION: NO ASSISTANCE NEEDED
I_NEED_ASSISTANCE_FOR_THE_FOLLOWING_DAILY_ACTIVITIES:: NO ASSISTANCE IS NEEDED

## 2018-07-25 NOTE — PATIENT INSTRUCTIONS
Preventive Health Recommendations    Female Ages 65 +    Yearly exam:     See your health care provider every year in order to  o Review health changes.   o Discuss preventive care.    o Review your medicines if your doctor has prescribed any.      You no longer need a yearly Pap test unless you've had an abnormal Pap test in the past 10 years. If you have vaginal symptoms, such as bleeding or discharge, be sure to talk with your provider about a Pap test.      Every 1 to 2 years, have a mammogram.  If you are over 69, talk with your health care provider about whether or not you want to continue having screening mammograms.      Every 10 years, have a colonoscopy. Or, have a yearly FIT test (stool test). These exams will check for colon cancer.       Have a cholesterol test every 5 years, or more often if your doctor advises it.       Have a diabetes test (fasting glucose) every three years. If you are at risk for diabetes, you should have this test more often.       At age 65, have a bone density scan (DEXA) to check for osteoporosis (brittle bone disease).    Shots:    Get a flu shot each year.    Get a tetanus shot every 10 years.    Talk to your doctor about your pneumonia vaccines. There are now two you should receive - Pneumovax (PPSV 23) and Prevnar (PCV 13).    Talk to your pharmacist about the shingles vaccine.    Talk to your doctor about the hepatitis B vaccine.    Nutrition:     Eat at least 5 servings of fruits and vegetables each day.      Eat whole-grain bread, whole-wheat pasta and brown rice instead of white grains and rice.      Get adequate Calcium and Vitamin D.     Lifestyle    Exercise at least 150 minutes a week (30 minutes a day, 5 days a week). This will help you control your weight and prevent disease.      Limit alcohol to one drink per day.      No smoking.       Wear sunscreen to prevent skin cancer.       See your dentist twice a year for an exam and cleaning.      See your eye doctor  every 1 to 2 years to screen for conditions such as glaucoma, macular degeneration and cataracts.      Check your insurance to see if they cover the new shingles vaccination (series of two shots).     We can update your second of two pneumonia vaccinations today.     Someone will call you to help schedule a colonoscopy and a bone density test.     Refills of medications have been faxed to your pharmacy.     Stop by the lab again to add a thyroid blood test.   I'll get back to you with lab results soon, especially if there is anything of concern.            Blood pressure high today. Check some blood pressures at                home (maybe once every couple of weeks), then see me back in         about two months (bring your home cuff along).

## 2018-07-25 NOTE — PROGRESS NOTES
SUBJECTIVE:   Emma Jenkins is a 72 year old female who presents for Preventive Visit.    Are you in the first 12 months of your Medicare Part B coverage?  No    Healthy Habits:  Answers for HPI/ROS submitted by the patient on 7/25/2018   Annual Exam:  Getting at least 3 servings of Calcium per day:: NO  Bi-annual eye exam:: Yes  Dental care twice a year:: Yes  Sleep apnea or symptoms of sleep apnea:: None  Diet:: Regular (no restrictions)  Frequency of exercise:: 4-5 days/week  Taking medications regularly:: Yes  Medication side effects:: None  Additional concerns today:: YES  Activities of Daily Living: no assistance needed  Home safety: lack of grab bars in the bathroom  Hearing Impairment:: difficulty following a conversation in a noisy restaurant or crowded room, difficulty understanding soft or whispered speech  PHQ-2 Score: 0  Duration of exercise:: 15-30 minutes    COGNITIVE SCREEN  1) Repeat 3 items (Leader, Season, Table)    2) Clock draw: NORMAL  3) 3 item recall: Recalls 3 objects  Results: 3 items recalled: COGNITIVE IMPAIRMENT LESS LIKELY    Mini-CogTM Copyright [employer]. Licensed by the author for use in [employer] reprinted with permission ([email or web address]@[email or web address]). All rights reserved.      Depression and anxiety  Currently on celexa 20 mg daily and trazodone 50 mg nightly. Sees mental health every 6 months.     Elevated BP  BP elevated today, 139/82. Currently on metoprolol for control of palpitations. She states that she has never had an issue with her BP.     Spinal stenosis  First had an episode of severe back pain last year and was seen at ED.  The back pain radiated down both legs. Lumbar spine MRI from 6/20/2017 indicated DDD and herniation of L5-S1.     Second episode of back pain occurred this year, she presented to ED on 7/9/2018 due to the low back pain which was alleviated with pain meds (morphine, norco, toradol, flexeril and lidoderm). Notes that pain radiated down the right  leg only. She is doing PT exercises at home.     Colon cancer screening  Last colonoscopy was 6/4/2013--2 polyps resected. Due for repeat colonoscopy.     Diet and exercise  Walks for an hour 4x a week.     Rheumatoid Arthritis   Sees Dr. Torres of rheumatology every 6 months. Notes that arthritis is mostly present in her hands and feet. She is currently on leflunomide 20 mg every other day and enbrel 50 mg once weekly. Last DEXA scan was 7/8/2015--normal.     GERD  Symptoms controlled with omeprazole. Will experience heartburn if she does not take it.     Urine leakage  Wears a pad for slight urine leakage.     Psoriasis  Recently seen by Dr. Romero of dermatology and has been using the medications prescribed by him.     Hair loss  She reports a balding spot on the top of her head which really distresses her. Last TSH levels on 7/18/2017 were normal, 2.72 mU/L.     Hyperlipidemia    Recent Labs   Lab Test  10/26/17   0958  07/18/17   1029   CHOL  251*  279*   HDL  51  51   LDL  177*  207*   TRIG  117  105     She has tried taking crestor 9/2017, but discontinued it due to terrible muscle aches. Notes that since then she has lost another 10 lbs since last LDL.     Past/recent records reviewed and discussed for:  -medications, family hx, social hx, immunizations    Reviewed and updated as needed this visit by clinical staff  Tobacco  Allergies  Meds  Med Hx  Surg Hx  Fam Hx  Soc Hx        Reviewed and updated as needed this visit by Provider        Social History   Substance Use Topics     Smoking status: Former Smoker     Years: 10.00     Smokeless tobacco: Never Used     Alcohol use 2.5 oz/week     5 Glasses of wine per week      Comment: rare     If you drink alcohol do you typically have >3 drinks per day or >7 drinks per week? No                        Today's PHQ-2 Score:   PHQ-2 ( 1999 [employer]) 7/25/2018 7/18/2017   Q1: Little interest or pleasure in doing things 0 0   Q2: Feeling down, depressed or  hopeless 0 0   PHQ-2 Score 0 0   Q1: Little interest or pleasure in doing things Not at all -   Q2: Feeling down, depressed or hopeless Not at all -   PHQ-2 Score 0 -     Do you feel safe in your environment - Yes    Do you have a Health Care Directive?: No: Advance care planning reviewed with patient; information given to patient to review.    Current providers sharing in care for this patient include:   Patient Care Team:  Gene Scott MD as PCP - General (Internal Medicine-Hematology & Oncology)  Karsten Malone MD as MD (Internal Medicine)    The following health maintenance items are reviewed in Epic and correct as of today:  Health Maintenance   Topic Date Due     ADVANCE DIRECTIVE PLANNING Q5 YRS  11/06/1999     PNEUMOCOCCAL (2 of 2 - PPSV23) 07/08/2016     PHQ-9 Q6 MONTHS  01/18/2018     FALL RISK ASSESSMENT  07/18/2018     INFLUENZA VACCINE (1) 09/01/2018     MAMMO SCREEN Q2 YR (SYSTEM ASSIGNED)  08/10/2019     LIPID SCREEN Q5 YR FEMALE (SYSTEM ASSIGNED)  10/26/2022     COLON CANCER SCREEN (SYSTEM ASSIGNED)  06/04/2023     TETANUS IMMUNIZATION (SYSTEM ASSIGNED)  10/17/2026     DEXA SCAN SCREENING (SYSTEM ASSIGNED)  Completed     ROS:  CONSTITUTIONAL: NEGATIVE for fever, chills, change in weight  INTEGUMENTARY/SKIN: NEGATIVE for worrisome rashes, moles or lesions  EYES: NEGATIVE for vision changes or irritation  ENT/MOUTH: NEGATIVE for ear, mouth and throat problems  RESP: NEGATIVE for significant cough or SOB  BREAST: NEGATIVE for masses, tenderness or discharge  CV: NEGATIVE for chest pain, palpitations or peripheral edema  GI: NEGATIVE for nausea, abdominal pain, heartburn, or change in bowel habits  : NEGATIVE for frequency, dysuria, or hematuria  MUSCULOSKELETAL: NEGATIVE for significant arthralgias or myalgia  NEURO: NEGATIVE for weakness, dizziness or paresthesias  ENDOCRINE: NEGATIVE for temperature intolerance, skin/hair changes  HEME: NEGATIVE for bleeding problems  PSYCHIATRIC: NEGATIVE for  "changes in mood or affect    This document serves as a record of the services and decisions personally performed and made by Gene Scott MD. It was created on his behalf by Kelly Busch, a trained medical scribe. The creation of this document is based on the provider's statements to the medical scribe.  Kelly Busch July 25, 2018 10:05 AM     OBJECTIVE:   /82 (BP Location: Right arm, Patient Position: Sitting, Cuff Size: Adult Large)  Pulse 77  Temp 98.4  F (36.9  C) (Oral)  Resp 14  Ht 5' 4.57\" (1.64 m)  Wt 140 lb (63.5 kg)  SpO2 96%  Breastfeeding? No  BMI 23.61 kg/m2 Estimated body mass index is 23.61 kg/(m^2) as calculated from the following:    Height as of this encounter: 5' 4.57\" (1.64 m).    Weight as of this encounter: 140 lb (63.5 kg).     EXAM:   GENERAL APPEARANCE: healthy, alert and no distress  EYES: Eyes grossly normal to inspection, PERRL and conjunctivae and sclerae normal  HENT: ear canals and TM's normal, nose and mouth without ulcers or lesions, oropharynx clear and oral mucous membranes moist  NECK: no adenopathy, no asymmetry, masses, or scars and thyroid normal to palpation  RESP: lungs clear to auscultation - no rales, rhonchi or wheezes  CV: regular rate and rhythm, normal S1 S2, no S3 or S4, no murmur, click or rub, no peripheral edema and peripheral pulses strong  BREAST: normal without suspicious masses, skin changes or axillary nodes.  ABDOMEN: soft, nontender, no hepatosplenomegaly, no masses and bowel sounds normal  MS: no musculoskeletal defects are noted and gait is age appropriate without ataxia  SKIN: Patchy alopecia over vertex and posterior scalp. Erythema with some scaling noted on base of neck/scalp.   NEURO: Normal strength and tone, sensory exam grossly normal, mentation intact and speech normal  PSYCH: mentation appears normal and affect normal/bright     not performed    Diagnostic Test Results:  No results found for this or any previous visit (from " the past 24 hour(s)).    ASSESSMENT / PLAN:   (Z00.00) Medicare annual wellness visit, subsequent  (primary encounter diagnosis)  Comment: Stable health. See epic orders.  Plan: Comprehensive metabolic panel, CBC with         platelets differential, GARLIC PO          (E78.5) Hyperlipidemia LDL goal <130  Comment: Checking LDL today. Not on a statin, patient did not tolerate crestor.   Plan: STATIN NOT PRESCRIBED, INTENTIONAL,, aspirin 81        MG tablet          (M05.79) Seropositive rheumatoid arthritis of multiple sites (H)  Comment: Stable. Continue current meds and follow up with Dr. Torres as recommended    (D36.9) Adenomatous polyp  Comment: Referred to gastroenterology for colonoscopy.   Plan: GASTROENTEROLOGY ADULT REF PROCEDURE ONLY         Dom Curt [phone]; No Provider         Preference          (F33.42) Major depressive disorder, recurrent episode, in full remission (H)  Comment: Stable. Continue current meds and follow up with mental health as recommended    (L65.9) Alopecia  Comment: Checking thyroid levels today  Plan: TSH with free T4 reflex          (N95.1) Menopausal state  Comment: DEXA scan ordered  Plan: DX Hip/Pelvis/Spine          BP elevated today. No med changes today--patient is not interested in adding a new medication.  Recommend sporadically checking BP at home. Follow up in 2 months for BP check    Check your insurance to see if they cover the new shingles vaccination (series of two shots).     We can update your second of two pneumonia vaccinations today.     Someone will call you to help schedule a colonoscopy and a bone density test.     Refills of medications have been faxed to your pharmacy.     Stop by the lab again to add a thyroid blood test.   I'll get back to you with lab results soon, especially if there is anything of concern.            Blood pressure high today. Check some blood pressures at                      home (maybe once every couple of weeks),  "then see me back in               about two months (bring your home cuff along).     End of Life Planning:  Patient currently has an advanced directive: No.  I have verified the patient's ablity to prepare an advanced directive/make health care decisions.  Literature was provided to assist patient in preparing an advanced directive.    COUNSELING:  Reviewed preventive health counseling, as reflected in patient instructions       Regular exercise       Healthy diet/nutrition       Immunizations       Osteoporosis Prevention/Bone Health       Colon cancer screening    BP Readings from Last 1 Encounters:   07/25/18 139/82     Estimated body mass index is 23.61 kg/(m^2) as calculated from the following:    Height as of this encounter: 5' 4.57\" (1.64 m).    Weight as of this encounter: 140 lb (63.5 kg).   reports that she has quit smoking. She quit after 10.00 years of use. She has never used smokeless tobacco.    Appropriate preventive services were discussed with this patient, including applicable screening as appropriate for cardiovascular disease, diabetes, osteopenia/osteoporosis, and glaucoma.  As appropriate for age/gender, discussed screening for colorectal cancer, prostate cancer, breast cancer, and cervical cancer. Checklist reviewing preventive services available has been given to the patient.    Reviewed patients plan of care and provided an AVS. The Basic Care Plan (routine screening as documented in Health Maintenance) for Emma meets the Care Plan requirement. This Care Plan has been established and reviewed with the Patient.    Counseling Resources:  ATP IV Guidelines  Pooled Cohorts Equation Calculator  Breast Cancer Risk Calculator  FRAX Risk Assessment  ICSI Preventive Guidelines  Dietary Guidelines for Americans, 2010  USDA's MyPlate  ASA Prophylaxis  Lung CA Screening    The information in this document, created by the medical scribe for me, accurately reflects the services I personally performed " and the decisions made by me. I have reviewed and approved this document for accuracy prior to leaving the patient care area.  July 25, 2018 10:05 AM    Gene Scott MD  [facility]

## 2018-07-25 NOTE — NURSING NOTE
Screening Questionnaire for Adult Immunization      Are you sick today?   No   Do you have allergies to medications, food, a vaccine component or latex?   No   Have you ever had a serious reaction after receiving a vaccination?   No   Do you have a long-term health problem with heart disease, lung disease, asthma, kidney disease, metabolic disease (e.g. diabetes), anemia, or other blood disorder?   No   Do you have cancer, leukemia, HIV/AIDS, or any other immune system problem?   No   In the past 3 months, have you taken medications that affect  your immune system, such as prednisone, other steroids, or anticancer drugs; drugs for the treatment of rheumatoid arthritis, Crohn s disease, or psoriasis; or have you had radiation treatments?   Yes   Have you had a seizure, or a brain or other nervous system problem?   No   During the past year, have you received a transfusion of blood or blood     products, or been given immune (gamma) globulin or antiviral drug?   No   For women: Are you pregnant or is there a chance you could become        pregnant during the next month?   No   Have you received any vaccinations in the past 4 weeks?   No     Immunization questionnaire was positive for at least one answer-stable.  Notified Dr. Scott.      Per orders of Dr. Scott, injection of Pneumococcal 23 given by Shirlene Priest. Patient instructed to remain in clinic for 15 minutes afterwards, and to report any adverse reaction to me immediately.       Screening performed by Shirlene Priest on 7/25/2018 at 11:24 AM.

## 2018-07-25 NOTE — MR AVS SNAPSHOT
After Visit Summary   7/25/2018    Emma Jenkins    MRN: [id number]           Patient Information     Date Of Birth          1945        Visit Information        Provider Department      7/25/2018 10:00 AM Gene Scott MD [facility]        Today's Diagnoses     Medicare annual wellness visit, subsequent    -  1    Hyperlipidemia LDL goal <130        Seropositive rheumatoid arthritis of multiple sites (H)        Adenomatous polyp        Major depressive disorder, recurrent episode, in full remission (H)        Alopecia        Menopausal state          Care Instructions      Preventive Health Recommendations    Female Ages 65 +    Yearly exam:     See your health care provider every year in order to  o Review health changes.   o Discuss preventive care.    o Review your medicines if your doctor has prescribed any.      You no longer need a yearly Pap test unless you've had an abnormal Pap test in the past 10 years. If you have vaginal symptoms, such as bleeding or discharge, be sure to talk with your provider about a Pap test.      Every 1 to 2 years, have a mammogram.  If you are over 69, talk with your health care provider about whether or not you want to continue having screening mammograms.      Every 10 years, have a colonoscopy. Or, have a yearly FIT test (stool test). These exams will check for colon cancer.       Have a cholesterol test every 5 years, or more often if your doctor advises it.       Have a diabetes test (fasting glucose) every three years. If you are at risk for diabetes, you should have this test more often.       At age 65, have a bone density scan (DEXA) to check for osteoporosis (brittle bone disease).    Shots:    Get a flu shot each year.    Get a tetanus shot every 10 years.    Talk to your doctor about your pneumonia vaccines. There are now two you should receive - Pneumovax (PPSV 23) and Prevnar (PCV 13).    Talk to your pharmacist about the  shingles vaccine.    Talk to your doctor about the hepatitis B vaccine.    Nutrition:     Eat at least 5 servings of fruits and vegetables each day.      Eat whole-grain bread, whole-wheat pasta and brown rice instead of white grains and rice.      Get adequate Calcium and Vitamin D.     Lifestyle    Exercise at least 150 minutes a week (30 minutes a day, 5 days a week). This will help you control your weight and prevent disease.      Limit alcohol to one drink per day.      No smoking.       Wear sunscreen to prevent skin cancer.       See your dentist twice a year for an exam and cleaning.      See your eye doctor every 1 to 2 years to screen for conditions such as glaucoma, macular degeneration and cataracts.      Check your insurance to see if they cover the new shingles vaccination (series of two shots).     We can update your second of two pneumonia vaccinations today.     Someone will call you to help schedule a colonoscopy and a bone density test.     Refills of medications have been faxed to your pharmacy.     Stop by the lab again to add a thyroid blood test.   I'll get back to you with lab results soon, especially if there is anything of concern.            Blood pressure high today. Check some blood pressures at                home (maybe once every couple of weeks), then see me back in         about two months (bring your home cuff along).               Follow-ups after your visit        Additional Services     GASTROENTEROLOGY ADULT REF PROCEDURE ONLY Dom Elias [phone]; No Provider Preference       Last Lab Result: Creatinine (mg/dL)       Date                     Value                 06/07/2018               0.720            ----------  Body mass index is 23.61 kg/(m^2).      Patient will be contacted to schedule procedure.     Please be aware that coverage of these services is subject to the terms and limitations of your health insurance plan.  Call member services at your health plan  "with any benefit or coverage questions.  Any procedures must be performed at a [facility] facility OR coordinated by your clinic's referral office.    Please bring the following with you to your appointment:    (1) Any X-Rays, CTs or MRIs which have been performed.  Contact the facility where they were done to arrange for  prior to your scheduled appointment.    (2) List of current medications   (3) This referral request   (4) Any documents/labs given to you for this referral                  Your next 10 appointments already scheduled     Aug 28, 2018  8:45 AM CDT   MA SCREENING DIGITAL BILATERAL with [id number]   [facility] ([facility])    [address] [facility], [address]  [facility] [address] [phone]           Do not use any powder, lotion or deodorant under your arms or on your breast. If you do, we will ask you to remove it before your exam.  Wear comfortable, two-piece clothing.  If you have any allergies, tell your care team.  Bring any previous mammograms from other facilities or have them mailed to the breast center. Three-dimensional (3D) mammograms are available at [facility] locations in [facility], [facility], [place], [place], and [place]. [facility] locations include [place] and [facility] in [place]. Benefits of 3D mammograms include: - Improved rate of cancer detection - Decreases your chance of having to go back for more tests, which means fewer: - \"False-positive\" results (This means that there is an abnormal area but it isn't cancer.) - Invasive testing procedures, such as a biopsy or surgery - Can provide clearer images of the breast if you have dense breast tissue. 3D mammography is an optional exam that anyone can have with a 2D mammogram. It doesn't replace or take the place of a 2D mammogram. 2D mammograms remain an effective screening test for all women.  Not all insurance companies cover the cost of a " "3D mammogram. Check with your insurance.              Future tests that were ordered for you today     Open Future Orders        Priority Expected Expires Ordered    DX Hip/Pelvis/Spine Routine  2019    MA Screening Digital Bilateral Routine  2019            Who to contact     If you have questions or need follow up information about today's clinic visit or your schedule please contact [facility] directly at [phone].  Normal or non-critical lab and imaging results will be communicated to you by [employer], letter or phone within 4 business days after the clinic has received the results. If you do not hear from us within 7 days, please contact the clinic through [employer] or phone. If you have a critical or abnormal lab result, we will notify you by phone as soon as possible.  Submit refill requests through Bvents or call your pharmacy and they will forward the refill request to us. Please allow 3 business days for your refill to be completed.          Additional Information About Your Visit        Bvents Information     Bvents lets you send messages to your doctor, view your test results, renew your prescriptions, schedule appointments and more. To sign up, go to [email or web address] . Click on \"Log in\" on the left side of the screen, which will take you to the Welcome page. Then click on \"Sign up Now\" on the right side of the page.     You will be asked to enter the access code listed below, as well as some personal information. Please follow the directions to create your username and password.     Your access code is: [email or web address]  Expires: 10/7/2018  5:26 PM     Your access code will  in 90 days. If you need help or a new code, please call your [facility] clinic or [phone].        Care EveryWhere ID     This is your Care EveryWhere ID. This could be used by other organizations to access your [facility] medical records  [id number]        Your Vitals " "Were     Pulse Temperature Respirations Height Pulse Oximetry Breastfeeding?    77 98.4  F (36.9  C) (Oral) 14 5' 4.57\" (1.64 m) 96% No    BMI (Body Mass Index)                   23.61 kg/m2            Blood Pressure from Last 3 Encounters:   07/25/18 139/82   07/09/18 (!) 155/94   07/18/17 134/78    Weight from Last 3 Encounters:   07/25/18 140 lb (63.5 kg)   07/18/17 157 lb (71.2 kg)   07/12/17 157 lb (71.2 kg)              We Performed the Following     CBC with platelets differential     Comprehensive metabolic panel     GASTROENTEROLOGY ADULT REF PROCEDURE ONLY Dom Elias [phone]; No Provider Preference     Lipid panel reflex to direct LDL Fasting          Today's Medication Changes          These changes are accurate as of 7/25/18 10:57 AM.  If you have any questions, ask your nurse or doctor.               Start taking these medicines.        Dose/Directions    aspirin 81 MG tablet   Used for:  Hyperlipidemia LDL goal <130   Started by:  Gene Scott MD        Dose:  81 mg   Take 1 tablet (81 mg) by mouth daily   Quantity:  30 tablet   Refills:  0       STATIN NOT PRESCRIBED (INTENTIONAL)   Used for:  Hyperlipidemia LDL goal <130   Started by:  Gene Scott MD        Please choose reason not prescribed, below   Refills:  0            Where to get your medicines      Some of these will need a paper prescription and others can be bought over the counter.  Ask your nurse if you have questions.     You don't need a prescription for these medications     aspirin 81 MG tablet    STATIN NOT PRESCRIBED (INTENTIONAL)                Primary Care Provider Office Phone # Fax #    Gene Scott [phone]       [address]  [facility] [address]        Equal Access to Services     [facility] : Hadlia Easley, waaxda lunaye, qaybta raúl rivera. So [facility] [phone].    ATENCIÓN: Si hortensia nair " monzon disposición servicios gratuitos de asistencia lingüística. Arnie mcleod [phone].    We comply with applicable federal civil rights laws and [place] laws. We do not discriminate on the basis of race, color, national origin, age, disability, sex, sexual orientation, or gender identity.            Thank you!     Thank you for choosing [facility]  for your care. Our goal is always to provide you with excellent care. Hearing back from our patients is one way we can continue to improve our services. Please take a few minutes to complete the written survey that you may receive in the mail after your visit with us. Thank you!             Your Updated Medication List - Protect others around you: Learn how to safely use, store and throw away your medicines at www.disposemymeds.org.          This list is accurate as of 7/25/18 10:57 AM.  Always use your most recent med list.                   Brand Name Dispense Instructions for use Diagnosis    acyclovir 400 MG tablet    ZOVIRAX    15 tablet    TAKE ONE TABLET BY MOUTH THREE TIMES A DAY    Recurrent HSV (herpes simplex virus)       aspirin 81 MG tablet     30 tablet    Take 1 tablet (81 mg) by mouth daily    Hyperlipidemia LDL goal <130       Calcium carb-Vitamin D 500 mg [place]-200 units 500-200 MG-UNIT per tablet    OSCAL with D;Oyster Shell Calcium     Take 1 tablet by mouth every morning        citalopram 40 MG tablet    celeXA     Take 20 mg by mouth daily        etanercept 50 MG/ML injection    ENBREL     Inject 50 mg Subcutaneous once a week On Saturdays        GARLIC PO      Take 1 capsule by mouth Patient takes 4 capsules daily. Dose unknown.    Medicare annual wellness visit, subsequent       leflunomide 10 MG tablet    ARAVA     Take 20 mg by mouth every other day        metoprolol tartrate 50 MG tablet    LOPRESSOR    135 tablet    25mg q am, 50mg q hs    Tachycardia       multivitamin, therapeutic Tabs tablet      Take 1 tablet by mouth daily         OMEGA-3 FISH OIL PO      Take 1 g by mouth daily        omeprazole 40 MG capsule    priLOSEC    90 capsule    Take 1 capsule (40 mg) by mouth daily Take 30-60 minutes before a meal.    Gastroesophageal reflux disease without esophagitis       STATIN NOT PRESCRIBED (INTENTIONAL)      Please choose reason not prescribed, below    Hyperlipidemia LDL goal <130       traZODone 75 mg Tabs half-tab    DESYREL     Take 50 mg by mouth At Bedtime        VITAMIN D3 PO      Take 2,500 Units by mouth daily

## 2018-07-26 ENCOUNTER — TELEPHONE (OUTPATIENT)
Dept: BONE DENSITY | Facility: CLINIC | Age: 73
End: 2018-07-26

## 2018-07-26 ASSESSMENT — PATIENT HEALTH QUESTIONNAIRE - PHQ9: SUM OF ALL RESPONSES TO PHQ QUESTIONS 1-9: 0

## 2018-08-03 DIAGNOSIS — K21.9 GASTROESOPHAGEAL REFLUX DISEASE WITHOUT ESOPHAGITIS: ICD-10-CM

## 2018-08-04 RX ORDER — OMEPRAZOLE 40 MG/1
CAPSULE, DELAYED RELEASE ORAL
Qty: 90 CAPSULE | Refills: 3 | Status: SHIPPED | OUTPATIENT
Start: 2018-08-04 | End: 2019-05-01

## 2018-08-04 NOTE — TELEPHONE ENCOUNTER
"Requested Prescriptions   Pending Prescriptions Disp Refills     omeprazole (PRILOSEC) 40 MG capsule [Pharmacy Med Name: OMEPRAZOLE 40MG CPDR] 90 capsule 3     Sig: TAKE ONE CAPSULE BY MOUTH EVERY DAY . TAKE 30-60 MINUTES BEFORE A MEAL    PPI Protocol Passed    8/3/2018  4:25 PM       Passed - Not on Clopidogrel (unless Pantoprazole ordered)       Passed - No diagnosis of osteoporosis on record       Passed - Recent (12 mo) or future (30 days) visit within the authorizing provider's specialty    Patient had office visit in the last 12 months or has a visit in the next 30 days with authorizing provider or within the authorizing provider's specialty.  See \"Patient Info\" tab in inbasket, or \"Choose Columns\" in Meds & Orders section of the refill encounter.           Passed - Patient is age 18 or older       Passed - No active pregnacy on record       Passed - No positive pregnancy test in past 12 months          Prescription approved per [facility] Refill Protocol.    "

## 2018-08-06 ENCOUNTER — TELEPHONE (OUTPATIENT)
Dept: INTERNAL MEDICINE | Facility: CLINIC | Age: 73
End: 2018-08-06

## 2018-08-06 NOTE — TELEPHONE ENCOUNTER
Patient called. Stated for the past 1-2wks she has been having mouth/teeth pain. Patient had a fall in 4/2018 (hit face), but patient has done fine since then, so not sure what its related to. Patient cant see any lesions/blisters in mouth, but when she brushes her teeth her gums bleed. Relayed to start out with dentist. Patient verbalized understanding

## 2018-08-09 ENCOUNTER — TELEPHONE (OUTPATIENT)
Dept: INTERNAL MEDICINE | Facility: CLINIC | Age: 73
End: 2018-08-09

## 2018-08-09 NOTE — TELEPHONE ENCOUNTER
"Patient calls. She was in [place] in April and fell, landing very hard on her jaw. Patient thought she was okay following the fall, but now noticed difficulty with attention, neck pain, mouth/jaw/chin pain, teeth hurt and bleed when brushing them, some \"wooziness\" and having to readjust her glasses so she can see. Denies nausea, vomiting or headaches.   She saw her dentist yesterday and discussed her symptoms with them. The dentist thought she may have hit her jaw heard enough to jam the jaw into her skull, but also mentioned she could have possibly caused a concussion and/or whiplash with her fall. The dentist told her the symptoms were beyond their skill and recommended she see an oral surgeon. Patient has scheduled an appointment with [facility] in [place] for follow-up. However she asks for guidance from Dr. Scott as well regarding her symptoms.   "

## 2018-08-09 NOTE — TELEPHONE ENCOUNTER
Agree with seeing oral surgery first, especially about the jaw symptoms.     If she is bothered by persistent dizziness/mental clarity, or nausea/headaches, she could consider seeing one of our providers to address those symptoms.     Please advise pt.

## 2018-08-09 NOTE — TELEPHONE ENCOUNTER
Called patient-relayed below. Patient will see oral surgery regarding Jaw symptoms. Transferred to scheduling so patient can set up an appointment with phani basurto

## 2018-08-13 ENCOUNTER — RADIANT APPOINTMENT (OUTPATIENT)
Dept: GENERAL RADIOLOGY | Facility: CLINIC | Age: 73
End: 2018-08-13
Attending: INTERNAL MEDICINE
Payer: COMMERCIAL

## 2018-08-13 ENCOUNTER — OFFICE VISIT (OUTPATIENT)
Dept: INTERNAL MEDICINE | Facility: CLINIC | Age: 73
End: 2018-08-13
Payer: COMMERCIAL

## 2018-08-13 VITALS
OXYGEN SATURATION: 96 % | BODY MASS INDEX: 23.98 KG/M2 | TEMPERATURE: 97 F | RESPIRATION RATE: 13 BRPM | WEIGHT: 142.2 LBS | DIASTOLIC BLOOD PRESSURE: 94 MMHG | HEART RATE: 80 BPM | SYSTOLIC BLOOD PRESSURE: 138 MMHG

## 2018-08-13 DIAGNOSIS — M54.2 NECK PAIN: ICD-10-CM

## 2018-08-13 DIAGNOSIS — M26.69 TMJ INFLAMMATION: ICD-10-CM

## 2018-08-13 DIAGNOSIS — M26.69 TMJ INFLAMMATION: Primary | ICD-10-CM

## 2018-08-13 DIAGNOSIS — R03.0 ELEVATED BLOOD PRESSURE READING WITHOUT DIAGNOSIS OF HYPERTENSION: ICD-10-CM

## 2018-08-13 PROCEDURE — 72040 X-RAY EXAM NECK SPINE 2-3 VW: CPT

## 2018-08-13 PROCEDURE — 70100 X-RAY EXAM OF JAW <4VIEWS: CPT

## 2018-08-13 PROCEDURE — 99214 OFFICE O/P EST MOD 30 MIN: CPT | Performed by: INTERNAL MEDICINE

## 2018-08-13 RX ORDER — CYCLOBENZAPRINE HCL 10 MG
10 TABLET ORAL 2 TIMES DAILY PRN
Qty: 15 TABLET | Refills: 0 | Status: SHIPPED | OUTPATIENT
Start: 2018-08-13 | End: 2018-10-03

## 2018-08-13 NOTE — NURSING NOTE
BP (!) 138/94  Pulse 80  Temp 97  F (36.1  C) (Oral)  Resp 13  Wt 142 lb 3.2 oz (64.5 kg)  SpO2 96%  BMI 23.98 kg/m2  Patient in for consult on Jaw pain, neck pain from fall 4/18.  Susan Gonzalez, CMA

## 2018-08-13 NOTE — PROGRESS NOTES
SUBJECTIVE:   Emma Jenkins is a 72 year old female who presents to clinic today for the following health issues:      Pt is a 72 year old female who is seen here to day  with c/o pain in bilateral  jaws since 2 wks. Pain mainly situated in front of bilateral ears and cheeks, has pain with brushing teeth , biting, chewing,  No ear pain. No URI symptoms, patient states that she was in [place] in April and when walking she tripped and fell on her chin, had a bloody nose but resolved.  Felt better she did not have any pain in her face and jaw.  2 weeks ago patient started noticing pain in bilateral jaw, pt also c/o bleeding while brushing, pt saw her dentist 1 wk ago and he told her this is beyond his scope of practice and advised to see oral surgeon. Pt has appt with oral surgeon in few days .  No headache, no dizziness, no vision changes.  Patient thinks all these symptoms are related to her fall in April 2018 and thinks she might have injured her jaw bone   Pt also complaints of pain at bilateral sides of neck since 2 wks.  The patient denies any symptoms of neurological impairment ,no  unilateral disturbance of motor or sensory function. No severe headaches or loss of balance. .no weakness of anu UE,no tingling or numbness.      Past Medical History:   Diagnosis Date     Anxiety and depression      Arthritis     rheumatoid arthritis     GERD (gastroesophageal reflux disease)      Heart arrhythmias          Current Outpatient Prescriptions   Medication Sig Dispense Refill     acyclovir (ZOVIRAX) 400 MG tablet TAKE ONE TABLET BY MOUTH THREE TIMES A DAY 15 tablet 5     aspirin 81 MG tablet Take 1 tablet (81 mg) by mouth daily 30 tablet 0     calcium carb 1250 mg, 500 mg [place],/vitamin D 200 units (OSCAL WITH D) 500-200 MG-UNIT per tablet Take 1 tablet by mouth every morning        Cholecalciferol (VITAMIN D3 PO) Take 2,500 Units by mouth daily        citalopram (CELEXA) 40 MG tablet Take 20 mg by mouth daily         etanercept (ENBREL) 50 MG/ML injection Inject 50 mg Subcutaneous once a week On Saturdays       GARLIC PO Take 1 capsule by mouth Patient takes 4 capsules daily. Dose unknown.       leflunomide (ARAVA) 10 MG tablet Take 20 mg by mouth every other day        metoprolol (LOPRESSOR) 50 MG tablet 25mg q am, 50mg q hs 135 tablet 3     multivitamin, therapeutic (THERA-VIT) TABS Take 1 tablet by mouth daily       Omega-3 Fatty Acids (OMEGA-3 FISH OIL PO) Take 1 g by mouth daily        omeprazole (PRILOSEC) 40 MG capsule TAKE ONE CAPSULE BY MOUTH EVERY DAY . TAKE 30-60 MINUTES BEFORE A MEAL 90 capsule 3     STATIN NOT PRESCRIBED, INTENTIONAL, Please choose reason not prescribed, below       TRAZodone (DESYREL) 75 MG TABS Take 50 mg by mouth At Bedtime               Ros;  General;negative  ENT: pain anu  jaw, no ear pain  Cvs;negative  Reps;negative  MS: neck pain   CNS;negative      Blood pressure (!) 138/94, pulse 80, temperature 97  F (36.1  C), temperature source Oral, resp. rate 13, weight 142 lb 3.2 oz (64.5 kg), SpO2 96 %, not currently breastfeeding. rpt /90   GENERAL:healthy, alert and no distress  HEENT-pupils equal and reactive to light and accommodation, TMs clear, oropharynx clear, has tenderness on bilateral TMJ and bilateral  pterygoid muscles.  RESP: Normal - Clear to auscultation without rales, rhonchi, or wheezing.  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 Advised not to drive or operate  any machinery while on this med  Call or return to clinic prn if these symtoms worsen, fail to improve as anticipated, or if new symptoms develop. Will refer to TMJ specialist if symptoms continues.    XR Mandible 1/3 Views       (M54.2) Neck pain  Plan:Bilateral  paracervical muscle spasm-try OTC Ibuprofen prn, start flexaril 10 mg as directed,sideeffects explained,not to drive while taking this med,heat prn,neck streches,Call or return to clinic prn if these symtoms worsen, fail to improve as anticipated, or if new symptoms develop., obtain XR Cervical Spine 2/3 Views.pt was told I will contact her after results and proceed accordingly.         (R03.0) Elevated blood pressure reading without diagnosis of hypertension  Plan: Discussed sodium restriction, maintaining ideal body weight and regular exercise program as physiologic means to achieve blood pressure control. The patient will strive towards this.  Continue home readings and return in 1-2 months.

## 2018-08-13 NOTE — MR AVS SNAPSHOT
"              After Visit Summary   8/13/2018    Emma Jenkins    MRN: [id number]           Patient Information     Date Of Birth          1945        Visit Information        Provider Department      8/13/2018 9:20 AM Yann Sebastian MD [facility]        Today's Diagnoses     TMJ inflammation    -  1    Neck pain        Elevated blood pressure reading without diagnosis of hypertension           Follow-ups after your visit        Your next 10 appointments already scheduled     Aug 28, 2018  8:45 AM CDT   MA SCREENING DIGITAL BILATERAL with [id number]   [facility] ([facility])    [address], [address]  [facility] [id number] [phone]           Do not use any powder, lotion or deodorant under your arms or on your breast. If you do, we will ask you to remove it before your exam.  Wear comfortable, two-piece clothing.  If you have any allergies, tell your care team.  Bring any previous mammograms from other facilities or have them mailed to the breast center. Three-dimensional (3D) mammograms are available at [facility] locations in [facility], [facility], and [place]. [facility] locations include [place] and [facility] in [place]. Benefits of 3D mammograms include: - Improved rate of cancer detection - Decreases your chance of having to go back for more tests, which means fewer: - \"False-positive\" results (This means that there is an abnormal area but it isn't cancer.) - Invasive testing procedures, such as a biopsy or surgery - Can provide clearer images of the breast if you have dense breast tissue. 3D mammography is an optional exam that anyone can have with a 2D mammogram. It doesn't replace or take the place of a 2D mammogram. 2D mammograms remain an effective screening test for all women.  Not all insurance companies cover the cost of a 3D mammogram. Check with your " "insurance.            Aug 29, 2018  9:00 AM CDT   DX HIP/PELVIS/SPINE with RIDX1   [facility]    [address]  [address]  [facility] [id number] [phone]           Please do not take any of the following 24 hours prior to the day of your exam: vitamins, calcium tablets, antacids.  If possible, please wear clothes without metal (snaps, zippers). A sweatsuit works well.            Oct 03, 2018  9:00 AM CDT   SHORT with Gene Scott MD   [facility] ([facility])    [address]  [facility] [id number] [phone]            Oct 26, 2018   Procedure with Daljit Luke MD   [facility] (--)    [address] [facility] [id number] [phone]              Who to contact     If you have questions or need follow up information about today's clinic visit or your schedule please contact [facility] directly at [phone].  Normal or non-critical lab and imaging results will be communicated to you by MyChart, letter or phone within 4 business days after the clinic has received the results. If you do not hear from us within 7 days, please contact the clinic through [employer] or phone. If you have a critical or abnormal lab result, we will notify you by phone as soon as possible.  Submit refill requests through [employer] or call your pharmacy and they will forward the refill request to us. Please allow 3 business days for your refill to be completed.          Additional Information About Your Visit        Talkwheel Information     Talkwheel lets you send messages to your doctor, view your test results, renew your prescriptions, schedule appointments and more. To sign up, go to [email or web address] . Click on \"Log in\" on the left side of the screen, which will take you to the Welcome page. Then click on \"Sign up Now\" on the right side of the page.     You will be " asked to enter the access code listed below, as well as some personal information. Please follow the directions to create your username and password.     Your access code is: [email or web address]  Expires: 2018  9:09 AM     Your access code will  in 90 days. If you need help or a new code, please call your [place] clinic or [phone].        Care EveryWhere ID     This is your Care EveryWhere ID. This could be used by other organizations to access your [facility] medical records  [id number]        Your Vitals Were     Pulse Temperature Respirations Pulse Oximetry BMI (Body Mass Index)       80 97  F (36.1  C) (Oral) 13 96% 23.98 kg/m2        Blood Pressure from Last 3 Encounters:   18 (!) 138/94   18 139/82   18 (!) 155/94    Weight from Last 3 Encounters:   18 142 lb 3.2 oz (64.5 kg)   18 140 lb (63.5 kg)   17 157 lb (71.2 kg)                 Today's Medication Changes          These changes are accurate as of 18 10:52 AM.  If you have any questions, ask your nurse or doctor.               Start taking these medicines.        Dose/Directions    cyclobenzaprine 10 MG tablet   Commonly known as:  FLEXERIL   Used for:  TMJ inflammation, Neck pain   Started by:  Yann Sebastian MD        Dose:  10 mg   Take 1 tablet (10 mg) by mouth 2 times daily as needed for muscle spasms   Quantity:  15 tablet   Refills:  0            Where to get your medicines      These medications were sent to [place] [address] [place] [address] [facility] [address] [facility] [address]     Phone:  [phone]     cyclobenzaprine 10 MG tablet                Primary Care Provider Office Phone # Fax #    Gene Scott [phone]       [address] [facility] [address]  [facility] [address]        Equal Access to Services     LORI CABAN AH: Doug Easley, olvin maciel, qaybta kaalmada adeegyaraúl schulz  la'christiannen melissa. So [facility] [phone].    ATENCIÓN: Si juanyla mirela, tiene a monzon disposición servicios gratuitos de asistencia lingüística. Arnie mcleod [phone].    We comply with applicable federal civil rights laws and [place] laws. We do not discriminate on the basis of race, color, national origin, age, disability, sex, sexual orientation, or gender identity.            Thank you!     Thank you for choosing [facility]  for your care. Our goal is always to provide you with excellent care. Hearing back from our patients is one way we can continue to improve our services. Please take a few minutes to complete the written survey that you may receive in the mail after your visit with us. Thank you!             Your Updated Medication List - Protect others around you: Learn how to safely use, store and throw away your medicines at www.disposemymeds.org.          This list is accurate as of 8/13/18 10:52 AM.  Always use your most recent med list.                   Brand Name Dispense Instructions for use Diagnosis    acyclovir 400 MG tablet    ZOVIRAX    15 tablet    TAKE ONE TABLET BY MOUTH THREE TIMES A DAY    Recurrent HSV (herpes simplex virus)       aspirin 81 MG tablet     30 tablet    Take 1 tablet (81 mg) by mouth daily    Hyperlipidemia LDL goal <130       Calcium carb-Vitamin D 500 mg [place]-200 units 500-200 MG-UNIT per tablet    OSCAL with D;Oyster Shell Calcium     Take 1 tablet by mouth every morning        citalopram 40 MG tablet    celeXA     Take 20 mg by mouth daily        cyclobenzaprine 10 MG tablet    FLEXERIL    15 tablet    Take 1 tablet (10 mg) by mouth 2 times daily as needed for muscle spasms    TMJ inflammation, Neck pain       etanercept 50 MG/ML injection    ENBREL     Inject 50 mg Subcutaneous once a week On Saturdays        GARLIC PO      Take 1 capsule by mouth Patient takes 4 capsules daily. Dose unknown.    Medicare annual wellness visit, subsequent       leflunomide 10 MG  tablet    ARAVA     Take 20 mg by mouth every other day        metoprolol tartrate 50 MG tablet    LOPRESSOR    135 tablet    25mg q am, 50mg q hs    Tachycardia       multivitamin, therapeutic Tabs tablet      Take 1 tablet by mouth daily        OMEGA-3 FISH OIL PO      Take 1 g by mouth daily        omeprazole 40 MG capsule    priLOSEC    90 capsule    TAKE ONE CAPSULE BY MOUTH EVERY DAY . TAKE 30-60 MINUTES BEFORE A MEAL    Gastroesophageal reflux disease without esophagitis       STATIN NOT PRESCRIBED (INTENTIONAL)      Please choose reason not prescribed, below    Hyperlipidemia LDL goal <130       traZODone 75 mg Tabs half-tab    DESYREL     Take 50 mg by mouth At Bedtime        VITAMIN D3 PO      Take 2,500 Units by mouth daily

## 2018-08-28 ENCOUNTER — HOSPITAL ENCOUNTER (OUTPATIENT)
Dept: MAMMOGRAPHY | Facility: CLINIC | Age: 73
Discharge: HOME | End: 2018-08-28
Attending: INTERNAL MEDICINE | Admitting: INTERNAL MEDICINE
Payer: MEDICARE

## 2018-08-28 ENCOUNTER — TELEPHONE (OUTPATIENT)
Dept: INTERNAL MEDICINE | Facility: CLINIC | Age: 73
End: 2018-08-28

## 2018-08-28 DIAGNOSIS — Z12.39 BREAST SCREENING: ICD-10-CM

## 2018-08-28 DIAGNOSIS — R00.0 TACHYCARDIA: ICD-10-CM

## 2018-08-28 PROCEDURE — 77063 BREAST TOMOSYNTHESIS BI: CPT

## 2018-08-28 RX ORDER — METOPROLOL TARTRATE 50 MG
TABLET ORAL
Qty: 135 TABLET | Refills: 0 | Status: SHIPPED | OUTPATIENT
Start: 2018-08-28 | End: 2018-11-21

## 2018-08-28 NOTE — TELEPHONE ENCOUNTER
Reason for Call:  Medication or medication refill:    Do you use a [facility]?  Name of the pharmacy and phone number for the current request:  [facility]    Name of the medication requested: metoprolol (LOPRESSOR) 50 MG tablet    Other request: Patient is almost out of this medication and is requesting a rush on this refill    Can we leave a detailed message on this number? YES    Phone number patient can be reached at: Home number on file [phone]    Best Time: any    Call taken on 8/28/2018 at 9:13 AM by Yanet Riggs

## 2018-08-28 NOTE — TELEPHONE ENCOUNTER
"Requested Prescriptions   Pending Prescriptions Disp Refills     metoprolol tartrate (LOPRESSOR) 50 MG tablet [Pharmacy Med Name: METOPROLOL TARTRATE 50MG TABS]  Last Written Prescription Date:  7/18/17  Last Fill Quantity: 135,  # refills: 3   Last office visit: 8/13/2018 with prescribing provider:  Jaz   Future Office Visit:   Next 5 appointments (look out 90 days)     Oct 03, 2018  9:00 AM CDT   SHORT with Gene Scott MD   [facility]    [address]  [facility] [address]   [phone]                 135 tablet 3     Sig: TAKE ONE-HALF TABLET BY MOUTH EVERY MORNING AND TAKE ONE TABLET BY MOUTH EVERY NIGHT AT BEDTIME    Beta-Blockers Protocol Failed    8/28/2018 10:41 AM       Failed - Blood pressure under 140/90 in past 12 months    BP Readings from Last 3 Encounters:   08/13/18 (!) 138/94   07/25/18 139/82   07/09/18 (!) 155/94                Passed - Patient is age 6 or older       Passed - Recent (12 mo) or future (30 days) visit within the authorizing provider's specialty    Patient had office visit in the last 12 months or has a visit in the next 30 days with authorizing provider or within the authorizing provider's specialty.  See \"Patient Info\" tab in inbasket, or \"Choose Columns\" in Meds & Orders section of the refill encounter.          Medication is being filled for 1 time refill only due to:  future appointment scheduled   "

## 2018-09-05 ENCOUNTER — TRANSFERRED RECORDS (OUTPATIENT)
Dept: HEALTH INFORMATION MANAGEMENT | Facility: CLINIC | Age: 73
End: 2018-09-05

## 2018-09-10 ENCOUNTER — THERAPY VISIT (OUTPATIENT)
Dept: PHYSICAL THERAPY | Facility: CLINIC | Age: 73
End: 2018-09-10
Payer: MEDICARE

## 2018-09-10 DIAGNOSIS — M54.2 CERVICALGIA: Primary | ICD-10-CM

## 2018-09-10 PROCEDURE — 97110 THERAPEUTIC EXERCISES: CPT | Mod: GP | Performed by: PHYSICAL THERAPIST

## 2018-09-10 PROCEDURE — G8981 BODY POS CURRENT STATUS: HCPCS | Mod: GP | Performed by: PHYSICAL THERAPIST

## 2018-09-10 PROCEDURE — 97161 PT EVAL LOW COMPLEX 20 MIN: CPT | Mod: GP | Performed by: PHYSICAL THERAPIST

## 2018-09-10 PROCEDURE — G8982 BODY POS GOAL STATUS: HCPCS | Mod: GP | Performed by: PHYSICAL THERAPIST

## 2018-09-10 NOTE — LETTER
DEPARTMENT OF HEALTH AND HUMAN SERVICES  CENTERS FOR MEDICARE & MEDICAID SERVICES    PLAN/UPDATED PLAN OF PROGRESS FOR OUTPATIENT REHABILITATION    PATIENTS NAME:  Emma Jenkins   : 1945  PROVIDER NUMBER:    [id number]  [id number]   PROVIDER NAME: SUSI INOCENCIA HANNON PT  MEDICAL RECORD NUMBER: [id number]   START OF CARE DATE:  SOC Date: 09/10/18   TYPE:  PT  PRIMARY/TREATMENT DIAGNOSIS:Cervicalgia  VISITS FROM START OF CARE:  Rxs Used: 1     [facility] Initial Evaluation  Emma Jenkins is a 72 year old  female referred to physical therapy by Dr. Evans Torres for treatment of neck pain with Precautions/Restrictions/MD instructions eval and treat       Physical Therapy Initial Evaluation: Subjective History      Injury/Condition Details:  Presenting Complaint Neck pain   Onset Timing/Date 2018   Mechanism Pt tripped on a short curb ultimately landing onto chin resulting in significant neck pain      Symptom Behavior Details    Primary Pain Symptoms Location: Bilateral neck pain  Quality: achy   Frequency: Constant   Worst Pain 5/10   Best Pain 2/10   Symptom Provocators Weather changes   Symptom Relievers Medication very infrequently   Time of day dependent? No   Recent symptom change? Same      Prior Testing/Intervention for current condition:  Prior Tests X-ray of cervical spine on 18 indicating:  FINDINGS: Cervical alignment is anatomic. Moderate loss of disc space at C5-C6-C7 associated with small marginal osteophytes. Prevertebral soft tissues are normal. Mild facet joint sclerosis in the low cervical spine.   Prior Treatment None      Lifestyle & General Medical History:  General Health Reported by Patient good   Employment Retired   Usual physical activities  (within past year) Play with grandchildren, read, travel, cleaning house   Orthopaedic history None   Notable medical history Depression, high blood pressure, menopausal, OA, RA             PATIENTS  NAME:  Emma Jenkins   : 1945      Objective:  Standing Alignment:    Cervical/Thoracic:  Forward head  Shoulder/UE:  Rounded shoulders      PATIENTS NAME:  Emma Jenkins   : 1945            Cervical/Thoracic Evaluation    AROM:  AROM Cervical:    Flexion:            90%, mild pain  Extension:       75%, mild pain  Rotation:         Left: 90%, painfree     Right: 90%, painfree  Side Bend:      Left: 75%, mild pain     Right:  75%, mild pain  Headaches: none  Cervical Myotomes:  normal  Spinal Segmental Conclusions:    Level:  Hypo at C5, C6, C7 and T1    Assessment/Plan:    Patient is a 72 year old female with cervical complaints.    Patient has the following significant findings with corresponding treatment plan.                Diagnosis 1:  Neck pain  Pain -  manual therapy, splint/taping/bracing/orthotics, self management, education and home program  Decreased ROM/flexibility - manual therapy, therapeutic exercise, therapeutic activity and home program  Decreased joint mobility - manual therapy, therapeutic exercise, therapeutic activity and home program  Decreased strength - therapeutic exercise, therapeutic activities and home program  Decreased proprioception - neuro re-education, therapeutic activities and home program  Impaired muscle performance - neuro re-education and home program  Decreased function - therapeutic activities and home program    Therapy Evaluation Codes:   1) History comprised of:   Personal factors that impact the plan of care:      None.    Comorbidity factors that impact the plan of care are:      Depression, High blood pressure, Menopausal, Osteoarthritis and Rheumatoid arthritis.     Medications impacting care: Anti-depressant and Anti-inflammatory.  2) Examination of Body Systems comprised of:   Body structures and functions that impact the plan of care:      Cervical spine.   Activity limitations that impact the plan of care are:      Lifting, Sitting, Walking  "and Sleeping.  3) Clinical presentation characteristics are:   Stable/Uncomplicated.  4) Decision-Making    Low complexity using standardized patient assessment instrument and/or measureable assessment of functional outcome.  Cumulative Therapy Evaluation is: Low complexity.    Previous and current functional limitations:  (See Goal Flow Sheet for this information)    Short term and Long term goals: (See Goal Flow Sheet for this information)                       PATIENTS NAME:  Emma Jenkins   : 1945        Communication ability:  Patient appears to be able to clearly communicate and understand verbal and written communication and follow directions correctly.  Treatment Explanation - The following has been discussed with the patient:   RX ordered/plan of care  Anticipated outcomes  Possible risks and side effects  This patient would benefit from PT intervention to resume normal activities.   Rehab potential is good.    Frequency:  1 X week, once daily  Duration:  for 6 weeks  Discharge Plan:  Achieve all LTG.  Independent in home treatment program.  Reach maximal therapeutic benefit.            Caregiver Signature/Credentials _____________________________ Date ________      Treating Provider: Emile Kahn, DELBERTT, OCS     I have reviewed and certified the need for these services and plan of treatment while under my care.        PHYSICIAN'S SIGNATURE:   _________________________________________  Date___________        Evans Torres MD    Certification period:  Beginning of Cert date period: 09/10/18 to  End of Cert period date: 18     Functional Level Progress Report: Please see attached \"Goal Flow sheet for Functional level.\"    ____X____ Continue Services or       ________ DC Services                Service dates: From  SOC Date: 09/10/18 date to present                         "

## 2018-09-10 NOTE — PROGRESS NOTES
[facility] Initial Evaluation  Emma Jenkins is a 72 year old  female referred to physical therapy by Dr. Evans Torres for treatment of neck pain with Precautions/Restrictions/MD instructions eval and treat       Physical Therapy Initial Evaluation: Subjective History      Injury/Condition Details:  Presenting Complaint Neck pain   Onset Timing/Date April 2018   Mechanism Pt tripped on a short curb ultimately landing onto chin resulting in significant neck pain      Symptom Behavior Details    Primary Pain Symptoms Location: Bilateral neck pain  Quality: achy   Frequency: Constant   Worst Pain 5/10   Best Pain 2/10   Symptom Provocators Weather changes   Symptom Relievers Medication very infrequently   Time of day dependent? No   Recent symptom change? Same      Prior Testing/Intervention for current condition:  Prior Tests X-ray of cervical spine on 8/13/18 indicating:  FINDINGS: Cervical alignment is anatomic. Moderate loss of disc space at C5-C6-C7 associated with small marginal osteophytes. Prevertebral soft tissues are normal. Mild facet joint sclerosis in the low cervical spine.   Prior Treatment None      Lifestyle & General Medical History:  General Health Reported by Patient good   Employment Retired   Usual physical activities  (within past year) Play with grandchildren, read, travel, cleaning house   Orthopaedic history None   Notable medical history Depression, high blood pressure, menopausal, OA, RA         HPI                    Objective:  Standing Alignment:    Cervical/Thoracic:  Forward head  Shoulder/UE:  Rounded shoulders                                  Cervical/Thoracic Evaluation    AROM:  AROM Cervical:    Flexion:            90%, mild pain  Extension:       75%, mild pain  Rotation:         Left: 90%, painfree     Right: 90%, painfree  Side Bend:      Left: 75%, mild pain     Right:  75%, mild pain      Headaches: none  Cervical Myotomes:   normal                              Spinal Segmental Conclusions:    Level:  Hypo at C5, C6, C7 and T1                                                General     ROS    Assessment/Plan:    Patient is a 72 year old female with cervical complaints.    Patient has the following significant findings with corresponding treatment plan.                Diagnosis 1:  Neck pain  Pain -  manual therapy, splint/taping/bracing/orthotics, self management, education and home program  Decreased ROM/flexibility - manual therapy, therapeutic exercise, therapeutic activity and home program  Decreased joint mobility - manual therapy, therapeutic exercise, therapeutic activity and home program  Decreased strength - therapeutic exercise, therapeutic activities and home program  Decreased proprioception - neuro re-education, therapeutic activities and home program  Impaired muscle performance - neuro re-education and home program  Decreased function - therapeutic activities and home program    Therapy Evaluation Codes:   1) History comprised of:   Personal factors that impact the plan of care:      None.    Comorbidity factors that impact the plan of care are:      Depression, High blood pressure, Menopausal, Osteoarthritis and Rheumatoid arthritis.     Medications impacting care: Anti-depressant and Anti-inflammatory.  2) Examination of Body Systems comprised of:   Body structures and functions that impact the plan of care:      Cervical spine.   Activity limitations that impact the plan of care are:      Lifting, Sitting, Walking and Sleeping.  3) Clinical presentation characteristics are:   Stable/Uncomplicated.  4) Decision-Making    Low complexity using standardized patient assessment instrument and/or measureable assessment of functional outcome.  Cumulative Therapy Evaluation is: Low complexity.    Previous and current functional limitations:  (See Goal Flow Sheet for this information)    Short term and Long term goals: (See Goal  Flow Sheet for this information)     Communication ability:  Patient appears to be able to clearly communicate and understand verbal and written communication and follow directions correctly.  Treatment Explanation - The following has been discussed with the patient:   RX ordered/plan of care  Anticipated outcomes  Possible risks and side effects  This patient would benefit from PT intervention to resume normal activities.   Rehab potential is good.    Frequency:  1 X week, once daily  Duration:  for 6 weeks  Discharge Plan:  Achieve all LTG.  Independent in home treatment program.  Reach maximal therapeutic benefit.    Please refer to the daily flowsheet for treatment today, total treatment time and time spent performing 1:1 timed codes.

## 2018-09-19 ENCOUNTER — THERAPY VISIT (OUTPATIENT)
Dept: PHYSICAL THERAPY | Facility: CLINIC | Age: 73
End: 2018-09-19
Payer: MEDICARE

## 2018-09-19 DIAGNOSIS — L63.9 ALOPECIA AREATA: Primary | ICD-10-CM

## 2018-09-19 DIAGNOSIS — M54.2 CERVICALGIA: ICD-10-CM

## 2018-09-19 PROCEDURE — 86780 TREPONEMA PALLIDUM: CPT | Performed by: DERMATOLOGY

## 2018-09-19 PROCEDURE — 36415 COLL VENOUS BLD VENIPUNCTURE: CPT | Performed by: DERMATOLOGY

## 2018-09-19 PROCEDURE — 97110 THERAPEUTIC EXERCISES: CPT | Mod: GP | Performed by: PHYSICAL THERAPIST

## 2018-09-19 PROCEDURE — 84443 ASSAY THYROID STIM HORMONE: CPT | Performed by: DERMATOLOGY

## 2018-09-20 LAB
T PALLIDUM AB SER QL: NONREACTIVE
TSH SERPL DL<=0.005 MIU/L-ACNC: 2.65 MU/L (ref 0.4–4)

## 2018-10-03 ENCOUNTER — RADIANT APPOINTMENT (OUTPATIENT)
Dept: BONE DENSITY | Facility: CLINIC | Age: 73
End: 2018-10-03
Attending: INTERNAL MEDICINE
Payer: COMMERCIAL

## 2018-10-03 ENCOUNTER — OFFICE VISIT (OUTPATIENT)
Dept: INTERNAL MEDICINE | Facility: CLINIC | Age: 73
End: 2018-10-03
Payer: COMMERCIAL

## 2018-10-03 VITALS
HEART RATE: 74 BPM | BODY MASS INDEX: 22.99 KG/M2 | RESPIRATION RATE: 16 BRPM | DIASTOLIC BLOOD PRESSURE: 82 MMHG | OXYGEN SATURATION: 96 % | WEIGHT: 138 LBS | TEMPERATURE: 97.9 F | HEIGHT: 65 IN | SYSTOLIC BLOOD PRESSURE: 120 MMHG

## 2018-10-03 DIAGNOSIS — F41.9 ANXIETY: ICD-10-CM

## 2018-10-03 DIAGNOSIS — R03.0 ELEVATED BLOOD PRESSURE READING WITHOUT DIAGNOSIS OF HYPERTENSION: Primary | ICD-10-CM

## 2018-10-03 DIAGNOSIS — E78.5 HYPERLIPIDEMIA LDL GOAL <130: ICD-10-CM

## 2018-10-03 PROCEDURE — 99214 OFFICE O/P EST MOD 30 MIN: CPT | Performed by: INTERNAL MEDICINE

## 2018-10-03 PROCEDURE — 77080 DXA BONE DENSITY AXIAL: CPT | Performed by: INTERNAL MEDICINE

## 2018-10-03 RX ORDER — PRAVASTATIN SODIUM 10 MG
10 TABLET ORAL DAILY
Qty: 30 TABLET | Refills: 3 | Status: SHIPPED | OUTPATIENT
Start: 2018-10-03 | End: 2018-11-06

## 2018-10-03 NOTE — NURSING NOTE
"Chief Complaint   Patient presents with     Hypertension     follow up     initial /82  Pulse 74  Temp 97.9  F (36.6  C) (Oral)  Resp 16  Ht 5' 4.5\" (1.638 m)  Wt 138 lb (62.6 kg)  SpO2 96%  BMI 23.32 kg/m2 Estimated body mass index is 23.32 kg/(m^2) as calculated from the following:    Height as of this encounter: 5' 4.5\" (1.638 m).    Weight as of this encounter: 138 lb (62.6 kg)..  bp completed using cuff size regular  SACHIN AGUIRRE LPN  "

## 2018-10-03 NOTE — MR AVS SNAPSHOT
"              After Visit Summary   10/3/2018    Emma Jenkins    MRN: [id number]           Patient Information     Date Of Birth          1945        Visit Information        Provider Department      10/3/2018 9:00 AM Gene Scott MD [facility]        Today's Diagnoses     Elevated blood pressure reading without diagnosis of hypertension    -  1    Hyperlipidemia LDL goal <130        Anxiety          Care Instructions    Blood pressure looks fine today.     Statistically, you might benefit from some type of statin medication to reduce risk of future stroke or heart attack.   There is an alternative \"statin\" medication that is less likely than the others to cause muscle aches, called \"pravastatin\". I will often start patients on the lowest dose (10 mg), and even have them take it once or twice a week.   If they don't get muscle aches, we could try to gradually increase to once a day.     See me back in 6-12 months.               Follow-ups after your visit        Follow-up notes from your care team     Return in about 1 year (around 10/3/2019) for Routine Visit.      Your next 10 appointments already scheduled     Nov 09, 2018   Procedure with Nallely Alaniz MD   [facility] [place] [facility] (--)    [address] [facility] [id number] [phone]              Future tests that were ordered for you today     Open Future Orders        Priority Expected Expires Ordered    Comprehensive metabolic panel Routine  10/3/2019 10/3/2018    Lipid panel reflex to direct LDL Fasting Routine  10/3/2019 10/3/2018            Who to contact     If you have questions or need follow up information about today's clinic visit or your schedule please contact [facility] directly at [phone].  Normal or non-critical lab and imaging results will be communicated to you by MyChart, letter or phone within 4 business days after the clinic has received the results. If you do " "not hear from us within 7 days, please contact the clinic through Bandwagon or phone. If you have a critical or abnormal lab result, we will notify you by phone as soon as possible.  Submit refill requests through Bandwagon or call your pharmacy and they will forward the refill request to us. Please allow 3 business days for your refill to be completed.          Additional Information About Your Visit        LibriLoophart Information     Bandwagon gives you secure access to your electronic health record. If you see a primary care provider, you can also send messages to your care team and make appointments. If you have questions, please call your primary care clinic.  If you do not have a primary care provider, please call [phone] and they will assist you.        Care EveryWhere ID     This is your Care EveryWhere ID. This could be used by other organizations to access your [facility] medical records  [id number]        Your Vitals Were     Pulse Temperature Respirations Height Pulse Oximetry BMI (Body Mass Index)    74 97.9  F (36.6  C) (Oral) 16 5' 4.5\" (1.638 m) 96% 23.32 kg/m2       Blood Pressure from Last 3 Encounters:   10/03/18 120/82   08/13/18 (!) 138/94   07/25/18 139/82    Weight from Last 3 Encounters:   10/03/18 138 lb (62.6 kg)   08/13/18 142 lb 3.2 oz (64.5 kg)   07/25/18 140 lb (63.5 kg)               Primary Care Provider Office Phone # Fax #    Gene Scott [phone]       [address] [facility] [address]  [facility] [address]        Equal Access to Services     [facility]: Hadii aad ku hadasho Soomaali, waaxda luqadaha, qaybta kaalmada kem, raúl grant . So [facility] [phone].    ATENCIÓN: Si habla español, tiene a monzon disposición servicios gratuitos de asistencia lingüística. Llame al [phone].    We comply with applicable federal civil rights laws and [place] laws. We do not discriminate on the basis of race, color, national origin, age, disability, " sex, sexual orientation, or gender identity.            Thank you!     Thank you for choosing [facility]  for your care. Our goal is always to provide you with excellent care. Hearing back from our patients is one way we can continue to improve our services. Please take a few minutes to complete the written survey that you may receive in the mail after your visit with us. Thank you!             Your Updated Medication List - Protect others around you: Learn how to safely use, store and throw away your medicines at www.disposemymeds.org.          This list is accurate as of 10/3/18  9:43 AM.  Always use your most recent med list.                   Brand Name Dispense Instructions for use Diagnosis    acyclovir 400 MG tablet    ZOVIRAX    15 tablet    TAKE ONE TABLET BY MOUTH THREE TIMES A DAY    Recurrent HSV (herpes simplex virus)       aspirin 81 MG tablet     30 tablet    Take 1 tablet (81 mg) by mouth daily    Hyperlipidemia LDL goal <130       calcium carbonate 500 mg-vitamin D 200 units 500-200 MG-UNIT per tablet    OSCAL with D;OYSTER SHELL CALCIUM     Take 1 tablet by mouth every morning        citalopram 40 MG tablet    celeXA     Take 20 mg by mouth daily        etanercept 50 MG/ML injection    ENBREL     Inject 50 mg Subcutaneous once a week On Saturdays        GARLIC PO      Take 1 capsule by mouth Patient takes 4 capsules daily. Dose unknown.    Medicare annual wellness visit, subsequent       leflunomide 10 MG tablet    ARAVA     Take 20 mg by mouth every other day        metoprolol tartrate 50 MG tablet    LOPRESSOR    135 tablet    TAKE ONE-HALF TABLET BY MOUTH EVERY MORNING AND TAKE ONE TABLET BY MOUTH EVERY NIGHT AT BEDTIME    Tachycardia       multivitamin, therapeutic Tabs tablet      Take 1 tablet by mouth daily        OMEGA-3 FISH OIL PO      Take 1 g by mouth daily        omeprazole 40 MG capsule    priLOSEC    90 capsule    TAKE ONE CAPSULE BY MOUTH EVERY DAY . TAKE 30-60  MINUTES BEFORE A MEAL    Gastroesophageal reflux disease without esophagitis       STATIN NOT PRESCRIBED (INTENTIONAL)      Please choose reason not prescribed, below    Hyperlipidemia LDL goal <130       traZODone 75 mg Tabs half-tab    DESYREL     Take 50 mg by mouth At Bedtime        VITAMIN D3 PO      Take 2,500 Units by mouth daily

## 2018-10-03 NOTE — PROGRESS NOTES
.  SUBJECTIVE:   Emma Jenkins is a 72 year old female who presents to clinic today for the following health issues:    Hypertension Follow-up      Outpatient blood pressures are being checked at other Dr appointments.  Results are 130/80.    Low Salt Diet: no added salt    Amount of exercise or physical activity: None currently but usually walks    Problems taking medications regularly: No    Medication side effects: none    Diet: regular (no restrictions)    BP Readings from Last 3 Encounters:   10/03/18 120/82   08/13/18 (!) 138/94   07/25/18 139/82     Patient checks BP at home and states that home readings average 130/80. She has been on metoprolol for many years for the a-fib. Second BP reading was 124/80.     Hyperlipidemia    Recent Labs   Lab Test  07/25/18   0908  10/26/17   0958   CHOL  218*  251*   HDL  47*  51   LDL  153*  177*   TRIG  88  117     Patient wishes to have her cholesterol rechecked because she has lost weight. She is not currently on a statin and wonders if she can remain off of a statin. She is not taking a baby aspirin daily. Notes that she is now 138 lbs, previously 158 lbs. She changed her diet and increased activity levels for weight loss.     Depression and anxiety    PHQ-9 SCORE 10/17/2016 7/18/2017 7/25/2018   Total Score 0 0 0     Still taking celexa 20 mg daily. She is no longer seeing her psychiatrist but is seeing someone online who works in [place]. They prescribe her citalopram. She also takes trazodone at bedtime.     Problem list and histories reviewed & adjusted, as indicated.  Additional history: as documented    Reviewed and updated as needed this visit by clinical staff  Tobacco  Med Hx  Surg Hx  Fam Hx  Soc Hx      Reviewed and updated as needed this visit by Provider         ROS:  No dyspnea or cough. No chest discomfort, dizziness or palpitations. No diarrhea, abdominal pain or rectal bleeding.   No acute problems with vision or speech, lateralizing weakness  "or paresthesias.    ROS: as above or negative for Respiratory, CV, psychiatric, neuro systems.    This document serves as a record of the services and decisions personally performed and made by Gene Scott MD. It was created on his behalf by Kelly Busch, a trained medical scribe. The creation of this document is based on the provider's statements to the medical scribe.  Kelly Busch October 3, 2018 9:02 AM     OBJECTIVE:     /82  Pulse 74  Temp 97.9  F (36.6  C) (Oral)  Resp 16  Ht 1.638 m (5' 4.5\")  Wt 62.6 kg (138 lb)  SpO2 96%  BMI 23.32 kg/m2  Body mass index is 23.32 kg/(m^2).     GENERAL: healthy, alert and no distress  RESP: lungs clear to auscultation - no rales, rhonchi or wheezes  CV: regular rate and rhythm, normal S1 S2, no S3 or S4, no murmur, click or rub, no peripheral edema and peripheral pulses strong  MS: no gross musculoskeletal defects noted, no edema  SKIN: no suspicious lesions or rashes  NEURO: Normal strength and tone, mentation intact and speech normal  PSYCH: mentation appears normal, affect normal/bright    Diagnostic Test Results:  none     ASSESSMENT/PLAN:   (R03.0) Elevated blood pressure reading without diagnosis of hypertension  (primary encounter diagnosis)  Comment: BP at target. Continue current measures    (E78.5) Hyperlipidemia LDL goal <130  Comment:  Reviewed cardiac risk score. Discussed that it is recommended to start a statin, but patient prefers to avoid doing so due to the possibility of muscle aches. I do not believe it is necessary to start a statin at the moment. If interested in starting a statin in the future, may consider starting pravastatin at a low dose. Do not recommend working on further weight loss, instead just continue with regular exercise.   Plan: Comprehensive metabolic panel, Lipid panel         reflex to direct LDL Fasting, pravastatin         (PRAVACHOL) 10 MG tablet                (F41.9) Anxiety  Comment: Stable. Continue current " "meds.     FUTURE APPOINTMENTS:       - Follow-up visit in 6-12 months    Patient Instructions   Blood pressure looks fine today.     Statistically, you might benefit from some type of statin medication to reduce risk of future stroke or heart attack.   There is an alternative \"statin\" medication that is less likely than the others to cause muscle aches, called \"pravastatin\". I will often start patients on the lowest dose (10 mg), and even have them take it once or twice a week.   If they don't get muscle aches, we could try to gradually increase to once a day.     See me back in 6-12 months.           The information in this document, created by the medical scribe for me, accurately reflects the services I personally performed and the decisions made by me. I have reviewed and approved this document for accuracy prior to leaving the patient care area.  October 3, 2018 9:02 AM    Gene Scott MD  [facility]    "

## 2018-10-03 NOTE — PATIENT INSTRUCTIONS
"Blood pressure looks fine today.     Statistically, you might benefit from some type of statin medication to reduce risk of future stroke or heart attack.   There is an alternative \"statin\" medication that is less likely than the others to cause muscle aches, called \"pravastatin\". I will often start patients on the lowest dose (10 mg), and even have them take it once or twice a week.   If they don't get muscle aches, we could try to gradually increase to once a day.     See me back in 6-12 months.       "

## 2018-11-01 ENCOUNTER — TELEPHONE (OUTPATIENT)
Dept: INTERNAL MEDICINE | Facility: CLINIC | Age: 73
End: 2018-11-01

## 2018-11-01 DIAGNOSIS — M85.80 OSTEOPENIA, UNSPECIFIED LOCATION: Primary | ICD-10-CM

## 2018-11-01 NOTE — TELEPHONE ENCOUNTER
Reason for Call:  Request for results:    Name of test or procedure: Dexa    Date of test of procedure: 10/03/18    Location of the test or procedure: here    OK to leave the result message on voice mail or with a family member? YES    Phone number Patient can be reached at:  Home number on file [phone] (home)    Additional comments: none    Call taken on 11/1/2018 at 12:42 PM by Shirlene Rubin

## 2018-11-01 NOTE — TELEPHONE ENCOUNTER
Results showed osteopenia, not quite into the range of osteoporosis.     However, the physician interpreting the study did recommend that we start alendronate or Fosamax.    She would need to take Fosamax once a week, first thing in the early morning before eating or drinking. Take Fosamax with a full 8 oz glass of water on empty stomach. Maintain sitting or standing posture and no other food or liquid intake for 30 minutes afterward.    Additional recommendations include ensuring adequate Calcium (1200 mg per day) and Vitamin D (2000 IU per day).     If she agrees, I can fax a prescription to her pharmacy.   If she would rather discuss this more before taking this new medication, she could schedule an office appointment.     Please advise pt.

## 2018-11-02 RX ORDER — ALENDRONATE SODIUM 70 MG/1
TABLET ORAL
Qty: 12 TABLET | Refills: 1 | Status: SHIPPED | OUTPATIENT
Start: 2018-11-02 | End: 2019-05-13

## 2018-11-06 ENCOUNTER — TELEPHONE (OUTPATIENT)
Dept: INTERNAL MEDICINE | Facility: CLINIC | Age: 73
End: 2018-11-06

## 2018-11-06 ENCOUNTER — OFFICE VISIT (OUTPATIENT)
Dept: INTERNAL MEDICINE | Facility: CLINIC | Age: 73
End: 2018-11-06
Payer: COMMERCIAL

## 2018-11-06 VITALS
HEIGHT: 65 IN | BODY MASS INDEX: 23.49 KG/M2 | WEIGHT: 141 LBS | RESPIRATION RATE: 16 BRPM | DIASTOLIC BLOOD PRESSURE: 84 MMHG | HEART RATE: 83 BPM | SYSTOLIC BLOOD PRESSURE: 116 MMHG | TEMPERATURE: 98.3 F | OXYGEN SATURATION: 96 %

## 2018-11-06 DIAGNOSIS — Z01.818 PREOP GENERAL PHYSICAL EXAM: Primary | ICD-10-CM

## 2018-11-06 LAB
ERYTHROCYTE [DISTWIDTH] IN BLOOD BY AUTOMATED COUNT: 13.8 % (ref 10–15)
HCT VFR BLD AUTO: 40.3 % (ref 35–47)
HGB BLD-MCNC: 13.2 G/DL (ref 11.7–15.7)
MCH RBC QN AUTO: 29.3 PG (ref 26.5–33)
MCHC RBC AUTO-ENTMCNC: 32.8 G/DL (ref 31.5–36.5)
MCV RBC AUTO: 89 FL (ref 78–100)
PLATELET # BLD AUTO: 220 10E9/L (ref 150–450)
RBC # BLD AUTO: 4.51 10E12/L (ref 3.8–5.2)
WBC # BLD AUTO: 7.2 10E9/L (ref 4–11)

## 2018-11-06 PROCEDURE — 36415 COLL VENOUS BLD VENIPUNCTURE: CPT | Performed by: NURSE PRACTITIONER

## 2018-11-06 PROCEDURE — 85027 COMPLETE CBC AUTOMATED: CPT | Performed by: NURSE PRACTITIONER

## 2018-11-06 PROCEDURE — 99214 OFFICE O/P EST MOD 30 MIN: CPT | Performed by: NURSE PRACTITIONER

## 2018-11-06 PROCEDURE — 93000 ELECTROCARDIOGRAM COMPLETE: CPT | Performed by: NURSE PRACTITIONER

## 2018-11-06 PROCEDURE — 80048 BASIC METABOLIC PNL TOTAL CA: CPT | Performed by: NURSE PRACTITIONER

## 2018-11-06 NOTE — MR AVS SNAPSHOT
After Visit Summary   11/6/2018    Emma Jenkins    MRN: [id number]           Patient Information     Date Of Birth          1945        Visit Information        Provider Department      11/6/2018 8:20 AM Louise Hernandez NP [facility]        Today's Diagnoses     Preop general physical exam    -  1      Care Instructions      Before Your Surgery      Call your surgeon if there is any change in your health. This includes signs of a cold or flu (such as a sore throat, runny nose, cough, rash or fever).    Do not smoke, drink alcohol or take over the counter medicine (unless your surgeon or primary care doctor tells you to) for the 24 hours before and after surgery.    If you take prescribed drugs: Follow your doctor s orders about which medicines to take and which to stop until after surgery.    Eating and drinking prior to surgery: follow the instructions from your surgeon    Take a shower or bath the night before surgery. Use the soap your surgeon gave you to gently clean your skin. If you do not have soap from your surgeon, use your regular soap. Do not shave or scrub the surgery site.  Wear clean pajamas and have clean sheets on your bed.           Follow-ups after your visit        Your next 10 appointments already scheduled     Nov 09, 2018   Procedure with Nallely Alaniz MD   [facility] (--)    [address] [facility] [address] [phone]              Who to contact     If you have questions or need follow up information about today's clinic visit or your schedule please contact [facility] directly at [phone].  Normal or non-critical lab and imaging results will be communicated to you by MyChart, letter or phone within 4 business days after the clinic has received the results. If you do not hear from us within 7 days, please contact the clinic through MyChart or phone. If you have a critical or abnormal  "lab result, we will notify you by phone as soon as possible.  Submit refill requests through [email or web address] or call your pharmacy and they will forward the refill request to us. Please allow 3 business days for your refill to be completed.          Additional Information About Your Visit        OpenBookhart Information     [email or web address] gives you secure access to your electronic health record. If you see a primary care provider, you can also send messages to your care team and make appointments. If you have questions, please call your primary care clinic.  If you do not have a primary care provider, please call [phone] and they will assist you.        Care EveryWhere ID     This is your Care EveryWhere ID. This could be used by other organizations to access your [facility] medical records  [id number]        Your Vitals Were     Pulse Temperature Respirations Height Pulse Oximetry BMI (Body Mass Index)    83 98.3  F (36.8  C) (Oral) 16 5' 4.5\" (1.638 m) 96% 23.83 kg/m2       Blood Pressure from Last 3 Encounters:   11/06/18 116/84   10/03/18 120/82   08/13/18 (!) 138/94    Weight from Last 3 Encounters:   11/06/18 141 lb (64 kg)   10/03/18 138 lb (62.6 kg)   08/13/18 142 lb 3.2 oz (64.5 kg)              We Performed the Following     Basic metabolic panel     CBC with platelets        Primary Care Provider Office Phone # Fax #    Gene Scott [phone]       [address] [facility] [address]  [place] [id number]        Equal Access to Services     [facility]: Hadii aad ku hadasho Soomaali, waaxda luqadaha, qaybta kaalmada adeegyada, waxuvaldo grant . So [place] [phone].    ATENCIÓN: Si habla español, tiene a monzon disposición servicios gratuitos de asistencia lingüística. Llame al [phone].    We comply with applicable federal civil rights laws and [place] laws. We do not discriminate on the basis of race, color, national origin, age, disability, sex, sexual orientation, or gender " identity.            Thank you!     Thank you for choosing [facility]  for your care. Our goal is always to provide you with excellent care. Hearing back from our patients is one way we can continue to improve our services. Please take a few minutes to complete the written survey that you may receive in the mail after your visit with us. Thank you!             Your Updated Medication List - Protect others around you: Learn how to safely use, store and throw away your medicines at www.disposemymeds.org.          This list is accurate as of 11/6/18  9:26 AM.  Always use your most recent med list.                   Brand Name Dispense Instructions for use Diagnosis    acyclovir 400 MG tablet    ZOVIRAX    15 tablet    TAKE ONE TABLET BY MOUTH THREE TIMES A DAY    Recurrent HSV (herpes simplex virus)       alendronate 70 MG tablet    FOSAMAX    12 tablet    Take 1 tablet (70 mg) by mouth with 8oz water every 7 days 30 minutes before breakfast and remain upright during this time.    Osteopenia, unspecified location       aspirin 81 MG tablet     30 tablet    Take 1 tablet (81 mg) by mouth daily    Hyperlipidemia LDL goal <130       calcium carbonate 500 mg-vitamin D 200 units 500-200 MG-UNIT per tablet    OSCAL with D;OYSTER SHELL CALCIUM     Take 1 tablet by mouth every morning        citalopram 40 MG tablet    celeXA     Take 20 mg by mouth daily        etanercept 50 MG/ML injection    ENBREL     Inject 50 mg Subcutaneous once a week On Saturdays        GARLIC PO      Take 1 capsule by mouth Patient takes 4 capsules daily. Dose unknown.    Medicare annual wellness visit, subsequent       leflunomide 10 MG tablet    ARAVA     Take 20 mg by mouth every other day        metoprolol tartrate 50 MG tablet    LOPRESSOR    135 tablet    TAKE ONE-HALF TABLET BY MOUTH EVERY MORNING AND TAKE ONE TABLET BY MOUTH EVERY NIGHT AT BEDTIME    Tachycardia       multivitamin, therapeutic Tabs tablet      Take 1 tablet by  mouth daily        OMEGA-3 FISH OIL PO      Take 1 g by mouth daily        omeprazole 40 MG capsule    priLOSEC    90 capsule    TAKE ONE CAPSULE BY MOUTH EVERY DAY . TAKE 30-60 MINUTES BEFORE A MEAL    Gastroesophageal reflux disease without esophagitis       traZODone 75 mg Tabs half-tab    DESYREL     Take 50 mg by mouth At Bedtime        VITAMIN D3 PO      Take 2,500 Units by mouth daily

## 2018-11-06 NOTE — PROGRESS NOTES
[place] [address]  [facility] [address]  [phone]  Dept: [phone]    PRE-OP EVALUATION:  Today's date: 2018    Emma Jenkins (: 1945) presents for pre-operative evaluation assessment as requested by Dr. Alaniz.  She requires evaluation and anesthesia risk assessment prior to undergoing surgery/procedure for treatment of colonoscopy .    Fax number for surgical facility: [facility]  Primary Physician: Gene Scott  Type of Anesthesia Anticipated: General    Patient has a Health Care Directive or Living Will:  NO    Preop Questions 2018   What are you having done? colonoscopy   Date of Surgery/Procedure: 2018   Facility or Hospital where procedure/surgery will be performed: [facility]   1.  Do you have a history of Heart attack, stroke, stent, coronary bypass surgery, or other heart surgery? No   2.  Do you ever have any pain or discomfort in your chest? No   3.  Do you have a history of  Heart Failure? No   4.   Are you troubled by shortness of breath when:  walking on a level surface, or up a slight hill, or at night? No   5.  Do you currently have a cold, bronchitis or other respiratory infection? No   6.  Do you have a cough, shortness of breath, or wheezing? No   7.  Do you sometimes get pains in the calves of your legs when you walk? No   8. Do you or anyone in your family have previous history of blood clots? No   9.  Do you or does anyone in your family have a serious bleeding problem such as prolonged bleeding following surgeries or cuts? No   10. Have you ever had problems with anemia or been told to take iron pills? YES -    11. Have you had any abnormal blood loss such as black, tarry or bloody stools, or abnormal vaginal bleeding? No   12. Have you ever had a blood transfusion? No   13. Have you or any of your relatives ever had problems with anesthesia? No   14. Do you have sleep apnea, excessive snoring or  daytime drowsiness? No   15. Do you have any prosthetic heart valves? No   16. Do you have prosthetic joints? No   17. Is there any chance that you may be pregnant? No         HPI:     HPI related to upcoming procedure: colonoscopy      See problem list for active medical problems.  Problems all longstanding and stable, except as noted/documented.  See ROS for pertinent symptoms related to these conditions.                                                                                                                                                          .    MEDICAL HISTORY:     Patient Active Problem List    Diagnosis Date Noted     Anxiety 10/03/2018     Priority: Medium     Cervicalgia 09/10/2018     Priority: Medium     Major depressive disorder, recurrent episode, in full remission (H) 07/25/2018     Priority: Medium     Seropositive rheumatoid arthritis of multiple sites (H) 10/17/2016     Priority: Medium      Past Medical History:   Diagnosis Date     Anxiety and depression      Arthritis     rheumatoid arthritis     GERD (gastroesophageal reflux disease)      Heart arrhythmias      Past Surgical History:   Procedure Laterality Date     APPENDECTOMY       BLEPHAROPLASTY BILATERAL Bilateral 1/9/2017    Procedure: BLEPHAROPLASTY BILATERAL;  Surgeon: Ismael Holt MD;  [facility]:  [facility]     COLONOSCOPY  06/04/2013    Adenomatous polyps, repeat in 5 years     GYN SURGERY      hysterectomy     ORTHOPEDIC SURGERY       REMOVE HARDWARE HAND  10/7/2011    Procedure:REMOVE HARDWARE HAND; Right Index Finger K-Wire Removal ; Surgeon:KELSEY ALAS; Location: [facility]     Current Outpatient Prescriptions   Medication Sig Dispense Refill     acyclovir (ZOVIRAX) 400 MG tablet TAKE ONE TABLET BY MOUTH THREE TIMES A DAY 15 tablet 5     aspirin 81 MG tablet Take 1 tablet (81 mg) by mouth daily 30 tablet 0     calcium carb 1250 mg, 500 mg [place],/vitamin D 200 units (OSCAL WITH D) 500-200 MG-UNIT per tablet Take  1 tablet by mouth every morning        Cholecalciferol (VITAMIN D3 PO) Take 2,500 Units by mouth daily        citalopram (CELEXA) 40 MG tablet Take 20 mg by mouth daily        etanercept (ENBREL) 50 MG/ML injection Inject 50 mg Subcutaneous once a week On Saturdays       GARLIC PO Take 1 capsule by mouth Patient takes 4 capsules daily. Dose unknown.       leflunomide (ARAVA) 10 MG tablet Take 20 mg by mouth every other day        metoprolol tartrate (LOPRESSOR) 50 MG tablet TAKE ONE-HALF TABLET BY MOUTH EVERY MORNING AND TAKE ONE TABLET BY MOUTH EVERY NIGHT AT BEDTIME 135 tablet 0     multivitamin, therapeutic (THERA-VIT) TABS Take 1 tablet by mouth daily       Omega-3 Fatty Acids (OMEGA-3 FISH OIL PO) Take 1 g by mouth daily        omeprazole (PRILOSEC) 40 MG capsule TAKE ONE CAPSULE BY MOUTH EVERY DAY . TAKE 30-60 MINUTES BEFORE A MEAL 90 capsule 3     TRAZodone (DESYREL) 75 MG TABS Take 50 mg by mouth At Bedtime        alendronate (FOSAMAX) 70 MG tablet Take 1 tablet (70 mg) by mouth with 8oz water every 7 days 30 minutes before breakfast and remain upright during this time. (Patient not taking: Reported on 11/6/2018) 12 tablet 1     OTC products: None, except as noted above    No Known Allergies   Latex Allergy: NO    Social History   Substance Use Topics     Smoking status: Former Smoker     Years: 10.00     Smokeless tobacco: Never Used     Alcohol use 2.5 oz/week     5 Glasses of wine per week      Comment: rare     History   Drug Use No       REVIEW OF SYSTEMS:   CONSTITUTIONAL: NEGATIVE for fever, chills, change in weight  ENT/MOUTH: NEGATIVE for ear, mouth and throat problems  RESP: NEGATIVE for significant cough or SOB  CV: NEGATIVE for chest pain, palpitations or peripheral edema  GI: NEGATIVE for nausea, abdominal pain, heartburn, or change in bowel habits  : NEGATIVE for frequency, dysuria, or hematuria  MUSCULOSKELETAL: NEGATIVE for significant arthralgias or myalgia  NEURO: NEGATIVE for weakness,  "dizziness or paresthesias  ENDOCRINE: NEGATIVE for temperature intolerance, skin/hair changes  HEME: NEGATIVE for bleeding problems  PSYCHIATRIC: NEGATIVE for changes in mood or affect    EXAM:   /84 (BP Location: Right arm, Patient Position: Sitting, Cuff Size: Adult Large)  Pulse 83  Temp 98.3  F (36.8  C) (Oral)  Resp 16  Ht 5' 4.5\" (1.638 m)  Wt 141 lb (64 kg)  SpO2 96%  BMI 23.83 kg/m2    GENERAL APPEARANCE: healthy, alert and no distress     NECK: no adenopathy, no asymmetry, masses, or scars and thyroid normal to palpation     RESP: lungs clear to auscultation - no rales, rhonchi or wheezes     CV: regular rates and rhythm, normal S1 S2, no S3 or S4 and no murmur, click or rub     ABDOMEN:  soft, nontender, no HSM or masses and bowel sounds normal     MS: extremities normal- no gross deformities noted, no evidence of inflammation in joints, FROM in all extremities.     NEURO: Normal strength and tone, sensory exam grossly normal, mentation intact and speech normal     PSYCH: mentation appears normal. and affect normal/bright    DIAGNOSTICS:       Recent Labs   Lab Test  07/25/18   0911 06/07/18   10/26/17   0958   07/18/17   1029   HGB  13.0   --    --    --    --   13.1   PLT  243   --    --    --    --   251   NA  134   --    --   139   --   138   POTASSIUM  4.4   --    --   4.7   --   4.4   CR  0.70  0.720   < >  0.69   < >  0.71    < > = values in this interval not displayed.        IMPRESSION:   Reason for surgery/procedure: colonoscopy    The proposed surgical procedure is considered LOW risk.    REVISED CARDIAC RISK INDEX  The patient has the following serious cardiovascular risks for perioperative complications such as (MI, PE, VFib and 3  AV Block):  No serious cardiac risks  INTERPRETATION: 0 risks: Class I (very low risk - 0.4% complication rate)    The patient has the following additional risks for perioperative complications:  No identified additional risks      ICD-10-CM    1. Preop " general physical exam Z01.818        RECOMMENDATIONS:     --Consult hospital rounder / IM to assist post-op medical management    --Patient is to take all scheduled medications on the day of surgery EXCEPT for modifications listed below.    APPROVAL GIVEN to proceed with proposed procedure, without further diagnostic evaluation       Signed Electronically by: Louise Hernandez NP    Copy of this evaluation report is provided to requesting physician.    [place] Preop Guidelines    Revised Cardiac Risk Index

## 2018-11-06 NOTE — TELEPHONE ENCOUNTER
Pt calls back. She will return for labs and EKG.     Need order for EKG. She will ask for [place] at the front.

## 2018-11-06 NOTE — TELEPHONE ENCOUNTER
VM left for patient to call back, please relay message below to return to clinic.  DAVONTE STEWART CMA

## 2018-11-06 NOTE — TELEPHONE ENCOUNTER
Please notify pt that since colonoscopy is under general anesthesia, up to date EKG and labs need to be done.  Orders in lab and return to IM for EKG.  Sorry for inconvenience, general anesthesia not common for colonoscopy  Louise Hernandez CNP

## 2018-11-06 NOTE — LETTER
November 7, 2018      Emma Jenkins  [address] [place]  [place] [address]        Dear ,    We are writing to inform you of your test results.    Your recent lab results were normal.    Louise Hernandez CNP    Resulted Orders   CBC with platelets   Result Value Ref Range    WBC 7.2 4.0 - 11.0 10e9/L    RBC Count 4.51 3.8 - 5.2 10e12/L    Hemoglobin 13.2 11.7 - 15.7 g/dL    Hematocrit 40.3 35.0 - 47.0 %    MCV 89 78 - 100 fl    MCH 29.3 26.5 - 33.0 pg    MCHC 32.8 31.5 - 36.5 g/dL    RDW 13.8 10.0 - 15.0 %    Platelet Count 220 150 - 450 10e9/L   Basic metabolic panel   Result Value Ref Range    Sodium 133 133 - 144 mmol/L    Potassium 4.4 3.4 - 5.3 mmol/L    Chloride 98 94 - 109 mmol/L    Carbon Dioxide 26 20 - 32 mmol/L    Anion Gap 9 3 - 14 mmol/L    Glucose 86 70 - 99 mg/dL      Comment:      Non Fasting    Urea Nitrogen 20 7 - 30 mg/dL    Creatinine 0.74 0.52 - 1.04 mg/dL    GFR Estimate 77 >60 mL/min/1.7m2      Comment:      Non  GFR Calc    GFR Estimate If Black >90 >60 mL/min/1.7m2      Comment:       GFR Calc    Calcium 9.8 8.5 - 10.1 mg/dL       If you have any questions or concerns, please call the clinic at the number listed above.       Sincerely,        Louise Hernandez NP

## 2018-11-07 LAB
ANION GAP SERPL CALCULATED.3IONS-SCNC: 9 MMOL/L (ref 3–14)
BUN SERPL-MCNC: 20 MG/DL (ref 7–30)
CALCIUM SERPL-MCNC: 9.8 MG/DL (ref 8.5–10.1)
CHLORIDE SERPL-SCNC: 98 MMOL/L (ref 94–109)
CO2 SERPL-SCNC: 26 MMOL/L (ref 20–32)
CREAT SERPL-MCNC: 0.74 MG/DL (ref 0.52–1.04)
GFR SERPL CREATININE-BSD FRML MDRD: 77 ML/MIN/1.7M2
GLUCOSE SERPL-MCNC: 86 MG/DL (ref 70–99)
POTASSIUM SERPL-SCNC: 4.4 MMOL/L (ref 3.4–5.3)
SODIUM SERPL-SCNC: 133 MMOL/L (ref 133–144)

## 2018-11-08 NOTE — H&P (VIEW-ONLY)
[place] [address]  [facility] [address]  [phone]  Dept: [phone]    PRE-OP EVALUATION:  Today's date: 2018    Emma [place] (: 1945) presents for pre-operative evaluation assessment as requested by Dr. Alaniz.  She requires evaluation and anesthesia risk assessment prior to undergoing surgery/procedure for treatment of colonoscopy .    Fax number for surgical facility: [facility]  Primary Physician: Gene Scott  Type of Anesthesia Anticipated: General    Patient has a Health Care Directive or Living Will:  NO    Preop Questions 2018   What are you having done? colonoscopy   Date of Surgery/Procedure: 2018   Facility or Hospital where procedure/surgery will be performed: [facility]note 3[facility]   1.  Do you have a history of Heart attack, stroke, stent, coronary bypass surgery, or other heart surgery? No   2.  Do you ever have any pain or discomfort in your chest? No   3.  Do you have a history of  Heart Failure? No   4.   Are you troubled by shortness of breath when:  walking on a level surface, or up a slight hill, or at night? No   5.  Do you currently have a cold, bronchitis or other respiratory infection? No   6.  Do you have a cough, shortness of breath, or wheezing? No   7.  Do you sometimes get pains in the calves of your legs when you walk? No   8. Do you or anyone in your family have previous history of blood clots? No   9.  Do you or does anyone in your family have a serious bleeding problem such as prolonged bleeding following surgeries or cuts? No   10. Have you ever had problems with anemia or been told to take iron pills? YES -    11. Have you had any abnormal blood loss such as black, tarry or bloody stools, or abnormal vaginal bleeding? No   12. Have you ever had a blood transfusion? No   13. Have you or any of your relatives ever had problems with anesthesia? No   14. Do you have sleep apnea, excessive snoring or  daytime drowsiness? No   15. Do you have any prosthetic heart valves? No   16. Do you have prosthetic joints? No   17. Is there any chance that you may be pregnant? No         HPI:     HPI related to upcoming procedure: colonoscopy      See problem list for active medical problems.  Problems all longstanding and stable, except as noted/documented.  See ROS for pertinent symptoms related to these conditions.                                                                                                                                                          .    MEDICAL HISTORY:     Patient Active Problem List    Diagnosis Date Noted     Anxiety 10/03/2018     Priority: Medium     Cervicalgia 09/10/2018     Priority: Medium     Major depressive disorder, recurrent episode, in full remission (H) 07/25/2018     Priority: Medium     Seropositive rheumatoid arthritis of multiple sites (H) 10/17/2016     Priority: Medium      Past Medical History:   Diagnosis Date     Anxiety and depression      Arthritis     rheumatoid arthritis     GERD (gastroesophageal reflux disease)      Heart arrhythmias      Past Surgical History:   Procedure Laterality Date     APPENDECTOMY       BLEPHAROPLASTY BILATERAL Bilateral 1/9/2017    Procedure: BLEPHAROPLASTY BILATERAL;  Surgeon: Ismael Holt MD;  Location:  [facility]     COLONOSCOPY  06/04/2013    Adenomatous polyps, repeat in 5 years     GYN SURGERY      hysterectomy     ORTHOPEDIC SURGERY       REMOVE HARDWARE HAND  10/7/2011    Procedure:REMOVE HARDWARE HAND; Right Index Finger K-Wire Removal ; Surgeon:KELSEY ALAS; Location: [facility]     Current Outpatient Prescriptions   Medication Sig Dispense Refill     acyclovir (ZOVIRAX) 400 MG tablet TAKE ONE TABLET BY MOUTH THREE TIMES A DAY 15 tablet 5     aspirin 81 MG tablet Take 1 tablet (81 mg) by mouth daily 30 tablet 0     calcium carb 1250 mg, 500 mg [place],/vitamin D 200 units (OSCAL WITH D) 500-200 MG-UNIT per tablet Take  1 tablet by mouth every morning        Cholecalciferol (VITAMIN D3 PO) Take 2,500 Units by mouth daily        citalopram (CELEXA) 40 MG tablet Take 20 mg by mouth daily        etanercept (ENBREL) 50 MG/ML injection Inject 50 mg Subcutaneous once a week On Saturdays       GARLIC PO Take 1 capsule by mouth Patient takes 4 capsules daily. Dose unknown.       leflunomide (ARAVA) 10 MG tablet Take 20 mg by mouth every other day        metoprolol tartrate (LOPRESSOR) 50 MG tablet TAKE ONE-HALF TABLET BY MOUTH EVERY MORNING AND TAKE ONE TABLET BY MOUTH EVERY NIGHT AT BEDTIME 135 tablet 0     multivitamin, therapeutic (THERA-VIT) TABS Take 1 tablet by mouth daily       Omega-3 Fatty Acids (OMEGA-3 FISH OIL PO) Take 1 g by mouth daily        omeprazole (PRILOSEC) 40 MG capsule TAKE ONE CAPSULE BY MOUTH EVERY DAY . TAKE 30-60 MINUTES BEFORE A MEAL 90 capsule 3     TRAZodone (DESYREL) 75 MG TABS Take 50 mg by mouth At Bedtime        alendronate (FOSAMAX) 70 MG tablet Take 1 tablet (70 mg) by mouth with 8oz water every 7 days 30 minutes before breakfast and remain upright during this time. (Patient not taking: Reported on 11/6/2018) 12 tablet 1     OTC products: None, except as noted above    No Known Allergies   Latex Allergy: NO    Social History   Substance Use Topics     Smoking status: Former Smoker     Years: 10.00     Smokeless tobacco: Never Used     Alcohol use 2.5 oz/week     5 Glasses of wine per week      Comment: rare     History   Drug Use No       REVIEW OF SYSTEMS:   CONSTITUTIONAL: NEGATIVE for fever, chills, change in weight  ENT/MOUTH: NEGATIVE for ear, mouth and throat problems  RESP: NEGATIVE for significant cough or SOB  CV: NEGATIVE for chest pain, palpitations or peripheral edema  GI: NEGATIVE for nausea, abdominal pain, heartburn, or change in bowel habits  : NEGATIVE for frequency, dysuria, or hematuria  MUSCULOSKELETAL: NEGATIVE for significant arthralgias or myalgia  NEURO: NEGATIVE for weakness,  "dizziness or paresthesias  ENDOCRINE: NEGATIVE for temperature intolerance, skin/hair changes  HEME: NEGATIVE for bleeding problems  PSYCHIATRIC: NEGATIVE for changes in mood or affect    EXAM:   /84 (BP Location: Right arm, Patient Position: Sitting, Cuff Size: Adult Large)  Pulse 83  Temp 98.3  F (36.8  C) (Oral)  Resp 16  Ht 5' 4.5\" (1.638 m)  Wt 141 lb (64 kg)  SpO2 96%  BMI 23.83 kg/m2    GENERAL APPEARANCE: healthy, alert and no distress     NECK: no adenopathy, no asymmetry, masses, or scars and thyroid normal to palpation     RESP: lungs clear to auscultation - no rales, rhonchi or wheezes     CV: regular rates and rhythm, normal S1 S2, no S3 or S4 and no murmur, click or rub     ABDOMEN:  soft, nontender, no HSM or masses and bowel sounds normal     MS: extremities normal- no gross deformities noted, no evidence of inflammation in joints, FROM in all extremities.     NEURO: Normal strength and tone, sensory exam grossly normal, mentation intact and speech normal     PSYCH: mentation appears normal. and affect normal/bright    DIAGNOSTICS:       Recent Labs   Lab Test  07/25/18   0911 06/07/18   10/26/17   0958   07/18/17   1029   HGB  13.0   --    --    --    --   13.1   PLT  243   --    --    --    --   251   NA  134   --    --   139   --   138   POTASSIUM  4.4   --    --   4.7   --   4.4   CR  0.70  0.720   < >  0.69   < >  0.71    < > = values in this interval not displayed.        IMPRESSION:   Reason for surgery/procedure: colonoscopy    The proposed surgical procedure is considered LOW risk.    REVISED CARDIAC RISK INDEX  The patient has the following serious cardiovascular risks for perioperative complications such as (MI, PE, VFib and 3  AV Block):  No serious cardiac risks  INTERPRETATION: 0 risks: Class I (very low risk - 0.4% complication rate)    The patient has the following additional risks for perioperative complications:  No identified additional risks      ICD-10-CM    1. Preop " general physical exam Z01.818        RECOMMENDATIONS:     --Consult hospital rounder / IM to assist post-op medical management    --Patient is to take all scheduled medications on the day of surgery EXCEPT for modifications listed below.    APPROVAL GIVEN to proceed with proposed procedure, without further diagnostic evaluation       Signed Electronically by: Louise Hernandez NP    Copy of this evaluation report is provided to requesting physician.    [place] Preop Guidelines    Revised Cardiac Risk Index

## 2018-11-09 ENCOUNTER — HOSPITAL ENCOUNTER (OUTPATIENT)
Facility: CLINIC | Age: 73
Discharge: HOME | End: 2018-11-09
Attending: INTERNAL MEDICINE | Admitting: INTERNAL MEDICINE
Payer: MEDICARE

## 2018-11-09 ENCOUNTER — ANESTHESIA (OUTPATIENT)
Dept: SURGERY | Facility: CLINIC | Age: 73
End: 2018-11-09
Payer: MEDICARE

## 2018-11-09 ENCOUNTER — ANESTHESIA EVENT (OUTPATIENT)
Dept: SURGERY | Facility: CLINIC | Age: 73
End: 2018-11-09
Payer: MEDICARE

## 2018-11-09 VITALS
HEART RATE: 58 BPM | SYSTOLIC BLOOD PRESSURE: 106 MMHG | OXYGEN SATURATION: 99 % | HEIGHT: 64 IN | BODY MASS INDEX: 23.73 KG/M2 | RESPIRATION RATE: 18 BRPM | TEMPERATURE: 97.5 F | WEIGHT: 139 LBS | DIASTOLIC BLOOD PRESSURE: 69 MMHG

## 2018-11-09 LAB — COLONOSCOPY: NORMAL

## 2018-11-09 PROCEDURE — 36000050 ZZH SURGERY LEVEL 2 1ST 30 MIN: Performed by: INTERNAL MEDICINE

## 2018-11-09 PROCEDURE — 88305 TISSUE EXAM BY PATHOLOGIST: CPT | Mod: 26 | Performed by: INTERNAL MEDICINE

## 2018-11-09 PROCEDURE — 71000027 ZZH RECOVERY PHASE 2 EACH 15 MINS: Performed by: INTERNAL MEDICINE

## 2018-11-09 PROCEDURE — 88305 TISSUE EXAM BY PATHOLOGIST: CPT | Performed by: INTERNAL MEDICINE

## 2018-11-09 PROCEDURE — 25000128 H RX IP 250 OP 636: Performed by: NURSE ANESTHETIST, CERTIFIED REGISTERED

## 2018-11-09 PROCEDURE — 27210794 ZZH OR GENERAL SUPPLY STERILE: Performed by: INTERNAL MEDICINE

## 2018-11-09 PROCEDURE — 37000008 ZZH ANESTHESIA TECHNICAL FEE, 1ST 30 MIN: Performed by: INTERNAL MEDICINE

## 2018-11-09 PROCEDURE — 40000306 ZZH STATISTIC PRE PROC ASSESS II: Performed by: INTERNAL MEDICINE

## 2018-11-09 PROCEDURE — 25000125 ZZHC RX 250: Performed by: NURSE ANESTHETIST, CERTIFIED REGISTERED

## 2018-11-09 PROCEDURE — 36000052 ZZH SURGERY LEVEL 2 EA 15 ADDTL MIN: Performed by: INTERNAL MEDICINE

## 2018-11-09 PROCEDURE — 40000037 ZZH STATISTIC COLONOSCOPY (OR PROCEDURE): Performed by: INTERNAL MEDICINE

## 2018-11-09 PROCEDURE — 37000009 ZZH ANESTHESIA TECHNICAL FEE, EACH ADDTL 15 MIN: Performed by: INTERNAL MEDICINE

## 2018-11-09 RX ORDER — SODIUM CHLORIDE, SODIUM LACTATE, POTASSIUM CHLORIDE, CALCIUM CHLORIDE 600; 310; 30; 20 MG/100ML; MG/100ML; MG/100ML; MG/100ML
INJECTION, SOLUTION INTRAVENOUS CONTINUOUS
Status: DISCONTINUED | OUTPATIENT
Start: 2018-11-09 | End: 2018-11-09 | Stop reason: HOSPADM

## 2018-11-09 RX ORDER — PROPOFOL 10 MG/ML
INJECTION, EMULSION INTRAVENOUS PRN
Status: DISCONTINUED | OUTPATIENT
Start: 2018-11-09 | End: 2018-11-09

## 2018-11-09 RX ORDER — NALOXONE HYDROCHLORIDE 0.4 MG/ML
.1-.4 INJECTION, SOLUTION INTRAMUSCULAR; INTRAVENOUS; SUBCUTANEOUS
Status: DISCONTINUED | OUTPATIENT
Start: 2018-11-09 | End: 2018-11-09

## 2018-11-09 RX ORDER — FENTANYL CITRATE 50 UG/ML
25-50 INJECTION, SOLUTION INTRAMUSCULAR; INTRAVENOUS
Status: DISCONTINUED | OUTPATIENT
Start: 2018-11-09 | End: 2018-11-09 | Stop reason: HOSPADM

## 2018-11-09 RX ORDER — ONDANSETRON 4 MG/1
4 TABLET, ORALLY DISINTEGRATING ORAL EVERY 6 HOURS PRN
Status: DISCONTINUED | OUTPATIENT
Start: 2018-11-09 | End: 2018-11-09 | Stop reason: HOSPADM

## 2018-11-09 RX ORDER — SODIUM CHLORIDE, SODIUM LACTATE, POTASSIUM CHLORIDE, CALCIUM CHLORIDE 600; 310; 30; 20 MG/100ML; MG/100ML; MG/100ML; MG/100ML
INJECTION, SOLUTION INTRAVENOUS CONTINUOUS PRN
Status: DISCONTINUED | OUTPATIENT
Start: 2018-11-09 | End: 2018-11-09

## 2018-11-09 RX ORDER — HYDRALAZINE HYDROCHLORIDE 20 MG/ML
2.5-5 INJECTION INTRAMUSCULAR; INTRAVENOUS EVERY 10 MIN PRN
Status: DISCONTINUED | OUTPATIENT
Start: 2018-11-09 | End: 2018-11-09 | Stop reason: HOSPADM

## 2018-11-09 RX ORDER — NALOXONE HYDROCHLORIDE 0.4 MG/ML
.1-.4 INJECTION, SOLUTION INTRAMUSCULAR; INTRAVENOUS; SUBCUTANEOUS
Status: DISCONTINUED | OUTPATIENT
Start: 2018-11-09 | End: 2018-11-09 | Stop reason: HOSPADM

## 2018-11-09 RX ORDER — ONDANSETRON 2 MG/ML
4 INJECTION INTRAMUSCULAR; INTRAVENOUS
Status: DISCONTINUED | OUTPATIENT
Start: 2018-11-09 | End: 2018-11-09 | Stop reason: HOSPADM

## 2018-11-09 RX ORDER — MEPERIDINE HYDROCHLORIDE 25 MG/ML
12.5 INJECTION INTRAMUSCULAR; INTRAVENOUS; SUBCUTANEOUS
Status: DISCONTINUED | OUTPATIENT
Start: 2018-11-09 | End: 2018-11-09 | Stop reason: HOSPADM

## 2018-11-09 RX ORDER — ONDANSETRON 4 MG/1
4 TABLET, ORALLY DISINTEGRATING ORAL EVERY 30 MIN PRN
Status: DISCONTINUED | OUTPATIENT
Start: 2018-11-09 | End: 2018-11-09 | Stop reason: HOSPADM

## 2018-11-09 RX ORDER — DEXAMETHASONE SODIUM PHOSPHATE 4 MG/ML
4 INJECTION, SOLUTION INTRA-ARTICULAR; INTRALESIONAL; INTRAMUSCULAR; INTRAVENOUS; SOFT TISSUE EVERY 10 MIN PRN
Status: DISCONTINUED | OUTPATIENT
Start: 2018-11-09 | End: 2018-11-09 | Stop reason: HOSPADM

## 2018-11-09 RX ORDER — ONDANSETRON 2 MG/ML
4 INJECTION INTRAMUSCULAR; INTRAVENOUS EVERY 30 MIN PRN
Status: DISCONTINUED | OUTPATIENT
Start: 2018-11-09 | End: 2018-11-09 | Stop reason: HOSPADM

## 2018-11-09 RX ORDER — POLYETHYLENE GLYCOL 3350 17 G/17G
1 POWDER, FOR SOLUTION ORAL DAILY
COMMUNITY

## 2018-11-09 RX ORDER — PROPOFOL 10 MG/ML
INJECTION, EMULSION INTRAVENOUS CONTINUOUS PRN
Status: DISCONTINUED | OUTPATIENT
Start: 2018-11-09 | End: 2018-11-09

## 2018-11-09 RX ORDER — FLUMAZENIL 0.1 MG/ML
0.2 INJECTION, SOLUTION INTRAVENOUS
Status: DISCONTINUED | OUTPATIENT
Start: 2018-11-09 | End: 2018-11-09 | Stop reason: HOSPADM

## 2018-11-09 RX ORDER — ONDANSETRON 2 MG/ML
INJECTION INTRAMUSCULAR; INTRAVENOUS PRN
Status: DISCONTINUED | OUTPATIENT
Start: 2018-11-09 | End: 2018-11-09

## 2018-11-09 RX ORDER — FENTANYL CITRATE 50 UG/ML
INJECTION, SOLUTION INTRAMUSCULAR; INTRAVENOUS PRN
Status: DISCONTINUED | OUTPATIENT
Start: 2018-11-09 | End: 2018-11-09

## 2018-11-09 RX ORDER — ONDANSETRON 2 MG/ML
4 INJECTION INTRAMUSCULAR; INTRAVENOUS EVERY 6 HOURS PRN
Status: DISCONTINUED | OUTPATIENT
Start: 2018-11-09 | End: 2018-11-09 | Stop reason: HOSPADM

## 2018-11-09 RX ORDER — LIDOCAINE 40 MG/G
CREAM TOPICAL
Status: DISCONTINUED | OUTPATIENT
Start: 2018-11-09 | End: 2018-11-09 | Stop reason: HOSPADM

## 2018-11-09 RX ADMIN — SODIUM CHLORIDE, POTASSIUM CHLORIDE, SODIUM LACTATE AND CALCIUM CHLORIDE: 600; 310; 30; 20 INJECTION, SOLUTION INTRAVENOUS at 08:19

## 2018-11-09 RX ADMIN — PROPOFOL 10 MG: 10 INJECTION, EMULSION INTRAVENOUS at 08:59

## 2018-11-09 RX ADMIN — FENTANYL CITRATE 50 MCG: 50 INJECTION, SOLUTION INTRAMUSCULAR; INTRAVENOUS at 08:23

## 2018-11-09 RX ADMIN — PROPOFOL 30 MG: 10 INJECTION, EMULSION INTRAVENOUS at 08:25

## 2018-11-09 RX ADMIN — MIDAZOLAM 1 MG: 1 INJECTION INTRAMUSCULAR; INTRAVENOUS at 08:20

## 2018-11-09 RX ADMIN — ONDANSETRON 4 MG: 2 INJECTION INTRAMUSCULAR; INTRAVENOUS at 08:26

## 2018-11-09 RX ADMIN — PROPOFOL 40 MCG/KG/MIN: 10 INJECTION, EMULSION INTRAVENOUS at 08:26

## 2018-11-09 RX ADMIN — SODIUM CHLORIDE, POTASSIUM CHLORIDE, SODIUM LACTATE AND CALCIUM CHLORIDE: 600; 310; 30; 20 INJECTION, SOLUTION INTRAVENOUS at 08:18

## 2018-11-09 NOTE — IP AVS SNAPSHOT
MRN:[id number]                      After Visit Summary   11/9/2018    Emma Jenkins    MRN: [id number]           Thank you!     Thank you for choosing [facility] for your care. Our goal is always to provide you with excellent care. Hearing back from our patients is one way we can continue to improve our services. Please take a few minutes to complete the written survey that you may receive in the mail after you visit. If you would like to speak to someone directly about your visit please contact Patient Relations at [phone]. Thank you!          Patient Information     Date Of Birth          1945        About your hospital stay     You were admitted on:  November 9, 2018 You last received care in the:  [facility]    You were discharged on:  November 9, 2018       Who to Call     For medical emergencies, please call 911.  For non-urgent questions about your medical care, please call your primary care provider or clinic, [phone]  For questions related to your surgery, please call your surgery clinic        Attending Provider     Provider Nallely Gambino MD Gastroenterology       Primary Care Provider Office Phone # Fax #    Gene Scott [phone]      Further instructions from your care team       SEDATION ADULT DISCHARGE INSTRUCTIONS   SPECIAL PRECAUTIONS FOR 24 HOURS AFTER SURGERY    IT IS NOT UNUSUAL TO FEEL LIGHT-HEADED OR FAINT, UP TO 24 HOURS AFTER SURGERY OR WHILE TAKING PAIN MEDICATION.  IF YOU HAVE THESE SYMPTOMS; SIT FOR A FEW MINUTES BEFORE STANDING AND HAVE SOMEONE ASSIST YOU WHEN YOU GET UP TO WALK OR USE THE BATHROOM.    YOU SHOULD REST AND RELAX FOR THE NEXT 24 HOURS AND YOU MUST MAKE ARRANGEMENTS TO HAVE SOMEONE STAY WITH YOU FOR AT LEAST 24 HOURS AFTER YOUR DISCHARGE.  AVOID HAZARDOUS AND STRENUOUS ACTIVITIES.  DO NOT MAKE IMPORTANT DECISIONS FOR 24 HOURS.    DO NOT DRIVE ANY VEHICLE OR OPERATE  MECHANICAL EQUIPMENT FOR 24 HOURS FOLLOWING THE END OF YOUR SURGERY.  EVEN THOUGH YOU MAY FEEL NORMAL, YOUR REACTIONS MAY BE AFFECTED BY THE MEDICATION YOU HAVE RECEIVED.    DO NOT DRINK ALCOHOLIC BEVERAGES FOR 24 HOURS FOLLOWING YOUR SURGERY.    DRINK CLEAR LIQUIDS (APPLE JUICE, GINGER ALE, 7-UP, BROTH, ETC.).  PROGRESS TO YOUR REGULAR DIET AS YOU FEEL ABLE.    YOU MAY HAVE A DRY MOUTH, A SORE THROAT, MUSCLES ACHES OR TROUBLE SLEEPING.  THESE SHOULD GO AWAY AFTER 24 HOURS.    CALL YOUR DOCTOR FOR ANY OF THE FOLLOWING:  SIGNS OF INFECTION (FEVER, GROWING TENDERNESS AT THE SURGERY SITE, A LARGE AMOUNT OF DRAINAGE OR BLEEDING, SEVERE PAIN, FOUL-SMELLING DRAINAGE, REDNESS OR SWELLING.    IT HAS BEEN OVER 8 TO 10 HOURS SINCE SURGERY AND YOU ARE STILL NOT ABLE TO URINATE (PASS WATER).         COLONOSCOPY DISCHARGE INSTRUCTIONS    You may not drive, use heavy equipment or consume alcohol for 24 hours because the drugs you were given may cause dizziness, drowsiness, forgetfulness and slower reaction time.    You may resume your regular diet and medications.    If you had a biopsy or polypectomy done, do not take aspirin, aleve (naproxen) or ibuprofen products for the next 10 days.  Tylenol (acetaminophen) is safe to take.    What to watch for:    Problems rarely occur after the exam.  It is important for you to be aware of the early signs of a possible complication.  Call your doctor immediately if you notice any of the followin.  Unusual or persistent abdominal pain (pain that does not move around like a gas pain).    2.  Passing bright red blood from your rectum.    3.  Black or bloody stools.    4.  Temperature above 100.6 degrees F (37.5 degrees C)    If you fell this has become a medical emergency please call 911.        Pending Results     No orders found from 2018 to 11/10/2018.            Admission Information     Date & Time Provider Department Dept. Phone    2018 Nallely Alaniz MD [place]  [facility] [phone]      Your Vitals Were     Blood Pressure Pulse Temperature Respirations Height Weight    108/63 56 97.5  F (36.4  C) (Temporal) 18 1.626 m (5' 4\") 63 kg (139 lb)    Pulse Oximetry BMI (Body Mass Index)                99% 23.86 kg/m2          vocaltap Information     vocaltap gives you secure access to your electronic health record. If you see a primary care provider, you can also send messages to your care team and make appointments. If you have questions, please call your primary care clinic.  If you do not have a primary care provider, please call [phone] and they will assist you.        Care EveryWhere ID     This is your Care EveryWhere ID. This could be used by other organizations to access your [place] medical records  [id number]        Equal Access to Services     LORI CABAN : Doug Easley, olvin maciel, raúl torres. So [facility] [phone].    ATENCIÓN: Si habla español, tiene a monzon disposición servicios gratuitos de asistencia lingüística. Arnie al [phone].    We comply with applicable federal civil rights laws and [place] laws. We do not discriminate on the basis of race, color, national origin, age, disability, sex, sexual orientation, or gender identity.               Review of your medicines      CONTINUE these medicines which have NOT CHANGED        Dose / Directions    acyclovir 400 MG tablet   Commonly known as:  ZOVIRAX   Used for:  Recurrent HSV (herpes simplex virus)        TAKE ONE TABLET BY MOUTH THREE TIMES A DAY   Quantity:  15 tablet   Refills:  5       alendronate 70 MG tablet   Commonly known as:  FOSAMAX   Used for:  Osteopenia, unspecified location        Take 1 tablet (70 mg) by mouth with 8oz water every 7 days 30 minutes before breakfast and remain upright during this time.   Quantity:  12 tablet   Refills:  1       aspirin 81 MG tablet   Used for:  Hyperlipidemia " LDL goal <130        Dose:  81 mg   Take 1 tablet (81 mg) by mouth daily   Quantity:  30 tablet   Refills:  0       calcium carbonate 500 mg-vitamin D 200 units 500-200 MG-UNIT per tablet   Commonly known as:  OSCAL with D;OYSTER SHELL CALCIUM        Dose:  1 tablet   Take 1 tablet by mouth every morning   Refills:  0       citalopram 40 MG tablet   Commonly known as:  celeXA        Dose:  20 mg   Take 20 mg by mouth daily   Refills:  0       etanercept 50 MG/ML injection   Commonly known as:  ENBREL        Dose:  50 mg   Inject 50 mg Subcutaneous once a week On Saturdays   Refills:  0       GARLIC PO   Used for:  Medicare annual wellness visit, subsequent        Dose:  1 capsule   Take 1 capsule by mouth Patient takes 4 capsules daily. Dose unknown.   Refills:  0       leflunomide 10 MG tablet   Commonly known as:  ARAVA        Dose:  20 mg   Take 20 mg by mouth every other day   Refills:  0       metoprolol tartrate 50 MG tablet   Commonly known as:  LOPRESSOR   Used for:  Tachycardia        TAKE ONE-HALF TABLET BY MOUTH EVERY MORNING AND TAKE ONE TABLET BY MOUTH EVERY NIGHT AT BEDTIME   Quantity:  135 tablet   Refills:  0       multivitamin, therapeutic Tabs tablet        Dose:  1 tablet   Take 1 tablet by mouth daily   Refills:  0       OMEGA-3 FISH OIL PO        Dose:  1 g   Take 1 g by mouth daily   Refills:  0       omeprazole 40 MG capsule   Commonly known as:  priLOSEC   Used for:  Gastroesophageal reflux disease without esophagitis        TAKE ONE CAPSULE BY MOUTH EVERY DAY . TAKE 30-60 MINUTES BEFORE A MEAL   Quantity:  90 capsule   Refills:  3       polyethylene glycol Packet   Commonly known as:  MIRALAX/GLYCOLAX        Dose:  1 packet   Take 1 packet by mouth daily   Refills:  0       traZODone 75 mg Tabs half-tab   Commonly known as:  DESYREL        Dose:  50 mg   Take 50 mg by mouth At Bedtime   Refills:  0       VITAMIN D3 PO        Dose:  2500 Units   Take 2,500 Units by mouth daily   Refills:  0                 Protect others around you: Learn how to safely use, store and throw away your medicines at www.disposemymeds.org.             Medication List: This is a list of all your medications and when to take them. Check marks below indicate your daily home schedule. Keep this list as a reference.      Medications           Morning Afternoon Evening Bedtime As Needed    acyclovir 400 MG tablet   Commonly known as:  ZOVIRAX   TAKE ONE TABLET BY MOUTH THREE TIMES A DAY                                alendronate 70 MG tablet   Commonly known as:  FOSAMAX   Take 1 tablet (70 mg) by mouth with 8oz water every 7 days 30 minutes before breakfast and remain upright during this time.                                aspirin 81 MG tablet   Take 1 tablet (81 mg) by mouth daily                                calcium carbonate 500 mg-vitamin D 200 units 500-200 MG-UNIT per tablet   Commonly known as:  OSCAL with D;OYSTER SHELL CALCIUM   Take 1 tablet by mouth every morning                                citalopram 40 MG tablet   Commonly known as:  celeXA   Take 20 mg by mouth daily                                etanercept 50 MG/ML injection   Commonly known as:  ENBREL   Inject 50 mg Subcutaneous once a week On Saturdays                                GARLIC PO   Take 1 capsule by mouth Patient takes 4 capsules daily. Dose unknown.                                leflunomide 10 MG tablet   Commonly known as:  ARAVA   Take 20 mg by mouth every other day                                metoprolol tartrate 50 MG tablet   Commonly known as:  LOPRESSOR   TAKE ONE-HALF TABLET BY MOUTH EVERY MORNING AND TAKE ONE TABLET BY MOUTH EVERY NIGHT AT BEDTIME                                multivitamin, therapeutic Tabs tablet   Take 1 tablet by mouth daily                                OMEGA-3 FISH OIL PO   Take 1 g by mouth daily                                omeprazole 40 MG capsule   Commonly known as:  priLOSEC   TAKE ONE CAPSULE BY  MOUTH EVERY DAY . TAKE 30-60 MINUTES BEFORE A MEAL                                polyethylene glycol Packet   Commonly known as:  MIRALAX/GLYCOLAX   Take 1 packet by mouth daily                                traZODone 75 mg Tabs half-tab   Commonly known as:  DESYREL   Take 50 mg by mouth At Bedtime                                VITAMIN D3 PO   Take 2,500 Units by mouth daily

## 2018-11-09 NOTE — ANESTHESIA CARE TRANSFER NOTE
INITIAL PSYCHIATRIC EVALUATION            IDENTIFICATION:    Patient Name  Tobi Ambrose   Date of Birth 1974   [facility] [id number]   Medical Record Number  [id number]      Age  39 y.o. PCP Altagracia Clark MD   Admit date:  6/22/2018    Room Number  724/01  @ [facility]   Date of Service  6/23/2018            HISTORY         REASON FOR HOSPITALIZATION:  CC: \"I keep pulling my hair out\". Pt admitted on a voluntary basis for severe anxiety and hair pulling    HISTORY OF PRESENT ILLNESS:    The patient, Tobi Ambrose, is a 39 y.o. BLACK OR  female with a past psychiatric history significant for SANTY, MDD, hair pulling who presents at this time with complaints of (and/or evidence of) the following emotional symptoms: severe life stressors, anxiety and impulsive hair pulling. .  Additional symptomatology include poor sleep. The above symptoms have been present for one week These symptoms are of moderate to high severity. These symptoms are constant in nature. She was brought to the ED by her parents who saw the patient one week ago and noted the increased anxiety and hair pulling. They had her evaluated by [facility] and encouraged to bring her to the ED if the sx worsened. The patient says that she does not have any positive feelings resulting from pulling her hair, no sense of relief or calm. She notes severe anxiety,panic and impulsive ego dystonic urge to pull her hair out. The patient has pulled out 80% of her hair. This is the second episode of this nature in her lifetime. She notes significant stress related to lack of employment, financial stressors/ dependent on parents, limited social supports. ALLERGIES:   Allergies   Allergen Reactions    Lisinopril Rash     Rash on lip      MEDICATIONS PRIOR TO ADMISSION:   Prescriptions Prior to Admission   Medication Sig    QUEtiapine (SEROQUEL) 25 mg tablet Take 25 mg by mouth nightly.     hydrOXYzine pamoate (VISTARIL) Patient: Emma Jenkins    Procedure(s):  colonoscopy with polypectomy    Diagnosis:  Adenomatous polyp  Diagnosis Additional Information: No value filed.    Anesthesia Type:   MAC     Note:  Airway :Room Air  Patient transferred to:Phase II  Comments: VSS.Handoff Report: Identifed the Patient, Identified the Reponsible Provider, Reviewed the pertinent medical history, Discussed the surgical course, Reviewed Intra-OP anesthesia mangement and issues during anesthesia, Set expectations for post-procedure period and Allowed opportunity for questions and acknowledgement of understanding      Vitals: (Last set prior to Anesthesia Care Transfer)    CRNA VITALS  11/9/2018 0834 - 11/9/2018 0907      11/9/2018             Pulse: 60    SpO2: 94 %                Electronically Signed By: GLORIA Rios CRNA  November 9, 2018  9:07 AM   25 mg capsule Take 25-50 mg by mouth three (3) times daily as needed for Anxiety.  FLUoxetine (PROZAC) 20 mg capsule Take 40 mg by mouth daily.  triamterene-hydrochlorothiazide (MAXZIDE) 37.5-25 mg per tablet Take 1 Tab by mouth daily. Indications: HYPERTENSION    losartan (COZAAR) 50 mg tablet Take 50 mg by mouth daily. Indications: HYPERTENSION    metformin (GLUCOPHAGE) 500 mg tablet Take 500 mg by mouth two (2) times daily (with meals). Indications: TYPE 2 DIABETES MELLITUS      PAST MEDICAL HISTORY:   Past Medical History:   Diagnosis Date    Anxiety     Depression     Diabetes ([facility] [address]     Hypertension     Panic attack     Suicidal thoughts     Vision decreased    History reviewed. No pertinent surgical history. SOCIAL HISTORY: LIves alone. College educated. Past work at [employer] for 17 years, laid off in December 2017, received severance  Social History     Social History    Marital status: SINGLE     Spouse name: N/A    Number of children: N/A    Years of education: N/A     Occupational History    Not on file. Social History Main Topics    Smoking status: Never Smoker    Smokeless tobacco: Never Used      Comment: n/a never smoked tobacco    Alcohol use No    Drug use: No    Sexual activity: Yes     Partners: Male     Birth control/ protection: None     Other Topics Concern    Not on file     Social History Narrative      FAMILY HISTORY: History reviewed. No pertinent family history. History reviewed. No pertinent family history. REVIEW OF SYSTEMS:   Negative except anxiety, panic, worry, hair pulling  Pertinent items are noted in the History of Present Illness. All other Systems reviewed and are considered negative.            MENTAL STATUS EXAM & VITALS     MENTAL STATUS EXAM (MSE):    MSE FINDINGS ARE WITHIN NORMAL LIMITS (WNL) UNLESS OTHERWISE STATED BELOW. ( ALL OF THE BELOW CATEGORIES OF THE MSE HAVE BEEN REVIEWED (reviewed 6/23/2018) AND UPDATED AS DEEMED APPROPRIATE )  General Presentation age appropriate and casually dressed, cooperative   Orientation oriented to time, place and person   Vital Signs  See below (reviewed 6/23/2018); Vital Signs (BP, Pulse, & Temp) are within normal limits if not listed below.    Gait and Station Stable/steady, no ataxia   Musculoskeletal System No extrapyramidal symptoms (EPS); no abnormal muscular movements or Tardive Dyskinesia (TD); muscle strength and tone are within normal limits   Language No aphasia or dysarthria   Speech:  normal pitch and normal volume   Thought Processes logical; normal rate of thoughts; good abstract reasoning/computation   Thought Associations goal directed   Thought Content not internally preoccupied   Suicidal Ideations none   Homicidal Ideations none   Mood:  anxious    Affect:  anxious   Memory recent  fair   Memory remote:  fair   Concentration/Attention:  wnl   Fund of Knowledge wnl   Insight:  good   Reliability good   Judgment:  good          VITALS:     Patient Vitals for the past 24 hrs:   Temp Pulse Resp BP SpO2   06/23/18 1204 98.6 °F (37 °C) 90 16 106/72 -   06/23/18 0745 98.8 °F (37.1 °C) (!) 105 16 123/84 98 %   06/22/18 2124 98.3 °F (36.8 °C) 93 18 112/66 99 %     Wt Readings from Last 3 Encounters:   06/22/18 111.1 kg (245 lb)   06/20/18 113.7 kg (250 lb 9.6 oz)   06/20/18 111.9 kg (246 lb 9.6 oz)     Temp Readings from Last 3 Encounters:   06/23/18 98.6 °F (37 °C)   06/20/18 98.2 °F (36.8 °C)   06/20/18 99 °F (37.2 °C)     BP Readings from Last 3 Encounters:   06/23/18 106/72   06/21/18 133/85   06/20/18 126/75     Pulse Readings from Last 3 Encounters:   06/23/18 90   06/21/18 80   06/20/18 91            DATA     LABORATORY DATA:  Labs Reviewed   CBC WITH AUTOMATED DIFF - Abnormal; Notable for the following:        Result Value    RDW 14.6 (*)     All other components within normal limits   METABOLIC PANEL, COMPREHENSIVE - Abnormal; Notable for the following:     Potassium 3.0 (*)     Glucose 131 (*)     BUN/Creatinine ratio 10 (*)     Globulin 4.6 (*)     A-G Ratio 0.8 (*)     All other components within normal limits   SALICYLATE - Abnormal; Notable for the following:     Salicylate level <4.1 (*)     All other components within normal limits   ACETAMINOPHEN - Abnormal; Notable for the following:     Acetaminophen level <2 (*)     All other components within normal limits   GLUCOSE, FASTING - Abnormal; Notable for the following:     Glucose 121 (*)     All other components within normal limits   GLUCOSE, POC - Abnormal; Notable for the following:     Glucose (POC) 148 (*)     All other components within normal limits   GLUCOSE, POC - Abnormal; Notable for the following:     Glucose (POC) 102 (*)     All other components within normal limits   URINE CULTURE HOLD SAMPLE   URINALYSIS W/MICROSCOPIC   ETHYL ALCOHOL   DRUG SCREEN, URINE   HCG QL SERUM   TSH 3RD GENERATION   LIPID PANEL   SAMPLES BEING HELD   GLUCOSE, POC     Admission on 06/22/2018   Component Date Value Ref Range Status    WBC 06/22/2018 8.1  3.6 - 11.0 K/uL Final    RBC 06/22/2018 4.17  3.80 - 5.20 M/uL Final    HGB 06/22/2018 11.9  11.5 - 16.0 g/dL Final    HCT 06/22/2018 37.2  35.0 - 47.0 % Final    MCV 06/22/2018 89.2  80.0 - 99.0 FL Final    MCH 06/22/2018 28.5  26.0 - 34.0 PG Final    MCHC 06/22/2018 32.0  30.0 - 36.5 g/dL Final    RDW 06/22/2018 14.6* 11.5 - 14.5 % Final    PLATELET 81/09/3310 576  150 - 400 K/uL Final    MPV 06/22/2018 9.9  8.9 - 12.9 FL Final    NRBC 06/22/2018 0.0  0  WBC Final    ABSOLUTE NRBC 06/22/2018 0.00  0.00 - 0.01 K/uL Final    NEUTROPHILS 06/22/2018 75  32 - 75 % Final    LYMPHOCYTES 06/22/2018 18  12 - 49 % Final    MONOCYTES 06/22/2018 6  5 - 13 % Final    EOSINOPHILS 06/22/2018 1  0 - 7 % Final    BASOPHILS 06/22/2018 1  0 - 1 % Final    IMMATURE GRANULOCYTES 06/22/2018 0  0.0 - 0.5 % Final    ABS. NEUTROPHILS 06/22/2018 6.0  1.8 - 8.0 K/UL Final    ABS.  LYMPHOCYTES 06/22/2018 1.5  0.8 - 3.5 K/UL Final    ABS. MONOCYTES 06/22/2018 0.5  0.0 - 1.0 K/UL Final    ABS. EOSINOPHILS 06/22/2018 0.1  0.0 - 0.4 K/UL Final    ABS. BASOPHILS 06/22/2018 0.0  0.0 - 0.1 K/UL Final    ABS. IMM. GRANS. 06/22/2018 0.0  0.00 - 0.04 K/UL Final    DF 06/22/2018 AUTOMATED    Final    Sodium 06/22/2018 138  136 - 145 mmol/L Final    Potassium 06/22/2018 3.0* 3.5 - 5.1 mmol/L Final    Chloride 06/22/2018 104  97 - 108 mmol/L Final    CO2 06/22/2018 23  21 - 32 mmol/L Final    Anion gap 06/22/2018 11  5 - 15 mmol/L Final    Glucose 06/22/2018 131* 65 - 100 mg/dL Final    BUN 06/22/2018 9  6 - 20 MG/DL Final    Creatinine 06/22/2018 0.94  0.55 - 1.02 MG/DL Final    BUN/Creatinine ratio 06/22/2018 10* 12 - 20   Final    GFR est AA 06/22/2018 >60  >60 ml/min/1.73m2 Final    GFR est non-AA 06/22/2018 >60  >60 ml/min/1.73m2 Final    Calcium 06/22/2018 9.1  8.5 - 10.1 MG/DL Final    Bilirubin, total 06/22/2018 0.4  0.2 - 1.0 MG/DL Final    ALT (SGPT) 06/22/2018 41  12 - 78 U/L Final    AST (SGOT) 06/22/2018 28  15 - 37 U/L Final    Alk.  phosphatase 06/22/2018 82  45 - 117 U/L Final    Protein, total 06/22/2018 8.1  6.4 - 8.2 g/dL Final    Albumin 06/22/2018 3.5  3.5 - 5.0 g/dL Final    Globulin 06/22/2018 4.6* 2.0 - 4.0 g/dL Final    A-G Ratio 06/22/2018 0.8* 1.1 - 2.2   Final    Color 06/22/2018 YELLOW/STRAW    Final    Appearance 06/22/2018 CLEAR  CLEAR   Final    Specific gravity 06/22/2018 1.013  1.003 - 1.030   Final    pH (UA) 06/22/2018 7.5  5.0 - 8.0   Final    Protein 06/22/2018 NEGATIVE   NEG mg/dL Final    Glucose 06/22/2018 NEGATIVE   NEG mg/dL Final    Ketone 06/22/2018 NEGATIVE   NEG mg/dL Final    Bilirubin 06/22/2018 NEGATIVE   NEG   Final    Blood 06/22/2018 NEGATIVE   NEG   Final    Urobilinogen 06/22/2018 0.2  0.2 - 1.0 EU/dL Final    Nitrites 06/22/2018 NEGATIVE   NEG   Final    Leukocyte Esterase 06/22/2018 NEGATIVE   NEG   Final    WBC 06/22/2018 0-4  0 - 4 /hpf Final    RBC 06/22/2018 0-5  0 - 5 /hpf Final    Epithelial cells 06/22/2018 FEW  FEW /lpf Final    Bacteria 06/22/2018 NEGATIVE   NEG /hpf Final    Hyaline cast 06/22/2018 0-2  0 - 5 /lpf Final    Urine culture hold 06/22/2018 URINE ON HOLD IN MICROBIOLOGY DEPT FOR 3 DAYS. IF UNPRESERVED URINE IS SUBMITTED, IT CANNOT BE USED FOR ADDITIONAL TESTING AFTER 24 HRS, RECOLLECTION WILL BE REQUIRED. Final    ALCOHOL(ETHYL),SERUM 06/22/2018 <10  <10 MG/DL Final    Salicylate level 97/30/3853 <1.7* 2.8 - 20.0 MG/DL Final    Acetaminophen level 06/22/2018 <2* 10 - 30 ug/mL Final    AMPHETAMINES 06/22/2018 NEGATIVE   NEG   Final    BARBITURATES 06/22/2018 NEGATIVE   NEG   Final    BENZODIAZEPINES 06/22/2018 NEGATIVE   NEG   Final    COCAINE 06/22/2018 NEGATIVE   NEG   Final    METHADONE 06/22/2018 NEGATIVE   NEG   Final    OPIATES 06/22/2018 NEGATIVE   NEG   Final    PCP(PHENCYCLIDINE) 06/22/2018 NEGATIVE   NEG   Final    THC (TH-CANNABINOL) 06/22/2018 NEGATIVE   NEG   Final    Drug screen comment 06/22/2018 (NOTE)   Final    HCG, Ql. 06/22/2018 NEGATIVE   NEG   Final    Glucose 06/23/2018 121* 65 - 100 MG/DL Final    TSH 06/23/2018 1.89  0.36 - 3.74 uIU/mL Final    LIPID PROFILE 06/23/2018        Final    Cholesterol, total 06/23/2018 145  <200 MG/DL Final    Triglyceride 06/23/2018 66  <150 MG/DL Final    HDL Cholesterol 06/23/2018 63  MG/DL Final    LDL, calculated 06/23/2018 68.8  0 - 100 MG/DL Final    VLDL, calculated 06/23/2018 13.2  MG/DL Final    CHOL/HDL Ratio 06/23/2018 2.3  0 - 5.0   Final    Glucose (POC) 06/23/2018 148* 65 - 100 mg/dL Final    Performed by 06/23/2018 Arlis Sep   Final    Glucose (POC) 06/23/2018 91  65 - 100 mg/dL Final    Performed by 06/23/2018 Arlis Sep   Final    Glucose (POC) 06/23/2018 102* 65 - 100 mg/dL Final    Performed by 06/23/2018 Nannie Fortune   Final        RADIOLOGY REPORTS:  No results found for this or any previous visit. No results found.           MEDICATIONS       ALL MEDICATIONS  Current Facility-Administered Medications   Medication Dose Route Frequency    risperiDONE (RisperDAL) tablet 0.5 mg  0.5 mg Oral QHS    FLUoxetine (PROzac) capsule 40 mg  40 mg Oral DAILY    clonazePAM (KlonoPIN) tablet 0.25 mg  0.25 mg Oral TID    ziprasidone (GEODON) 20 mg in sterile water (preservative free) 1 mL injection  20 mg IntraMUSCular BID PRN    OLANZapine (ZyPREXA) tablet 5 mg  5 mg Oral Q6H PRN    benztropine (COGENTIN) tablet 2 mg  2 mg Oral BID PRN    benztropine (COGENTIN) injection 2 mg  2 mg IntraMUSCular BID PRN    LORazepam (ATIVAN) injection 2 mg  2 mg IntraMUSCular Q4H PRN    LORazepam (ATIVAN) tablet 1 mg  1 mg Oral Q4H PRN    acetaminophen (TYLENOL) tablet 650 mg  650 mg Oral Q4H PRN    ibuprofen (MOTRIN) tablet 400 mg  400 mg Oral Q8H PRN    magnesium hydroxide (MILK OF MAGNESIA) 400 mg/5 mL oral suspension 30 mL  30 mL Oral DAILY PRN    nicotine (NICODERM CQ) 21 mg/24 hr patch 1 Patch  1 Patch TransDERmal DAILY PRN    zolpidem (AMBIEN) tablet 5 mg  5 mg Oral QHS PRN    losartan (COZAAR) tablet 50 mg  50 mg Oral DAILY    triamterene-hydroCHLOROthiazide (MAXZIDE) 37.5-25 mg per tablet 1 Tab  1 Tab Oral DAILY    metFORMIN (GLUCOPHAGE) tablet 500 mg  500 mg Oral BID WITH MEALS    potassium chloride SR (KLOR-CON 10) tablet 40 mEq  40 mEq Oral BID      SCHEDULED MEDICATIONS  Current Facility-Administered Medications   Medication Dose Route Frequency    risperiDONE (RisperDAL) tablet 0.5 mg  0.5 mg Oral QHS    FLUoxetine (PROzac) capsule 40 mg  40 mg Oral DAILY    clonazePAM (KlonoPIN) tablet 0.25 mg  0.25 mg Oral TID    losartan (COZAAR) tablet 50 mg  50 mg Oral DAILY    triamterene-hydroCHLOROthiazide (MAXZIDE) 37.5-25 mg per tablet 1 Tab  1 Tab Oral DAILY    metFORMIN (GLUCOPHAGE) tablet 500 mg  500 mg Oral BID WITH MEALS    potassium chloride SR (KLOR-CON 10) tablet 40 mEq  40 mEq Oral BID                ASSESSMENT & PLAN        The patient, Jovan Verde, is a 39 y.o.  female who presents at this time for treatment of the following diagnoses:  Patient Active Hospital Problem List:   Anxiety Disorder nos (3/24/2016)    Assessment: Recurrent, severe    Plan: Agree with inpatient hospitalization for further stabilization, safety monitoring and medication management  Medications- continue Prozac which she reports has stabilized her depression, Add klonopin and Risperdal, which helped with her hair pulling in the past.   Provided supportive psychotherapy    Diabetes  Assessment: mild  Plan: resume metformin  Diabetic diet  POC glucose    HTN  Assessment: moderate to severe  Plan: resume home meds- losartan and maxzide           A coordinated, multidisplinary treatment team (includes the nurse, unit pharmcist,  and writer) round was conducted for this initial evaluation with the patient present. The following regarding medications was addressed during rounds with patient:   the risks and benefits of the proposed medication. The patient was given the opportunity to ask questions. Informed consent given to the use of the above medications. I will continue to adjust psychiatric and non-psychiatric medications (see above \"medication\" section and orders section for details) as deemed appropriate & based upon diagnoses and response to treatment. I have reviewed admission (and previous/old) labs and medical tests in the EHR and or transferring hospital documents. I will continue to order blood tests/labs and diagnostic tests as deemed appropriate and review results as they become available (see orders for details). I have reviewed old psychiatric and medical records available in the EHR. I Will order additional psychiatric records from other institutions to further elucidate the nature of patient's psychopathology and review once available.     I will gather additional collateral information from friends, family and o/p treatment team to further elucidate the nature of patient's psychopathology and baselline level of psychiatric functioning.       ESTIMATED LENGTH OF STAY:    3-5 days       STRENGTHS:  Access to housing/residential stability and Interpersonal/supportive relationships (family, friends, peers)                                        SIGNED:    Lori Farias MD  6/23/2018

## 2018-11-09 NOTE — DISCHARGE INSTRUCTIONS
SED[facility] ADULT DISCHARGE INSTRUCTIONS   SPECIAL PRECAUTIONS FOR 24 HOURS AFTER SURGERY    IT IS NOT UNUSUAL TO FEEL LIGHT-HEADED OR FAINT, UP TO 24 HOURS AFTER SURGERY OR WHILE TAKING PAIN MEDICATION.  IF YOU HAVE THESE SYMPTOMS; SIT FOR A FEW MINUTES BEFORE STANDING AND HAVE SOMEONE ASSIST YOU WHEN YOU GET UP TO WALK OR USE THE BATHROOM.    YOU SHOULD REST AND RELAX FOR THE NEXT 24 HOURS AND YOU MUST MAKE ARRANGEMENTS TO HAVE SOMEONE STAY WITH YOU FOR AT LEAST 24 HOURS AFTER YOUR DISCHARGE.  AVOID HAZARDOUS AND STRENUOUS ACTIVITIES.  DO NOT MAKE IMPORTANT DECISIONS FOR 24 HOURS.    DO NOT DRIVE ANY VEHICLE OR OPERATE MECHANICAL EQUIPMENT FOR 24 HOURS FOLLOWING THE END OF YOUR SURGERY.  EVEN THOUGH YOU MAY FEEL NORMAL, YOUR REACTIONS MAY BE AFFECTED BY THE MEDICATION YOU HAVE RECEIVED.    DO NOT DRINK ALCOHOLIC BEVERAGES FOR 24 HOURS FOLLOWING YOUR SURGERY.    DRINK CLEAR LIQUIDS (APPLE JUICE, GINGER ALE, 7-UP, BROTH, ETC.).  PROGRESS TO YOUR REGULAR DIET AS YOU FEEL ABLE.    YOU MAY HAVE A DRY MOUTH, A SORE THROAT, MUSCLES ACHES OR TROUBLE SLEEPING.  THESE SHOULD GO AWAY AFTER 24 HOURS.    CALL YOUR DOCTOR FOR ANY OF THE FOLLOWING:  SIGNS OF INFECTION (FEVER, GROWING TENDERNESS AT THE SURGERY SITE, A LARGE AMOUNT OF DRAINAGE OR BLEEDING, SEVERE PAIN, FOUL-SMELLING DRAINAGE, REDNESS OR SWELLING.    IT HAS BEEN OVER 8 TO 10 HOURS SINCE SURGERY AND YOU ARE STILL NOT ABLE TO URINATE (PASS WATER).         COLONOSCOPY DISCHARGE INSTRUCTIONS    You may not drive, use heavy equipment or consume alcohol for 24 hours because the drugs you were given may cause dizziness, drowsiness, forgetfulness and slower reaction time.    You may resume your regular diet and medications.    If you had a biopsy or polypectomy done, do not take aspirin, aleve (naproxen) or ibuprofen products for the next 10 days.  Tylenol (acetaminophen) is safe to take.    What to watch for:    Problems rarely occur after the exam.  It is important for you  to be aware of the early signs of a possible complication.  Call your doctor immediately if you notice any of the followin.  Unusual or persistent abdominal pain (pain that does not move around like a gas pain).    2.  Passing bright red blood from your rectum.    3.  Black or bloody stools.    4.  Temperature above 100.6 degrees F (37.5 degrees C)    If you fell this has become a medical emergency please call 911.

## 2018-11-09 NOTE — ANESTHESIA POSTPROCEDURE EVALUATION
Patient: Emma Jenkins    Procedure(s):  colonoscopy with polypectomy    Diagnosis: Adenomatous polyp  Diagnosis Additional Information: Adenomatous polyp    Anesthesia Type:  MAC    Note:  Anesthesia Post Evaluation    Patient location during evaluation: PACU  Patient participation: Able to fully participate in evaluation  Level of consciousness: awake and alert  Pain management: adequate  Airway patency: patent  Cardiovascular status: acceptable  Respiratory status: acceptable  Hydration status: acceptable  PONV: controlled     Anesthetic complications: None          Last vitals:  Vitals:    11/09/18 0908 11/09/18 0921 11/09/18 0939   BP: 106/62 108/63 106/69   Pulse: 58 56 58   Resp:      Temp: 97.5  F (36.4  C)     SpO2: 98% 99% 99%         Electronically Signed By: Kingsley Lezama MD  November 9, 2018  3:12 PM

## 2018-11-09 NOTE — LETTER
November 1, 2018      Emma Jenkins  [address] [address]        Dear Emma,           Thank you for choosing [facility]. You are scheduled for the following service.     Date:  11-09-18             Procedure:  COLONOSCOPY  Doctor:        Corbin   Arrival Time:  0620  *Check in at Emergency/Endoscopy desk*  Procedure Time:  0750    Location:   [facility]        Endoscopy Department, First Floor (Enter through ER Doors) *        [address] [place], [place] [address] [phone] or [phone] () to reschedule      MIRALAX -GATORADE  PREP  Colonoscopy is the most accurate test to detect colon polyps and colon cancer; and the only test where polyps can be removed. During this procedure, a doctor examines the lining of your large intestine and rectum through a flexible tube.           Transportation  Arrange for a ride for the day of your procedure with a responsible adult.  A taxi ride is not an option unless you are accompanied by a responsible adult. If you fail to arrange transportation with a responsible adult, your procedure will be cancelled and rescheduled.    Purchase the  following supplies at your local pharmacy:  - 2 (two) bisacodyl tablets: each tablet contains 5 mg.  (Dulcolax  laxative NOT Dulcolax  stool softener)   - 1 (one) 8.3 oz bottle of Polyethylene Glycol (PEG) 3350 Powder   (MiraLAX , Smooth LAX , ClearLAX  or equivalent)  - 64 oz Gatorade    Regular Gatorade, Gatorade G2 , Powerade , Powerade Zero  or Pedialyte  is acceptable. Red colored flavors are not allowed; all other colors (yellow, green, orange, purple and blue) are okay. It is also okay to buy two 2.12 oz packets of powdered Gatorade that can be mixed with water to a total volume of 64 oz of liquid.  - 1 (one) 10 oz bottle of Magnesium Citrate (Red colored flavors are not allowed)  It is also okay for you to use a 0.5 oz package of powdered  magnesium citrate (17 g) mixed with 10 oz of water.    PREPARATION FOR COLONOSCOPY    7 days before:    Discontinue fiber supplements and medications containing iron. This includes Metamucil  and Fibercon ; and multivitamins with iron.  3 days before:    Begin a low-fiber diet. A low-fiber diet helps making the cleanout more effective.     Examples of a low-fiber diet include (but are not limited to): white bread, white rice, pasta, crackers, fish, chicken, eggs, ground beef, creamy peanut butter, cooked/steamed/boiled vegetables, canned fruit, bananas, melons, milk, plain yogurt cheese, salad dressing and other condiments.     The following are not allowed on a low-fiber diet: seeds, nuts, popcorn, bran, whole wheat, corn, quinoa, raw fruits and vegetables, berries and dried fruit, beans and lentils.    For additional details on low-fiber diet, please refer to the table on the last page.  2 days before:    Continue the low-fiber diet.     Drink at least 8 glasses of water throughout the day.     Stop eating solid foods at 11:45 pm.  1 day before:    In the morning: begin a clear liquid diet (liquids you can see through).     Examples of a clear liquid diet include: water, clear broth or bouillon, Gatorade, Pedialyte or Powerade, carbonated and non-carbonated soft drinks (Sprite , 7-Up , ginger ale), strained fruit juices without pulp (apple, white grape, white cranberry), Jell-O  and popsicles.     The following are not allowed on a clear liquid diet: red liquids, alcoholic beverages, dairy products (milk, creamer, and yogurt), protein shakes, creamy broths, juice with pulp and chewing tobacco.    At noon: take 2 (two) bisacodyl tablets     At 4 (and no later than 6pm): start drinking the Miralax-Gatorade preparation (8.3 oz of Miralax mixed with 64 oz of Gatorade in a large pitcher). Drink 1(one) 8 oz glass every 15 minutes thereafter, until the mixture is gone.    COLON CLEANSING TIPS: drink adequate amounts of  fluids before and after your colon cleansing to prevent dehydration. Stay near a toilet because you will have diarrhea. Even if you are sitting on the toilet, continue to drink the cleansing solution every 15 minutes. If you feel nauseous or vomit, rinse your mouth with water, take a 15 to 30-minute-break and then continue drinking the solution. You will be uncomfortable until the stool has flushed from your colon (in about 2 to 4 hours). You may feel chilled.              Day of your procedure  You may take all of your morning medications including blood pressure medications, blood thinners (if you have not been instructed to stop these by our office), methadone, anti-seizure medications with sips of water 3 hours prior to your procedure or earlier. Do not take insulin or vitamins prior to your procedure. Continue the clear liquid diet.   4 hours prior: drink 10 oz of magnesium citrate. It may be easier to drink it with a straw.    STOP consuming all liquids after that.     Do not take anything by mouth during this time.     Allow extra time to travel to your procedure as you may need to stop and use a restroom along the way.  You are ready for the procedure, if you followed all instructions and your stool is no longer formed, but clear or yellow liquid. If you are unsure whether your colon is clean, please call our office at [phone] before you leave for your appointment.  Bring the following to your procedure:  - Insurance Card/Photo ID.   - List of current medications including over-the-counter medications and supplements.   - Your rescue inhaler if you currently use one to control asthma.      Canceling or rescheduling your appointment:   If you must cancel or reschedule your appointment, please call [phone] as soon as possible.      COLONOSCOPY PRE-PROCEDURE CHECKLIST  If you have diabetes, ask your regular doctor for diet and medication restrictions.  If you take an anticoagulant or anti-platelet  medication (such as Coumadin , Lovenox , Pradaxa , Xarelto , Eliquis , etc.), please call your primary doctor for advice on holding this medication.  If you take aspirin you may continue to do so.  If you are or may be pregnant, please discuss the risks and benefits of this procedure with your doctor.          What happens during a colonoscopy?    Plan to spend up to two hours, starting at registration time, at the endoscopy center the day of your procedure. The colonoscopy takes an average of 15 to 30 minutes. Recovery time is about 30 minutes.    Before the exam:    You will change into a gown.    Your medical history and medication list will be reviewed with you, unless that has been done over the phone prior to the procedure.     A nurse will insert an intravenous (IV) line into your hand or arm.    The doctor will meet with you and will give you a consent form to sign.    During the exam:     Medicine will be given through the IV line to help you relax.     Your heart rate and oxygen levels will be monitored. If your blood pressure is low, you may be given fluids through the IV line.     The doctor will insert a flexible hollow tube, called a colonoscope, into your rectum. The scope will be advanced slowly through the large intestine (colon).    You may have a feeling of fullness or pressure.     If an abnormal tissue or a polyp is found, the doctor may remove it through the endoscope for closer examination, or biopsy. Tissue removal is painless    After the exam:           Any tissue samples removed during the exam will be sent to a lab for evaluation. It may take 5-7 working days for you to be notified of the results.     A nurse will provide you with complete discharge instructions before you leave the endoscopy center. Be sure to ask the nurse for specific instructions if you take blood thinners such as Aspirin, Coumadin or Plavix.     The doctor will prepare a full report for you and for the physician who  referred you for the procedure.     Your doctor will talk with you about the initial results of your exam.      Medication given during the exam will prohibit you from driving for the rest of the day.     Following the exam, you may resume your normal diet. Your first meal should be light, no greasy foods. Avoid alcohol until the next day.     You may resume your regular activities the day after the procedure.     LOW-FIBER DIET    Foods RECOMMENDED Foods to AVOID   Breads, Cereal, Rice and Pasta:   White bread, rolls, biscuits, croissant and twyla toast.   Waffles, Sudanese toast and pancakes.   White rice, noodles, pasta, macaroni and peeled cooked potatoes.   Plain crackers and saltines.   Cooked cereals: farina, cream of rice.   Cold cereals: Puffed Rice , Rice Krispies , Corn Flakes  and Special K    Breads, Cereal, Rice and Pasta:   Breads or rolls with nuts, seeds or fruit.   Whole wheat, pumpernickel, rye breads and cornbread.   Potatoes with skin, brown or wild rice, and kasha (buckwheat).     Vegetables:   Tender cooked and canned vegetables without seeds: carrots, asparagus tips, green or wax beans, pumpkin, spinach, lima beans. Vegetables:   Raw or steamed vegetables.   Vegetables with seeds.   Sauerkraut.   Winter squash, peas, broccoli, Brussel sprouts, cabbage, onions, cauliflower, baked beans, peas and corn.   Fruits:   Strained fruit juice.   Canned fruit, except pineapple.   Ripe bananas and melon. Fruits:   Prunes and prune juice.   Raw fruits.   Dried fruits: figs, dates and raisins.   Milk/Dairy:   Milk: plain or flavored.   Yogurt, custard and ice cream.   Cheese and cottage cheese Milk/Dairy:     Meat and other proteins:   ground, well-cooked tender beef, lamb, ham, veal, pork, fish, poultry and organ meats.   Eggs.   Peanut butter without nuts. Meat and other proteins:   Tough, fibrous meats with gristle.   Dry beans, peas and lentils.   Peanut butter with nuts.   Tofu.   Fats, Snack, Sweets,  Condiments and Beverages:   Margarine, butter, oils, mayonnaise, sour cream and salad dressing, plain gravy.   Sugar, hard candy, clear jelly, honey and syrup.   Spices, cooked herbs, bouillon, broth and soups made with allowed vegetable, ketchup and mustard.   Coffee, tea and carbonated drinks.   Plain cakes, cookies and pretzels.   Gelatin, plain puddings, custard, ice cream, sherbet and popsicles. Fats, Snack, Sweets, Condiments and Beverages:   Nuts, seeds and coconut.   Jam, marmalade and preserves.   Pickles, olives, relish and horseradish.   All desserts containing nuts, seeds, dried fruit and coconut; or made from whole grains or bran.   Candy made with nuts or seeds.   Popcorn.                     DIRECTIONS TO THE ENDOSCOPY DEPARTMENT     From the north ([facility])  Take [address] South, exit on [address] [address]. Get into the left hand katie, turn left (east), go one-half mile to [address] and turn left. Go north to the first stoplight, take a right on [address] and follow it to the Emergency entrance.    From the south ([facility])  Take 35N to the 35E split and exit on [address] [address]. On [address] [address], turn left (west) to [address]. Turn right (north) on [address]. Go north to the first stoplight, take a right on [address] and follow it to the Emergency entrance.    From the east via 35E ([facility])  Take 35E south to [address] [address] exit. Turn right on [address] [address]. Go west to [address]. Turn right (north) on [address]. Go to the first stoplight, take a right and follow on [address] to the Emergency entrance.    From the east via [address] ([facility])  Take [address] to [address]. Turn left (south) on [address] to [address]. Turn left (east) on [address] and follow it to the Emergency entrance.    From the west via [address] ([place], [place])  Take [address] east to [address].  Turn right (south) on [address] to [place]. Turn left (east) on [address] and follow it to the Emergency entrance.

## 2018-11-09 NOTE — PROGRESS NOTES
"SPIRITUAL HEALTH SERVICES Progress Note  [facility] Pre-surgical    Met pt and  as pt's request prior to surgery.  Pt anxious and hopeful she \"won't ever have to do this again\".  Pt asked for prayer soon after I came in the room.  Her [employer] dot and her dot community is a strong resource to her.  Her  joined in the prayer, drawing on their dot resources, the laying on of hands and [employer] ministry as a healer, and the Lord's prayer.  Pt and  appeared a bit more at ease after the prayer.   No further needs or plan for follow up care at this time.      Jolene (RAD) Markie  Staff   On-call  [phone]    "

## 2018-11-09 NOTE — IP AVS SNAPSHOT
[facility] PreOP/PostOP    [address]    [facility] [address]    Phone:  [phone]    Fax:  [phone]                                       After Visit Summary   11/9/2018    Emma Jenkins    MRN: [id number]           After Visit Summary Signature Page     I have received my discharge instructions, and my questions have been answered. I have discussed any challenges I see with this plan with the nurse or doctor.    ..........................................................................................................................................  Patient/Patient Representative Signature      ..........................................................................................................................................  Patient Representative Print Name and Relationship to Patient    ..................................................               ................................................  Date                                   Time    ..........................................................................................................................................  Reviewed by Signature/Title    ...................................................              ..............................................  Date                                               Time          22EPIC Rev 08/18

## 2018-11-09 NOTE — ANESTHESIA PREPROCEDURE EVALUATION
PAC NOTE:       ANESTHESIA PRE EVALUATION:  Anesthesia Evaluation     . Pt has had prior anesthetic. Type: General and MAC           ROS/MED HX    ENT/Pulmonary:  - neg pulmonary ROS     Neurologic:  - neg neurologic ROS     Cardiovascular:     (+) ----. : . . . :. Irregular Heartbeat/Palpitations, .       METS/Exercise Tolerance:     Hematologic:  - neg hematologic  ROS       Musculoskeletal:  - neg musculoskeletal ROS       GI/Hepatic:     (+) GERD Asymptomatic on medication,       Renal/Genitourinary:  - ROS Renal section negative       Endo:  - neg endo ROS       Psychiatric:  - neg psychiatric ROS       Infectious Disease:  - neg infectious disease ROS       Malignancy:      - no malignancy   Other:    (+) No chance of pregnancy C-spine cleared: N/A, no H/O Chronic Pain,no other significant disability   - neg other ROS                 Physical Exam  Normal systems: cardiovascular, pulmonary and dental    Airway   Mallampati: II  TM distance: >3 FB  Neck ROM: full    Dental     Cardiovascular       Pulmonary              Anesthesia Plan      History & Physical Review  History and physical reviewed and following examination; no interval change.    ASA Status:  2 .    NPO Status:  > 8 hours    Plan for MAC with Intravenous induction. Maintenance will be Balanced.  Reason for MAC:  Extreme anxiety (QS) and Deep or markedly invasive procedure (G8)  PONV prophylaxis:  Ondansetron (or other 5HT-3) and Dexamethasone or Solumedrol       Postoperative Care  Postoperative pain management:  IV analgesics.      Consents  Anesthetic plan, risks, benefits and alternatives discussed with:  Patient.  Use of blood products discussed: Yes.   Use of blood products discussed with Patient.  Consented to blood products.  .                            .

## 2018-11-12 LAB — COPATH REPORT: NORMAL

## 2018-11-21 DIAGNOSIS — R00.0 TACHYCARDIA: ICD-10-CM

## 2018-11-23 NOTE — TELEPHONE ENCOUNTER
"Requested Prescriptions   Pending Prescriptions Disp Refills     metoprolol tartrate (LOPRESSOR) 50 MG tablet [Pharmacy Med Name: METOPROLOL TARTRATE 50MG TABS] 135 tablet 0    Last Written Prescription Date:  08/28/2018  Last Fill Quantity: 135,  # refills: 0   Last office visit: 11/6/2018 with prescribing provider:     Future Office Visit:   Sig: TAKE ONE-HALF TABLET BY MOUTH EVERY MORNING AND TAKE ONE TABLET BY MOUTH EVERY NIGHT AT BEDTIME    Beta-Blockers Protocol Passed    11/21/2018  3:39 PM       Passed - Blood pressure under 140/90 in past 12 months    BP Readings from Last 3 Encounters:   11/09/18 106/69   11/06/18 116/84   10/03/18 120/82                Passed - Patient is age 6 or older       Passed - Recent (12 mo) or future (30 days) visit within the authorizing provider's specialty    Patient had office visit in the last 12 months or has a visit in the next 30 days with authorizing provider or within the authorizing provider's specialty.  See \"Patient Info\" tab in inbasket, or \"Choose Columns\" in Meds & Orders section of the refill encounter.              "

## 2018-11-27 RX ORDER — METOPROLOL TARTRATE 50 MG
TABLET ORAL
Qty: 135 TABLET | Refills: 1 | Status: SHIPPED | OUTPATIENT
Start: 2018-11-27 | End: 2019-03-08

## 2018-11-30 ENCOUNTER — TELEPHONE (OUTPATIENT)
Dept: INTERNAL MEDICINE | Facility: CLINIC | Age: 73
End: 2018-11-30

## 2018-11-30 NOTE — TELEPHONE ENCOUNTER
Reason for Call:  Other call back    Detailed comments: Pt. Thinks that she has some after effects from colonoscopy done a couple months age    Phone Number Patient can be reached at: Home number on file [phone] (home) or cell [phone]    Best Time: asap    Can we leave a detailed message on this number? NO    Call taken on 11/30/2018 at 1:36 PM by Lila Landis

## 2018-11-30 NOTE — TELEPHONE ENCOUNTER
Contacted patient. She has been having daily stool incontinence since her colonoscopy 2 months ago. Patient states she does not have an urge to have a bowel movement and then finds the stool in her underwear when she goes to the bathroom. The amount of stool can vary each time. This started about 1 week after the colonoscopy. She was initially very constipated following colonoscopy, then had one formed stool and stool has been loose since. Denies any blood in the stool or abdominal pain. She is taking Miralax 1/2 capful daily, states she gets constipated if she does not take this. Patient was having formed stools prior to her colonoscopy.     Recommended patient decrease Miralax to 1/4 capful daily or 1/2 capful every other day to see if this helps with the loose stools. She may need to make further adjustments or increase the dose if she develops constipation and it could take some time to find the right balance. Requested patient call back in 1 week if symptoms do not improve. Patient verbalizes understanding and agrees with plan.

## 2018-12-06 ENCOUNTER — TRANSFERRED RECORDS (OUTPATIENT)
Dept: HEALTH INFORMATION MANAGEMENT | Facility: CLINIC | Age: 73
End: 2018-12-06

## 2019-01-15 ENCOUNTER — TELEPHONE (OUTPATIENT)
Dept: INTERNAL MEDICINE | Facility: CLINIC | Age: 74
End: 2019-01-15

## 2019-01-15 NOTE — TELEPHONE ENCOUNTER
pt needs to talk  to someone about 2 meds that were discussed at last appt but she choose to refuse them at that time   Would  now like to consider

## 2019-01-18 NOTE — TELEPHONE ENCOUNTER
Patient calls back.   She was told to start medication following bone density scan, but never received this. Informed patient that prescription was sent for Alendronate (see 11/1/18 telephone encounter), patient states she never received this. This RN will follow-up with the pharmacy to ensure they received this and can send it to her. Contacted [facility]. They did receive prescription, but patient  didn't return their call so it was never sent out. Advised patient would like to start medication, they will start process to ship this to patient.    She also has question about cholesterol, she is hesitant to take medication and interested in getting cholesterol rechecked as she has lost some weight. She also states Dr. Scott did discuss a low dose cholesterol medication at her appointment.    Per Dr. Scott's 10/3/18 office visit note:   (E78.5) Hyperlipidemia LDL goal <130  Comment:  Reviewed cardiac risk score. Discussed that it is recommended to start a statin, but patient prefers to avoid doing so due to the possibility of muscle aches. I do not believe it is necessary to start a statin at the moment. If interested in starting a statin in the future, may consider starting pravastatin at a low dose. Do not recommend working on further weight loss, instead just continue with regular exercise.   Plan: Comprehensive metabolic panel, Lipid panel         reflex to direct LDL Fasting, pravastatin         (PRAVACHOL) 10 MG tablet    Informed patient that Dr. Scott did order lab at last appointment and offered Pravastatin if interested. Patient would like to have lab appointment before deciding on medication. Scheduled lab only appointment for 2/1/19.

## 2019-02-01 DIAGNOSIS — E78.5 HYPERLIPIDEMIA LDL GOAL <130: ICD-10-CM

## 2019-02-01 PROCEDURE — 36415 COLL VENOUS BLD VENIPUNCTURE: CPT | Performed by: INTERNAL MEDICINE

## 2019-02-01 PROCEDURE — 80061 LIPID PANEL: CPT | Performed by: INTERNAL MEDICINE

## 2019-02-01 PROCEDURE — 80053 COMPREHEN METABOLIC PANEL: CPT | Performed by: INTERNAL MEDICINE

## 2019-02-02 LAB
ALBUMIN SERPL-MCNC: 3.5 G/DL (ref 3.4–5)
ALP SERPL-CCNC: 88 U/L (ref 40–150)
ALT SERPL W P-5'-P-CCNC: 32 U/L (ref 0–50)
ANION GAP SERPL CALCULATED.3IONS-SCNC: 2 MMOL/L (ref 3–14)
AST SERPL W P-5'-P-CCNC: 30 U/L (ref 0–45)
BILIRUB SERPL-MCNC: 0.4 MG/DL (ref 0.2–1.3)
BUN SERPL-MCNC: 15 MG/DL (ref 7–30)
CALCIUM SERPL-MCNC: 9.4 MG/DL (ref 8.5–10.1)
CHLORIDE SERPL-SCNC: 105 MMOL/L (ref 94–109)
CHOLEST SERPL-MCNC: 246 MG/DL
CO2 SERPL-SCNC: 30 MMOL/L (ref 20–32)
CREAT SERPL-MCNC: 0.69 MG/DL (ref 0.52–1.04)
GFR SERPL CREATININE-BSD FRML MDRD: 85 ML/MIN/{1.73_M2}
GLUCOSE SERPL-MCNC: 87 MG/DL (ref 70–99)
HDLC SERPL-MCNC: 58 MG/DL
LDLC SERPL CALC-MCNC: 170 MG/DL
NONHDLC SERPL-MCNC: 188 MG/DL
POTASSIUM SERPL-SCNC: 4.6 MMOL/L (ref 3.4–5.3)
PROT SERPL-MCNC: 7.4 G/DL (ref 6.8–8.8)
SODIUM SERPL-SCNC: 137 MMOL/L (ref 133–144)
TRIGL SERPL-MCNC: 89 MG/DL

## 2019-02-04 ENCOUNTER — TELEPHONE (OUTPATIENT)
Dept: INTERNAL MEDICINE | Facility: CLINIC | Age: 74
End: 2019-02-04

## 2019-02-04 DIAGNOSIS — R42 DIZZINESS: ICD-10-CM

## 2019-02-04 DIAGNOSIS — E78.5 HYPERLIPIDEMIA LDL GOAL <130: Primary | ICD-10-CM

## 2019-02-04 NOTE — TELEPHONE ENCOUNTER
Patient called regarding cholesterol levels, she saw lab results on Mychart and noticed they are higher than previous lab.     Dr. Scott had suggested Pravastatin at 10/3/18 appointment. Per Dr. Scott's 10/3/18 office visit note:   (E78.5) Hyperlipidemia LDL goal <130  Comment:  Reviewed cardiac risk score. Discussed that it is recommended to start a statin, but patient prefers to avoid doing so due to the possibility of muscle aches. I do not believe it is necessary to start a statin at the moment. If interested in starting a statin in the future, may consider starting pravastatin at a low dose. Do not recommend working on further weight loss, instead just continue with regular exercise.   Plan: Comprehensive metabolic panel, Lipid panel         reflex to direct LDL Fasting, pravastatin         (PRAVACHOL) 10 MG tablet    Patient is interested in starting medication since levels went up, requesting to have prescription sent to [facility].     Patient also reports vertigo for 5 days. Better if she keeps her head still and has to move very slow as it's triggered by movement. She has had this in the past and it's not as severe as it has been before. Denies ringing in ears, hearing changes, vision changes, severe headache, nausea/vomiting or medication changes. She is going on vacation to the [facility] - about [place] elevation. She leaves on Saturday and asks for any recommendations for treatment prior to leaving.

## 2019-02-06 RX ORDER — MECLIZINE HYDROCHLORIDE 25 MG/1
25 TABLET ORAL 3 TIMES DAILY PRN
Qty: 30 TABLET | Refills: 1 | Status: SHIPPED | OUTPATIENT
Start: 2019-02-06 | End: 2019-03-28

## 2019-02-06 RX ORDER — PRAVASTATIN SODIUM 10 MG
10 TABLET ORAL DAILY
Qty: 90 TABLET | Refills: 0 | Status: SHIPPED | OUTPATIENT
Start: 2019-02-06 | End: 2019-05-01

## 2019-02-06 NOTE — TELEPHONE ENCOUNTER
Patient called to check on status of message to Dr. Scott, she is leaving in 2 days for extended trip. Please advise.

## 2019-02-06 NOTE — TELEPHONE ENCOUNTER
Not certain as to cause of dizziness, symptoms sound a bit like benign paroxysmal positional vertigo which sometimes responds to meclizine. Will fax an Rx to her pharmacy if she would like to give this a try.   Faxed Rx to her pharmacy for pravastatin for cholesterol, meclizine for dizziness.    Please advise pt.

## 2019-03-08 DIAGNOSIS — R00.0 TACHYCARDIA: ICD-10-CM

## 2019-03-08 NOTE — TELEPHONE ENCOUNTER
"Requested Prescriptions   Pending Prescriptions Disp Refills     metoprolol tartrate (LOPRESSOR) 50 MG tablet [Pharmacy Med Name: METOPROLOL TARTRATE 50MG TABS] 135 tablet 1    Last Written Prescription Date:  11/27/2018  Last Fill Quantity: 135,  # refills: 1   Last office visit: 11/6/2018 with prescribing provider:     Future Office Visit:   Sig: TAKE ONE-HALF TABLET BY MOUTH EVERY MORNING AND TAKE ONE TABLET BY MOUTH EVERY NIGHT AT BEDTIME    Beta-Blockers Protocol Passed - 3/8/2019 12:00 PM       Passed - Blood pressure under 140/90 in past 12 months    BP Readings from Last 3 Encounters:   11/09/18 106/69   11/06/18 116/84   10/03/18 120/82                Passed - Patient is age 6 or older       Passed - Recent (12 mo) or future (30 days) visit within the authorizing provider's specialty    Patient had office visit in the last 12 months or has a visit in the next 30 days with authorizing provider or within the authorizing provider's specialty.  See \"Patient Info\" tab in inbasket, or \"Choose Columns\" in Meds & Orders section of the refill encounter.             Passed - Medication is active on med list        "

## 2019-03-12 RX ORDER — METOPROLOL TARTRATE 50 MG
TABLET ORAL
Qty: 135 TABLET | Refills: 1 | Status: SHIPPED | OUTPATIENT
Start: 2019-03-12 | End: 2019-03-28

## 2019-03-12 NOTE — TELEPHONE ENCOUNTER
Prescription approved per [facility] Refill Protocol.    Per office visit note from 10/3/18: return in about 1 year (around 10/3/19) for routine visit

## 2019-03-28 ENCOUNTER — HOSPITAL ENCOUNTER (OUTPATIENT)
Facility: CLINIC | Age: 74
Setting detail: OBSERVATION
Discharge: HOME | End: 2019-03-29
Attending: PHYSICIAN ASSISTANT | Admitting: INTERNAL MEDICINE
Payer: COMMERCIAL

## 2019-03-28 ENCOUNTER — APPOINTMENT (OUTPATIENT)
Dept: CT IMAGING | Facility: CLINIC | Age: 74
End: 2019-03-28
Attending: PHYSICIAN ASSISTANT
Payer: COMMERCIAL

## 2019-03-28 DIAGNOSIS — R10.30 ABDOMINAL PAIN, LOWER: ICD-10-CM

## 2019-03-28 DIAGNOSIS — R19.7 DIARRHEA, UNSPECIFIED TYPE: ICD-10-CM

## 2019-03-28 DIAGNOSIS — N28.9 RENAL LESION: Primary | ICD-10-CM

## 2019-03-28 PROBLEM — K52.9 GASTROENTERITIS: Status: ACTIVE | Noted: 2019-03-28

## 2019-03-28 LAB
ALBUMIN SERPL-MCNC: 3.2 G/DL (ref 3.4–5)
ALBUMIN UR-MCNC: NEGATIVE MG/DL
ALP SERPL-CCNC: 87 U/L (ref 40–150)
ALT SERPL W P-5'-P-CCNC: 42 U/L (ref 0–50)
ANION GAP SERPL CALCULATED.3IONS-SCNC: 3 MMOL/L (ref 3–14)
APPEARANCE UR: CLEAR
AST SERPL W P-5'-P-CCNC: 31 U/L (ref 0–45)
BASOPHILS # BLD AUTO: 0 10E9/L (ref 0–0.2)
BASOPHILS NFR BLD AUTO: 0.5 %
BILIRUB SERPL-MCNC: 0.4 MG/DL (ref 0.2–1.3)
BILIRUB UR QL STRIP: NEGATIVE
BUN SERPL-MCNC: 11 MG/DL (ref 7–30)
CALCIUM SERPL-MCNC: 8.6 MG/DL (ref 8.5–10.1)
CHLORIDE SERPL-SCNC: 105 MMOL/L (ref 94–109)
CO2 SERPL-SCNC: 27 MMOL/L (ref 20–32)
COLOR UR AUTO: YELLOW
CREAT BLD-MCNC: 0.6 MG/DL (ref 0.52–1.04)
CREAT SERPL-MCNC: 0.62 MG/DL (ref 0.52–1.04)
DIFFERENTIAL METHOD BLD: NORMAL
EOSINOPHIL # BLD AUTO: 0.1 10E9/L (ref 0–0.7)
EOSINOPHIL NFR BLD AUTO: 1.3 %
ERYTHROCYTE [DISTWIDTH] IN BLOOD BY AUTOMATED COUNT: 13.1 % (ref 10–15)
GFR SERPL CREATININE-BSD FRML MDRD: 89 ML/MIN/{1.73_M2}
GFR SERPL CREATININE-BSD FRML MDRD: >90 ML/MIN/{1.73_M2}
GLUCOSE SERPL-MCNC: 103 MG/DL (ref 70–99)
GLUCOSE UR STRIP-MCNC: NEGATIVE MG/DL
HCT VFR BLD AUTO: 39.4 % (ref 35–47)
HGB BLD-MCNC: 12.5 G/DL (ref 11.7–15.7)
HGB UR QL STRIP: NEGATIVE
IMM GRANULOCYTES # BLD: 0 10E9/L (ref 0–0.4)
IMM GRANULOCYTES NFR BLD: 0.2 %
KETONES UR STRIP-MCNC: NEGATIVE MG/DL
LACTATE BLD-SCNC: 0.6 MMOL/L (ref 0.7–2)
LEUKOCYTE ESTERASE UR QL STRIP: NEGATIVE
LYMPHOCYTES # BLD AUTO: 1.1 10E9/L (ref 0.8–5.3)
LYMPHOCYTES NFR BLD AUTO: 17.1 %
MCH RBC QN AUTO: 28.7 PG (ref 26.5–33)
MCHC RBC AUTO-ENTMCNC: 31.7 G/DL (ref 31.5–36.5)
MCV RBC AUTO: 91 FL (ref 78–100)
MONOCYTES # BLD AUTO: 0.8 10E9/L (ref 0–1.3)
MONOCYTES NFR BLD AUTO: 12.7 %
MUCOUS THREADS #/AREA URNS LPF: PRESENT /LPF
NEUTROPHILS # BLD AUTO: 4.4 10E9/L (ref 1.6–8.3)
NEUTROPHILS NFR BLD AUTO: 68.2 %
NITRATE UR QL: NEGATIVE
NRBC # BLD AUTO: 0 10*3/UL
NRBC BLD AUTO-RTO: 0 /100
PH UR STRIP: 6 PH (ref 5–7)
PLATELET # BLD AUTO: 178 10E9/L (ref 150–450)
POTASSIUM SERPL-SCNC: 4 MMOL/L (ref 3.4–5.3)
PROT SERPL-MCNC: 6.8 G/DL (ref 6.8–8.8)
RBC # BLD AUTO: 4.35 10E12/L (ref 3.8–5.2)
RBC #/AREA URNS AUTO: 1 /HPF (ref 0–2)
SODIUM SERPL-SCNC: 135 MMOL/L (ref 133–144)
SOURCE: ABNORMAL
SP GR UR STRIP: 1.01 (ref 1–1.03)
SQUAMOUS #/AREA URNS AUTO: <1 /HPF (ref 0–1)
UROBILINOGEN UR STRIP-MCNC: NORMAL MG/DL (ref 0–2)
WBC # BLD AUTO: 6.4 10E9/L (ref 4–11)
WBC #/AREA URNS AUTO: 2 /HPF (ref 0–5)

## 2019-03-28 PROCEDURE — 99285 EMERGENCY DEPT VISIT HI MDM: CPT | Mod: 25

## 2019-03-28 PROCEDURE — 81001 URINALYSIS AUTO W/SCOPE: CPT | Performed by: PHYSICIAN ASSISTANT

## 2019-03-28 PROCEDURE — 96374 THER/PROPH/DIAG INJ IV PUSH: CPT | Mod: 59

## 2019-03-28 PROCEDURE — A9270 NON-COVERED ITEM OR SERVICE: HCPCS | Mod: GY | Performed by: INTERNAL MEDICINE

## 2019-03-28 PROCEDURE — A9270 NON-COVERED ITEM OR SERVICE: HCPCS | Mod: GY

## 2019-03-28 PROCEDURE — 25000132 ZZH RX MED GY IP 250 OP 250 PS 637

## 2019-03-28 PROCEDURE — 25000128 H RX IP 250 OP 636

## 2019-03-28 PROCEDURE — G0378 HOSPITAL OBSERVATION PER HR: HCPCS

## 2019-03-28 PROCEDURE — 25000128 H RX IP 250 OP 636: Performed by: PHYSICIAN ASSISTANT

## 2019-03-28 PROCEDURE — 25000132 ZZH RX MED GY IP 250 OP 250 PS 637: Mod: GY | Performed by: INTERNAL MEDICINE

## 2019-03-28 PROCEDURE — 96361 HYDRATE IV INFUSION ADD-ON: CPT

## 2019-03-28 PROCEDURE — 74177 CT ABD & PELVIS W/CONTRAST: CPT

## 2019-03-28 PROCEDURE — 82565 ASSAY OF CREATININE: CPT

## 2019-03-28 PROCEDURE — 85025 COMPLETE CBC W/AUTO DIFF WBC: CPT | Performed by: PHYSICIAN ASSISTANT

## 2019-03-28 PROCEDURE — 80053 COMPREHEN METABOLIC PANEL: CPT | Performed by: PHYSICIAN ASSISTANT

## 2019-03-28 PROCEDURE — 83605 ASSAY OF LACTIC ACID: CPT | Performed by: PHYSICIAN ASSISTANT

## 2019-03-28 PROCEDURE — 99220 ZZC INITIAL OBSERVATION CARE,LEVL III: CPT | Performed by: INTERNAL MEDICINE

## 2019-03-28 PROCEDURE — 25800030 ZZH RX IP 258 OP 636: Performed by: INTERNAL MEDICINE

## 2019-03-28 RX ORDER — ACYCLOVIR 400 MG/1
400 TABLET ORAL 3 TIMES DAILY PRN
COMMUNITY
End: 2020-05-07

## 2019-03-28 RX ORDER — METOPROLOL TARTRATE 25 MG/1
25 TABLET, FILM COATED ORAL EVERY MORNING
Status: DISCONTINUED | OUTPATIENT
Start: 2019-03-29 | End: 2019-03-29 | Stop reason: HOSPADM

## 2019-03-28 RX ORDER — METOPROLOL TARTRATE 25 MG/1
25 TABLET, FILM COATED ORAL EVERY MORNING
COMMUNITY
End: 2021-09-01

## 2019-03-28 RX ORDER — CITALOPRAM HYDROBROMIDE 20 MG/1
20 TABLET ORAL EVERY EVENING
Status: DISCONTINUED | OUTPATIENT
Start: 2019-03-28 | End: 2019-03-29 | Stop reason: HOSPADM

## 2019-03-28 RX ORDER — POLYETHYLENE GLYCOL 3350 17 G/17G
17 POWDER, FOR SOLUTION ORAL DAILY
Status: DISCONTINUED | OUTPATIENT
Start: 2019-03-28 | End: 2019-03-29 | Stop reason: HOSPADM

## 2019-03-28 RX ORDER — METOPROLOL TARTRATE 50 MG
50 TABLET ORAL EVERY EVENING
COMMUNITY
End: 2020-08-14

## 2019-03-28 RX ORDER — LOPERAMIDE HCL 2 MG
2 CAPSULE ORAL 4 TIMES DAILY PRN
Status: DISCONTINUED | OUTPATIENT
Start: 2019-03-28 | End: 2019-03-29 | Stop reason: HOSPADM

## 2019-03-28 RX ORDER — SODIUM CHLORIDE 9 MG/ML
INJECTION, SOLUTION INTRAVENOUS CONTINUOUS
Status: DISCONTINUED | OUTPATIENT
Start: 2019-03-28 | End: 2019-03-29 | Stop reason: HOSPADM

## 2019-03-28 RX ORDER — LEFLUNOMIDE 10 MG/1
20 TABLET ORAL EVERY OTHER DAY
Status: DISCONTINUED | OUTPATIENT
Start: 2019-03-28 | End: 2019-03-29 | Stop reason: HOSPADM

## 2019-03-28 RX ORDER — TRAZODONE HYDROCHLORIDE 50 MG/1
50 TABLET, FILM COATED ORAL AT BEDTIME
Status: DISCONTINUED | OUTPATIENT
Start: 2019-03-28 | End: 2019-03-29 | Stop reason: HOSPADM

## 2019-03-28 RX ORDER — IOPAMIDOL 755 MG/ML
500 INJECTION, SOLUTION INTRAVASCULAR ONCE
Status: COMPLETED | OUTPATIENT
Start: 2019-03-28 | End: 2019-03-28

## 2019-03-28 RX ORDER — ACETAMINOPHEN 325 MG/1
TABLET ORAL
Status: COMPLETED
Start: 2019-03-28 | End: 2019-03-28

## 2019-03-28 RX ORDER — ONDANSETRON 2 MG/ML
INJECTION INTRAMUSCULAR; INTRAVENOUS
Status: COMPLETED
Start: 2019-03-28 | End: 2019-03-28

## 2019-03-28 RX ORDER — ONDANSETRON 4 MG/1
4 TABLET, ORALLY DISINTEGRATING ORAL EVERY 6 HOURS PRN
Status: DISCONTINUED | OUTPATIENT
Start: 2019-03-28 | End: 2019-03-29 | Stop reason: HOSPADM

## 2019-03-28 RX ORDER — TRAZODONE HYDROCHLORIDE 50 MG/1
50 TABLET, FILM COATED ORAL AT BEDTIME
COMMUNITY
End: 2020-08-14

## 2019-03-28 RX ORDER — METOPROLOL TARTRATE 50 MG
50 TABLET ORAL EVERY EVENING
Status: DISCONTINUED | OUTPATIENT
Start: 2019-03-28 | End: 2019-03-29 | Stop reason: HOSPADM

## 2019-03-28 RX ORDER — ONDANSETRON 2 MG/ML
4 INJECTION INTRAMUSCULAR; INTRAVENOUS EVERY 6 HOURS PRN
Status: DISCONTINUED | OUTPATIENT
Start: 2019-03-28 | End: 2019-03-29 | Stop reason: HOSPADM

## 2019-03-28 RX ORDER — ACETAMINOPHEN 325 MG/1
650 TABLET ORAL EVERY 4 HOURS PRN
Status: DISCONTINUED | OUTPATIENT
Start: 2019-03-28 | End: 2019-03-29 | Stop reason: HOSPADM

## 2019-03-28 RX ORDER — NALOXONE HYDROCHLORIDE 0.4 MG/ML
.1-.4 INJECTION, SOLUTION INTRAMUSCULAR; INTRAVENOUS; SUBCUTANEOUS
Status: DISCONTINUED | OUTPATIENT
Start: 2019-03-28 | End: 2019-03-29 | Stop reason: HOSPADM

## 2019-03-28 RX ORDER — PRAVASTATIN SODIUM 10 MG
10 TABLET ORAL DAILY
Status: DISCONTINUED | OUTPATIENT
Start: 2019-03-28 | End: 2019-03-29 | Stop reason: HOSPADM

## 2019-03-28 RX ORDER — ASPIRIN 81 MG/1
81 TABLET ORAL AT BEDTIME
Status: DISCONTINUED | OUTPATIENT
Start: 2019-03-28 | End: 2019-03-29 | Stop reason: HOSPADM

## 2019-03-28 RX ORDER — ACETAMINOPHEN 650 MG/1
650 SUPPOSITORY RECTAL EVERY 4 HOURS PRN
Status: DISCONTINUED | OUTPATIENT
Start: 2019-03-28 | End: 2019-03-29 | Stop reason: HOSPADM

## 2019-03-28 RX ADMIN — ACETAMINOPHEN 650 MG: 325 TABLET, FILM COATED ORAL at 16:07

## 2019-03-28 RX ADMIN — ONDANSETRON 4 MG: 2 INJECTION INTRAMUSCULAR; INTRAVENOUS at 16:07

## 2019-03-28 RX ADMIN — SODIUM CHLORIDE 1000 ML: 9 INJECTION, SOLUTION INTRAVENOUS at 14:47

## 2019-03-28 RX ADMIN — METOPROLOL TARTRATE 50 MG: 50 TABLET, FILM COATED ORAL at 20:48

## 2019-03-28 RX ADMIN — SODIUM CHLORIDE: 9 INJECTION, SOLUTION INTRAVENOUS at 18:51

## 2019-03-28 RX ADMIN — TRAZODONE HYDROCHLORIDE 50 MG: 50 TABLET ORAL at 22:30

## 2019-03-28 RX ADMIN — CITALOPRAM HYDROBROMIDE 20 MG: 20 TABLET ORAL at 20:48

## 2019-03-28 RX ADMIN — ASPIRIN 81 MG: 81 TABLET, COATED ORAL at 22:30

## 2019-03-28 RX ADMIN — IOPAMIDOL 63 ML: 755 INJECTION, SOLUTION INTRAVENOUS at 15:00

## 2019-03-28 RX ADMIN — SODIUM CHLORIDE 57 ML: 9 INJECTION, SOLUTION INTRAVENOUS at 15:00

## 2019-03-28 ASSESSMENT — ENCOUNTER SYMPTOMS
DIARRHEA: 1
FLANK PAIN: 0
DIFFICULTY URINATING: 0
HEMATURIA: 0
DYSURIA: 0
VOMITING: 1
FEVER: 1
NAUSEA: 1
ABDOMINAL PAIN: 1

## 2019-03-28 ASSESSMENT — MIFFLIN-ST. JEOR: SCORE: 1129.57

## 2019-03-28 NOTE — ED NOTES
[facility]  ED Nurse Handoff Report    Emma Jenkins is a 73 year old female   ED Chief complaint: Abdominal Pain  . ED Diagnosis:   Final diagnoses:   Diarrhea, unspecified type   Abdominal pain, lower     Allergies: No Known Allergies    Code Status: Full Code  Activity level - Baseline/Home:  Independent. Activity Level - Current:   Stand with Assist. Lift room needed: No. Bariatric: No   Needed: No   Isolation: No. Infection: Not Applicable.     Vital Signs:   Vitals:    03/28/19 1354   BP: 131/88   Resp: 16   Temp: 100.1  F (37.8  C)   TempSrc: Oral   SpO2: 97%       Cardiac Rhythm:  ,      Pain level: 0-10 Pain Scale: 4  Patient confused: No. Patient Falls Risk: Yes.   Elimination Status: Has voided   Patient Report / Focused Assessment:    Gastrointestinal Gastrointestinal - Gastrointestinal Comment: PT COMES IN WITH ABD. PAIN/N/V, FEVER AND DEHYDRATION. PT SYMPTOMS STARTED LAST FRIDAY AND HAS PROGRESSIVLY GETTING WORSE. PT ALSO REPORTS ALOT OF BELCHING   Tests Performed / Abnormal Results:    CT Abdomen Pelvis w Contrast   Final Result   IMPRESSION:    1. Multifocal areas of decreased attenuation in the right kidney with   irregular shape. These are nonspecific but may be due to acute   pyelonephritis. A little unusual since the overlying renal cortex does   enhance and there is no perinephric stranding. Clinical correlation   for pyelonephritis and recommend follow-up CT abdomen with IV contrast   in 3 months to assess for resolution.   2. Fluid-filled bowel loops without evidence for bowel wall thickening   or obstruction could be related to enteritis or less likely ileus. No   evidence for bowel distention.                     TERESA RON MD        Labs Ordered and Resulted from Time of ED Arrival Up to the Time of Departure from the ED   LACTIC ACID WHOLE BLOOD - Abnormal; Notable for the following components:       Result Value    Lactic Acid 0.6 (*)     All other  components within normal limits   COMPREHENSIVE METABOLIC PANEL - Abnormal; Notable for the following components:    Glucose 103 (*)     Albumin 3.2 (*)     All other components within normal limits   ROUTINE UA WITH MICROSCOPIC - Abnormal; Notable for the following components:    Mucous Urine Present (*)     All other components within normal limits   CBC WITH PLATELETS DIFFERENTIAL   CREATININE POCT   Treatments provided: PIV, LABS, URINE, CT SCAN, BP AND PULSE OX MONITORING, NS BOLUS, TYLENOL, ZOFRAN  Family Comments: AT B EDSIDE  OBS brochure/video discussed/provided to patient:  YES  ED Medications:   Medications   0.9% sodium chloride BOLUS (1,000 mLs Intravenous New Bag 3/28/19 1447)   iopamidol (ISOVUE-370) solution 500 mL (63 mLs Intravenous Given 3/28/19 1500)   0.9% sodium chloride BOLUS (0 mLs Intravenous Stopped 3/28/19 1506)   acetaminophen (TYLENOL) 325 MG tablet (650 mg  Given 3/28/19 1607)   ondansetron (ZOFRAN) 2 MG/ML injection (4 mg  Given 3/28/19 1607)     Drips infusing:  Yes  For the majority of the shift, the patient's behavior Green. Interventions performed were NONE.  Severe Sepsis OR Septic Shock Diagnosis Present: No    ED Nurse Name/Phone Number: Gus Ross RN [phone]  4:38 PM    RECEIVING UNIT ED HANDOFF REVIEW    Above ED Nurse Handoff Report was reviewed: Yes  Reviewed by: Willie Matt on March 28, 2019 at 5:45 PM

## 2019-03-28 NOTE — PHARMACY-ADMISSION MEDICATION HISTORY
Admission medication history interview status for this patient is complete. See [facility] admission navigator for allergy information, prior to admission medications and immunization status.     Medication history interview source(s):Patient  Medication history resources (including written lists, pill bottles, clinic record):None  Primary pharmacy[facility] [place]    Changes made to PTA medication list:  Added: -  Deleted: -  Changed: acyclovir to prn    Actions taken by pharmacist (provider contacted, etc):None     Additional medication history information:None    Medication reconciliation/reorder completed by provider prior to medication history? No    Do you take OTC medications (eg tylenol, ibuprofen, fish oil, eye/ear drops, etc)? yes(Y/N)    For patients on insulin therapy: no (Y/N)  Lantus/levemir/NPH/Mix 70/30 dose:   (Y/N) (see Med list for doses)   Sliding scale Novolog Y/N  If Yes, do you have a baseline novolog pre-meal dose:  units with meals  Patients eat three meals a day:   Y/N    How many episodes of hypoglycemia do you have per week: _______  How many missed doses do you have per week: ______  How many times do you check your blood glucose per day: _______  Do you have a Continuous glucose monitor (CGM)   Y/N (remind pt that not approved for hospital use)   Any Barriers to therapy - Be specific :  cost of medications, comfortable with giving injections (if applicable), comfortable and confident with current diabetes regimen: Y/N ______________      Prior to Admission medications    Medication Sig Last Dose Taking? Auth Provider   acyclovir (ZOVIRAX) 400 MG tablet Take 400 mg by mouth 3 times daily as needed  Yes Unknown, Entered By History   alendronate (FOSAMAX) 70 MG tablet Take 1 tablet (70 mg) by mouth with 8oz water every 7 days 30 minutes before breakfast and remain upright during this time. 3/25/2019 Yes Gene Scott MD   aspirin 81 MG tablet Take 1 tablet (81 mg) by mouth  daily Past Week at hs Yes Gene Scott MD   calcium carb 1250 mg, 500 mg [place],/vitamin D 200 units (OSCAL WITH D) 500-200 MG-UNIT per tablet Take 1 tablet by mouth every morning  Past Month at Unknown time Yes Unknown, Entered By History   Cholecalciferol (VITAMIN D3 PO) Take 2,500 Units by mouth daily  Past Month at Unknown time Yes Reported, Patient   citalopram (CELEXA) 40 MG tablet Take 20 mg by mouth daily  Past Week at pm Yes Reported, Patient   etanercept (ENBREL) 50 MG/ML injection Inject 50 mg Subcutaneous once a week On Saturdays Past Week at Unknown time Yes Reported, Patient   GARLIC PO Take 1 capsule by mouth daily  Past Month at Unknown time Yes Reported, Patient   leflunomide (ARAVA) 10 MG tablet Take 20 mg by mouth every other day  3/26/2019 at am Yes Reported, Patient   metoprolol tartrate (LOPRESSOR) 25 MG tablet Take 25 mg by mouth every morning 3/27/2019 at am Yes Unknown, Entered By History   metoprolol tartrate (LOPRESSOR) 50 MG tablet Take 50 mg by mouth every evening Past Week at Unknown time Yes Unknown, Entered By History   multivitamin, therapeutic (THERA-VIT) TABS Take 1 tablet by mouth daily Past Month at Unknown time Yes Reported, Patient   Omega-3 Fatty Acids (OMEGA-3 FISH OIL PO) Take 1 g by mouth daily  Past Month at Unknown time Yes Reported, Patient   omeprazole (PRILOSEC) 40 MG capsule TAKE ONE CAPSULE BY MOUTH EVERY DAY . TAKE 30-60 MINUTES BEFORE A MEAL 3/27/2019 at am Yes Gene Scott MD   polyethylene glycol (MIRALAX/GLYCOLAX) Packet Take 1 packet by mouth daily Past Week at am Yes Reported, Patient   pravastatin (PRAVACHOL) 10 MG tablet Take 1 tablet (10 mg) by mouth daily 3/27/2019 at am Yes Gene Scott MD   traZODone (DESYREL) 50 MG tablet Take 50 mg by mouth At Bedtime Past Week at Unknown time Yes Unknown, Entered By History

## 2019-03-28 NOTE — ED TRIAGE NOTES
Pt has been ill since Sunday with abdominal pain.  On Tuesday pt started having diarrhea the Wednesday pt started vomiting.

## 2019-03-28 NOTE — ED PROVIDER NOTES
History     Chief Complaint:  Abdominal Pain    The history is provided by the patient.      Emma Jenkins is a 73 year old female with a history of GERD and irregular heart beat who presents to the emergency department with her  for evaluation of abdominal pain. Starting 5 days ago, the patient developed lower central abdominal pain with non-bloody diarrhea beginning 48 hours after pain began. The pain and diarrhea continued over the next few days until she started to also be nauseated with the onset of non-bloody vomiting in addition to her other symptoms. This morning, the patient woke up still complaining of the pain with nausea, vomiting, and diarrhea and the development of a fever in addition to her other symptoms prompted the patient to seek evaluation here in the emergency department. She denies any urinary symptoms. She has not taken anything for the pain. Prior abdominal surgeries include appendectomy and hysterectomy.    Allergies:  NKDA     Medications:    Acyclovir  Alendronate  Aspirin 81 mg  Celexa  Etanercept  Metoprolol  Omeprazole  Miralax  Pravastatin  Trazadone   Vitamin D3    Past Medical History:    Anxiety  Depression  Seropositive rheumatoid arthritis of multiple sites  Cervicalgia  Irregular heart beat  GERD    Past Surgical History:    Appendectomy  Bilateral blepharoplasty  Colonoscopy  Hysterectomy  Orthopedic surgery    Family History:    Colon cancer  Anxiety    Social History:  Former smoker  Positive for alcohol use.  Negative for drug use.  Patient presents with her .  Marital Status:        Review of Systems   Constitutional: Positive for fever.   Gastrointestinal: Positive for abdominal pain, diarrhea, nausea and vomiting.   Genitourinary: Negative for difficulty urinating, dysuria, flank pain, hematuria and urgency.   All other systems reviewed and are negative.       Physical Exam     Patient Vitals for the past 24 hrs:   BP Temp Temp src Heart Rate Resp  SpO2   03/28/19 1354 131/88 100.1  F (37.8  C) Oral 100 16 97 %     Physical Exam  Constitutional: mildly ill appearing, but not toxic.   Head: No external signs of trauma noted to head or face.   Eyes: Pupils are equal, round, and reactive to light. Conjunctiva normal. No scleral icterus.   ENT: lips dry, but MMM. Normal voice.   Neck: normal ROM.    Cardiovascular: Normal rate, regular rhythm, and intact distal pulses.    Respiratory: Effort normal. No respiratory distress. Lungs clear to auscultation bilaterally.   GI: Soft. Tenderness to suprapubic area without rebound or guarding.   Musculoskeletal: No deformities appreciated. Normal ROM. No edema noted.  Neurological: Alert and Oriented x 3. Speech normal. Moves all extremities equally.    Psychiatric: Appropriate mood, affect, and behavior.   Skin: Skin is warm and dry.        Emergency Department Course   Imaging:  Radiographic findings were communicated with the patient who voiced understanding of the findings.    CT Abdomen/Pelvis with IV contrast:   1. Multifocal areas of decreased attenuation in the right kidney with  irregular shape. These are nonspecific but may be due to acute  pyelonephritis. A little unusual since the overlying renal cortex does  enhance and there is no perinephric stranding. Clinical correlation  for pyelonephritis and recommend follow-up CT abdomen with IV contrast  in 3 months to assess for resolution.  2. Fluid-filled bowel loops without evidence for bowel wall thickening  or obstruction could be related to enteritis or less likely ileus. No  evidence for bowel distention.   As per radiology.    Laboratory:  UA with micro: mucous present o/w negative    CBC: WBC: 6.4, HGB: 12.5, PLT: 178  CMP: Glucose 103 (H), Albumin 3.2 (L), o/w WNL (Creatinine: 0.62)  Lactic acid whole blood: 0.6 (L)  Creatinine POCT: 0.6 / GFR >90    Interventions:  1447 NS 1L IV  1607 Tylenol 650 mg tablet PO  1607 Zofran 4 mg IV    Emergency Department  Course:  1430 Nursing notes and vitals reviewed.  I performed an exam of the patient as documented above.     IV inserted. Medicine administered as documented above. Blood drawn. This was sent to the lab for further testing, results above.    The patient provided a urine sample here in the emergency department. This was sent for laboratory testing, findings above.     The patient was sent for a abdomen pelvis CT while in the emergency department, findings above.     1611 I rechecked the patient and discussed the results of her workup thus far.     1645  I consulted with Dr. Fine of the hospitalist services. They are in agreement to accept the patient for admission.    Findings and plan explained to the Patient who consents to admission. Discussed the patient with Dr. Fine, who will admit the patient to an observation bed for further monitoring, evaluation, and treatment.    Impression & Plan    Medical Decision Makin year old female presenting with abdominal pain, diarrhea, fever, vomiting. Differential diagnosis includes gastroenteritis, diverticulitis, UTI, sepsis, electrolyte abnormality, dehydration, among others. She has a low grade fever here and is mildly tachycardic. She does have suprapubic tenderness, but no peritoneal signs. Her lactate is normal. Labs are unremarkable. Urinalysis is negative for infection. CT abd/pelvis was obtained and showed nonspecific findings in right kidney that could be due to pyelo. However, she has no urinary symptoms and her urinalysis is negative so this seems unlikely. CT also noted fluid filled loops of bowel consistent with likely enteritis, which fits with her diarrhea as well. She is still having diarrhea and pain is not taking PO. Therefore, she will be admitted to observation for fluids and symptomatic management.     Critical Care time:  none    Diagnosis:    ICD-10-CM    1. Diarrhea, unspecified type R19.7    2. Abdominal pain, lower R10.30         Disposition:  Admitted to Dr. Fine to an observation bed    Scribe Disclosure:  I, Noris Naun, am serving as a scribe on 3/28/2019 at 2:36 PM to personally document services performed by Cydney Burch PA-C based on my observations and the provider's statements to me.     Noris Magallon  3/28/2019   [facility] EMERGENCY DEPARTMENT       Cydney Burch PA-C  03/28/19 1806

## 2019-03-29 VITALS
WEIGHT: 138.6 LBS | HEART RATE: 74 BPM | TEMPERATURE: 97.3 F | HEIGHT: 65 IN | RESPIRATION RATE: 18 BRPM | BODY MASS INDEX: 23.09 KG/M2 | OXYGEN SATURATION: 93 % | SYSTOLIC BLOOD PRESSURE: 141 MMHG | DIASTOLIC BLOOD PRESSURE: 80 MMHG

## 2019-03-29 PROCEDURE — G0378 HOSPITAL OBSERVATION PER HR: HCPCS

## 2019-03-29 PROCEDURE — 25800030 ZZH RX IP 258 OP 636: Performed by: INTERNAL MEDICINE

## 2019-03-29 PROCEDURE — A9270 NON-COVERED ITEM OR SERVICE: HCPCS | Mod: GY | Performed by: INTERNAL MEDICINE

## 2019-03-29 PROCEDURE — 25000132 ZZH RX MED GY IP 250 OP 250 PS 637: Mod: GY | Performed by: INTERNAL MEDICINE

## 2019-03-29 PROCEDURE — 99217 ZZC OBSERVATION CARE DISCHARGE: CPT | Performed by: PHYSICIAN ASSISTANT

## 2019-03-29 RX ADMIN — SODIUM CHLORIDE: 9 INJECTION, SOLUTION INTRAVENOUS at 04:40

## 2019-03-29 RX ADMIN — OMEPRAZOLE 40 MG: 20 CAPSULE, DELAYED RELEASE ORAL at 08:34

## 2019-03-29 RX ADMIN — ACETAMINOPHEN 650 MG: 325 TABLET, FILM COATED ORAL at 04:38

## 2019-03-29 RX ADMIN — PRAVASTATIN SODIUM 10 MG: 10 TABLET ORAL at 08:34

## 2019-03-29 RX ADMIN — METOPROLOL TARTRATE 25 MG: 25 TABLET ORAL at 08:34

## 2019-03-29 NOTE — DISCHARGE SUMMARY
"[facility] Observation Unit  Discharge Summary        Emma Jenkins MRN# [id number]   YOB: 1945 Age: 73 year old     Date of Admission:  3/28/2019  Date of Discharge:  3/29/2019 10:46 AM  Admitting Physician:  Fransico Fine MD  Discharge Physician: Jaye Henley PA-C  Discharging Service: Hospitalist      Primary Provider: Gene Scott  Primary Care Physician Phone Number: [phone]         Primary Discharge Diagnoses:    Emma Jenkins was admitted on 3/28/2019 for concerns of nausea, vomiting and diarrhea. Consistent with acute gastroenteritis.     1. Acute Gastroenteritis: suspect viral causes. Now resolved.          Secondary Discharge Diagnoses:     Past Medical History:   Diagnosis Date     Anxiety and depression      Arrhythmia      Arthritis     rheumatoid arthritis     GERD (gastroesophageal reflux disease)      Heart arrhythmias      Irregular heart beat     \"fast heart beat when sleeping\"                Code Status:      Full Code        Brief Hospital Summary:        Reason for your hospital stay      You were evaluated in the hospital for gastroenteritis, felt to be from a   virus you have picked up in the past week. Your lab work was normal. Your   CT of the abdomen showed some evidence of enteritis (inflammation). It   also showed a nonspecific irregularity of the right kidney, and radiology   has recommended an outpatient CT of the abdomen with IV contrast in 3   months to see if that area has changed/resolved.             Please refer to initial admission history and physical for further details.   Briefly, Emma Jenkins was admitted on 3/28/2019 for concerns of acute nausea, vomiting and diarrhea. Work up in ED did not show any evidence of bowel obstruction. Pt was registered to the Observation Unit for continued supportive therapy for acute gastroenteritis..     Pt was resuscitated with IV fluids and continued on supportive measures including " anti-emetics and pain control as needed. Labs were reviewed and significant results addressed.  On the day of discharge, symptoms were resolving and pt was able to tolerate PO intake. Vitals were stable, without evidence of orthostasis. Medications were reviewed and adjustments made as necessary. Pt is instructed to follow up as below.            Significant Labs & Imaging During Hospitalization:        Results for orders placed or performed during the hospital encounter of 03/28/19   CT Abdomen Pelvis w Contrast    Narrative    CT ABDOMEN PELVIS WITH CONTRAST 3/28/2019 3:13 PM    TECHNIQUE: Images from diaphragm to pubic symphysis 63mL Isovue-370 IV  contrast  Radiation dose for this scan was reduced using automated exposure  control, adjustment of the mA and/or kV according to patient size, or  iterative reconstruction technique.    HISTORY: Abdominal pain, diverticulitis suspected    COMPARISON: 2/14/2014 noncontrast CT abdomen pelvis    FINDINGS:   Abdomen and Pelvis:  There is an area of low attenuation in the mid to  superior anterior right kidney, irregular contour with enhancement of  overlying cortex approximately 2.9 x 1.7 x 2.3 cm in size. Additional  smaller hypodense right renal lesions also identified. There is no  perinephric stranding or hydronephrosis and enhancement of the kidneys  otherwise appears symmetric .    5.5 cm left renal cyst. Normal-appearing liver, gallbladder, spleen,  pancreas and adrenal glands. Aorto iliac calcification. No periaortic  or pelvic adenopathy. No free fluid. No evidence for colitis or acute  diverticulitis. There is fluid in nondistended small bowel loops and  right colon. Appendix is not visualized and cannot be evaluated. Lung  bases grossly clear, motion artifact. No frankly aggressive bone  lesion.      Impression    IMPRESSION:   1. Multifocal areas of decreased attenuation in the right kidney with  irregular shape. These are nonspecific but may be due to  acute  pyelonephritis. A little unusual since the overlying renal cortex does  enhance and there is no perinephric stranding. Clinical correlation  for pyelonephritis and recommend follow-up CT abdomen with IV contrast  in 3 months to assess for resolution.  2. Fluid-filled bowel loops without evidence for bowel wall thickening  or obstruction could be related to enteritis or less likely ileus. No  evidence for bowel distention.                 TERESA RON MD   CBC with platelets differential   Result Value Ref Range    WBC 6.4 4.0 - 11.0 10e9/L    RBC Count 4.35 3.8 - 5.2 10e12/L    Hemoglobin 12.5 11.7 - 15.7 g/dL    Hematocrit 39.4 35.0 - 47.0 %    MCV 91 78 - 100 fl    MCH 28.7 26.5 - 33.0 pg    MCHC 31.7 31.5 - 36.5 g/dL    RDW 13.1 10.0 - 15.0 %    Platelet Count 178 150 - 450 10e9/L    Diff Method Automated Method     % Neutrophils 68.2 %    % Lymphocytes 17.1 %    % Monocytes 12.7 %    % Eosinophils 1.3 %    % Basophils 0.5 %    % Immature Granulocytes 0.2 %    Nucleated RBCs 0 0 /100    Absolute Neutrophil 4.4 1.6 - 8.3 10e9/L    Absolute Lymphocytes 1.1 0.8 - 5.3 10e9/L    Absolute Monocytes 0.8 0.0 - 1.3 10e9/L    Absolute Eosinophils 0.1 0.0 - 0.7 10e9/L    Absolute Basophils 0.0 0.0 - 0.2 10e9/L    Abs Immature Granulocytes 0.0 0 - 0.4 10e9/L    Absolute Nucleated RBC 0.0    Lactic acid whole blood   Result Value Ref Range    Lactic Acid 0.6 (L) 0.7 - 2.0 mmol/L   Comprehensive metabolic panel   Result Value Ref Range    Sodium 135 133 - 144 mmol/L    Potassium 4.0 3.4 - 5.3 mmol/L    Chloride 105 94 - 109 mmol/L    Carbon Dioxide 27 20 - 32 mmol/L    Anion Gap 3 3 - 14 mmol/L    Glucose 103 (H) 70 - 99 mg/dL    Urea Nitrogen 11 7 - 30 mg/dL    Creatinine 0.62 0.52 - 1.04 mg/dL    GFR Estimate 89 >60 mL/min/[1.73_m2]    GFR Estimate If Black >90 >60 mL/min/[1.73_m2]    Calcium 8.6 8.5 - 10.1 mg/dL    Bilirubin Total 0.4 0.2 - 1.3 mg/dL    Albumin 3.2 (L) 3.4 - 5.0 g/dL    Protein Total 6.8 6.8 - 8.8  g/dL    Alkaline Phosphatase 87 40 - 150 U/L    ALT 42 0 - 50 U/L    AST 31 0 - 45 U/L   UA with Microscopic   Result Value Ref Range    Color Urine Yellow     Appearance Urine Clear     Glucose Urine Negative NEG^Negative mg/dL    Bilirubin Urine Negative NEG^Negative    Ketones Urine Negative NEG^Negative mg/dL    Specific Gravity Urine 1.010 1.003 - 1.035    Blood Urine Negative NEG^Negative    pH Urine 6.0 5.0 - 7.0 pH    Protein Albumin Urine Negative NEG^Negative mg/dL    Urobilinogen mg/dL Normal 0.0 - 2.0 mg/dL    Nitrite Urine Negative NEG^Negative    Leukocyte Esterase Urine Negative NEG^Negative    Source Midstream Urine     WBC Urine 2 0 - 5 /HPF    RBC Urine 1 0 - 2 /HPF    Squamous Epithelial /HPF Urine <1 0 - 1 /HPF    Mucous Urine Present (A) NEG^Negative /LPF   Creatinine POCT   Result Value Ref Range    Creatinine 0.6 0.52 - 1.04 mg/dL    GFR Estimate >90 >60 mL/min/[1.73_m2]    GFR Estimate If Black >90 >60 mL/min/[1.73_m2]           Pending Results:        Unresulted Labs Ordered in the Past 30 Days of this Admission     No orders found for last 61 day(s).              Consultations This Hospital Stay:      No consultations were requested during this admission         Discharge Instructions and Follow-Up:      Follow-up Appointments     Follow-up and recommended labs and tests       Follow up with primary care provider, Gene Scott MD, within 7 days   for hospital follow- up.  The following labs/tests are recommended:   outpatient CT abdomen with IV contrast in 3 months to reassess the   appearance of the right kidney.           Pt instructed to follow up with PCP in 7 days and with Consultant if indicated.   Follow-up Labs None        Discharge Disposition:      Discharged to home         Discharge Medications:        Discharge Medication List as of 3/29/2019 10:11 AM      CONTINUE these medications which have NOT CHANGED    Details   acyclovir (ZOVIRAX) 400 MG tablet Take 400 mg by mouth 3  times daily as needed, Historical      alendronate (FOSAMAX) 70 MG tablet Take 1 tablet (70 mg) by mouth with 8oz water every 7 days 30 minutes before breakfast and remain upright during this time., Disp-12 tablet, R-1, E-Prescribe      aspirin 81 MG tablet Take 1 tablet (81 mg) by mouth daily, Disp-30 tablet, R-0, No Print Out      calcium carb 1250 mg, 500 mg [place],/vitamin D 200 units (OSCAL WITH D) 500-200 MG-UNIT per tablet Take 1 tablet by mouth every morning , Historical      Cholecalciferol (VITAMIN D3 PO) Take 2,500 Units by mouth daily , Historical      citalopram (CELEXA) 40 MG tablet Take 20 mg by mouth daily , Historical      etanercept (ENBREL) 50 MG/ML injection Inject 50 mg Subcutaneous once a week On Saturdays, Historical      GARLIC PO Take 1 capsule by mouth daily , Historical      leflunomide (ARAVA) 10 MG tablet Take 20 mg by mouth every other day , Historical      !! metoprolol tartrate (LOPRESSOR) 25 MG tablet Take 25 mg by mouth every morning, Historical      !! metoprolol tartrate (LOPRESSOR) 50 MG tablet Take 50 mg by mouth every evening, Historical      multivitamin, therapeutic (THERA-VIT) TABS Take 1 tablet by mouth daily, Historical      Omega-3 Fatty Acids (OMEGA-3 FISH OIL PO) Take 1 g by mouth daily , Historical      omeprazole (PRILOSEC) 40 MG capsule TAKE ONE CAPSULE BY MOUTH EVERY DAY . TAKE 30-60 MINUTES BEFORE A MEAL, Disp-90 capsule, R-3, E-Prescribe      polyethylene glycol (MIRALAX/GLYCOLAX) Packet Take 1 packet by mouth daily, Historical      pravastatin (PRAVACHOL) 10 MG tablet Take 1 tablet (10 mg) by mouth daily, Disp-90 tablet, R-0, E-Prescribe      traZODone (DESYREL) 50 MG tablet Take 50 mg by mouth At Bedtime, Historical       !! - Potential duplicate medications found. Please discuss with provider.              Allergies:       No Known Allergies        Condition and Physical on Discharge:      Discharge condition: Stable   Vitals: Blood pressure 141/80, pulse 74,  "temperature 97.3  F (36.3  C), temperature source Oral, resp. rate 18, height 1.651 m (5' 5\"), weight 62.9 kg (138 lb 9.6 oz), SpO2 93 %, not currently breastfeeding.  138 lbs 9.6 oz      GENERAL:  Comfortable.  PSYCH: pleasant, oriented, No acute distress.  HEENT:  PERRLA. Normal conjunctiva, normal hearing, nasal mucosa and Oropharynx are normal.  NECK:  Supple, no neck vein distention, adenopathy or bruits, normal thyroid.  HEART:  Normal S1, S2 with no murmur, no pericardial rub, gallops or S3 or S4.  LUNGS:  Clear to auscultation, normal Respiratory effort. No wheezing, rales or ronchi.  ABDOMEN:  Soft, no hepatosplenomegaly, normal bowel sounds. Non-tender, non distended.   EXTREMITIES:  No pedal edema, +2 pulses bilateral and equal.  SKIN:  Dry to touch, No rash, wound or ulcerations.  NEUROLOGIC:  CN 2-12 intact, BL 5/5 symmetric upper and lower extremity strength, sensation is intact with no focal deficits.     "

## 2019-03-29 NOTE — PLAN OF CARE
PRIMARY DIAGNOSIS: GASTROENTERITIS    OUTPATIENT/OBSERVATION GOALS TO BE MET BEFORE DISCHARGE  1. Orthostatic performed: No    2. Tolerating PO fluid and/or antibiotics (if applicable): Yes    3. Nausea/Vomiting/Diarrhea symptoms improved: No,  no stools since admission    4. Pain status: Improved with use of alternative comfort measures i.e.: positioning    5. Return to near baseline physical activity: No    Discharge Planner Nurse   Safe discharge environment identified: Yes  Barriers to discharge: Yes       Entered by: Allyson Mitchell 03/28/2019 10:21 PM     Please review provider order for any additional goals.   Nurse to notify provider when observation goals have been met and patient is ready for discharge.

## 2019-03-29 NOTE — PLAN OF CARE
PRIMARY DIAGNOSIS: GASTROENTERITIS    OUTPATIENT/OBSERVATION GOALS TO BE MET BEFORE DISCHARGE  1. Orthostatic performed: N/A    2. Tolerating PO fluid and/or antibiotics (if applicable): Yes    3. Nausea/Vomiting/Diarrhea symptoms improved: Yes    4. Pain status: Pain free.    5. Return to near baseline physical activity: Yes    Patient alert and oriented x4. Vitals are Temp: 97.3  F (36.3  C) Temp src: Oral BP: 138/68 Pulse: 74 Heart Rate: 75 Resp: 16 SpO2: 94 % RA. Denies pain and discomfort. Reports intermittent nausea that is not currently present. Tolerating regular diet- able to eat bland foods for dinner. Patient reports last BM early yesterday morning. Enteric precautions maintained until stool sample is able to be collected and tested. NS infusing at 100 ml/hr. Ambulating with standby assistance. Plan to assist with symptom management. Likely to discharge home today if symptoms continue to improve.     Discharge Planner Nurse   Safe discharge environment identified: Yes  Barriers to discharge: No       Entered by: Angelic Cordova 03/29/2019      Please review provider order for any additional goals.   Nurse to notify provider when observation goals have been met and patient is ready for discharge.

## 2019-03-29 NOTE — PLAN OF CARE
PRIMARY DIAGNOSIS: GASTROENTERITIS    OUTPATIENT/OBSERVATION GOALS TO BE MET BEFORE DISCHARGE  1. Orthostatic performed: No    2. Tolerating PO fluid and/or antibiotics (if applicable): Yes    3. Nausea/Vomiting/Diarrhea symptoms improved: Yes    4. Pain status: Pain free.    5. Return to near baseline physical activity: Yes    Discharge Planner Nurse   Safe discharge environment identified: Yes  Barriers to discharge: No       Entered by: Alisa Call 03/29/2019    AOx4. SBA. Denies N/V/D. Fluids at 100. Tolerating regular diet. Plan for probable discharge today  Please review provider order for any additional goals.   Nurse to notify provider when observation goals have been met and patient is ready for discharge.

## 2019-03-29 NOTE — PLAN OF CARE
ROOM # 229      Living Situation (if not independent, order SW consult): Live at home in [facility] name:  : [place]     Activity level at baseline: Independent  Activity level on admit: SBA      Patient registered to observation; given Patient Bill of Rights; given the opportunity to ask questions about observation status and their plan of care.  Patient has been oriented to the observation room, bathroom and call light is in place.    Discussed discharge goals and expectations with patient/family.       OBSERVATION patient IN TIME: 645 p

## 2019-03-29 NOTE — PLAN OF CARE
PRIMARY DIAGNOSIS: GASTROENTERITIS     OUTPATIENT/OBSERVATION GOALS TO BE MET BEFORE DISCHARGE  1. Orthostatic performed: N/A     2. Tolerating PO fluid and/or antibiotics (if applicable): Yes     3. Nausea/Vomiting/Diarrhea symptoms improved: Yes     4. Pain status: Pain free.     5. Return to near baseline physical activity: Yes     Patient alert and oriented x4. Vitals are Temp: 99.2  F (37.3  C) Temp src: Oral BP: 137/79 Pulse: 74 Heart Rate: 78 Resp: 16 SpO2: 94 % RA. Tylenol given for temp. Denies pain and discomfort. Reports intermittent nausea that is not currently present. Tolerating regular diet- able to eat bland foods for dinner. Patient reports last BM early yesterday morning. Enteric precautions maintained until stool sample is able to be collected and tested. NS infusing at 100 ml/hr. Ambulating with standby assistance. Plan to assist with symptom management. Likely to discharge home today if symptoms continue to improve.      Discharge Planner Nurse   Safe discharge environment identified: Yes  Barriers to discharge: No       Entered by: Angelic Cordova 03/29/2019     Please review provider order for any additional goals.   Nurse to notify provider when observation goals have been met and patient is ready for discharge.

## 2019-03-29 NOTE — PLAN OF CARE
Patient's After Visit Summary was reviewed with patient.   Patient verbalized understanding of After Visit Summary, recommended follow up and was given an opportunity to ask questions.   Discharge medications sent home with patient/family: No    Discharged with spouse    OBSERVATION patient END time: 10:20a.m.  Wheelchair ride provided. Pt. Informed of follow up abd CT in 3 months

## 2019-03-29 NOTE — PROGRESS NOTES
Infection Prevention:    Patient placed on Enteric precautions because of diarrhea with possible infectious etiology. Please contact Infection Prevention with any questions/concerns at *[phone].    Stephani Holden, ICP

## 2019-04-04 ENCOUNTER — TRANSFERRED RECORDS (OUTPATIENT)
Dept: HEALTH INFORMATION MANAGEMENT | Facility: CLINIC | Age: 74
End: 2019-04-04

## 2019-04-30 ENCOUNTER — TELEPHONE (OUTPATIENT)
Dept: INTERNAL MEDICINE | Facility: CLINIC | Age: 74
End: 2019-04-30

## 2019-04-30 DIAGNOSIS — K21.9 GASTROESOPHAGEAL REFLUX DISEASE WITHOUT ESOPHAGITIS: ICD-10-CM

## 2019-04-30 NOTE — TELEPHONE ENCOUNTER
Reason for Call: Request for an order or referral:    Order or referral being requested: CT    Date needed: as soon as possible    Has the patient been seen by the PCP for this problem? No    Additional comments: pt was hospitalized macr of this year. She was advised to get a CT scann done. Pt calling for [facility]    Phone number Patient can be reached at:  Home number on file [phone] (home)    Best Time:  any    Can we leave a detailed message on this number?  YES    Call taken on 4/30/2019 at 2:26 PM by Betty Hankins

## 2019-04-30 NOTE — TELEPHONE ENCOUNTER
Reason for Call:  Medication or medication refill:    Do you use a [facility]?  Name of the pharmacy and phone number for the current request:  [facility]    Name of the medication requested: Omeprazole   Other request:    Can we leave a detailed message on this number? YES    Phone number patient can be reached at: Home number on file [phone] (home)    Best Time: any    Call taken on 4/30/2019 at 2:29 PM by Betty Hankins

## 2019-05-01 DIAGNOSIS — E78.5 HYPERLIPIDEMIA LDL GOAL <130: ICD-10-CM

## 2019-05-01 RX ORDER — PRAVASTATIN SODIUM 10 MG
10 TABLET ORAL DAILY
Qty: 90 TABLET | Refills: 1 | Status: SHIPPED | OUTPATIENT
Start: 2019-05-01 | End: 2019-10-04

## 2019-05-01 RX ORDER — OMEPRAZOLE 40 MG/1
CAPSULE, DELAYED RELEASE ORAL
Qty: 90 CAPSULE | Refills: 1 | Status: SHIPPED | OUTPATIENT
Start: 2019-05-01 | End: 2020-01-21

## 2019-05-01 NOTE — TELEPHONE ENCOUNTER
"Requested Prescriptions   Pending Prescriptions Disp Refills     pravastatin (PRAVACHOL) 10 MG tablet  Last refill: 2/6/19  Last office visit: 11/6/18 90 tablet 0     Sig: Take 1 tablet (10 mg) by mouth daily       Statins Protocol Passed - 5/1/2019  9:37 AM        Passed - LDL on file in past 12 months     Recent Labs   Lab Test 02/01/19  0856   *             Passed - No abnormal creatine kinase in past 12 months     No lab results found.             Passed - Recent (12 mo) or future (30 days) visit within the authorizing provider's specialty     Patient had office visit in the last 12 months or has a visit in the next 30 days with authorizing provider or within the authorizing provider's specialty.  See \"Patient Info\" tab in inbasket, or \"Choose Columns\" in Meds & Orders section of the refill encounter.              Passed - Medication is active on med list        Passed - Patient is age 18 or older        Passed - No active pregnancy on record        Passed - No positive pregnancy test in past 12 months          "

## 2019-05-01 NOTE — TELEPHONE ENCOUNTER
"Per Hospital note from 3/28/19:  \"CT of the abdomen showed some evidence of enteritis (inflammation). It   also showed a nonspecific irregularity of the right kidney, and radiology   has recommended an outpatient CT of the abdomen with IV contrast in 3   months to see if that area has changed/resolved\"    There is an order for a CT scan in patient's chart with future date as 6/29/19-9/29/19. When patient calls back, need to inform her that orders have already been placed, and she can call central scheduling to schedule the CT scan at [phone].    "

## 2019-05-01 NOTE — TELEPHONE ENCOUNTER
"Requested Prescriptions   Pending Prescriptions Disp Refills     omeprazole (PRILOSEC) 40 MG DR capsule  Last Written Prescription Date:  8/4/18  Last Fill Quantity: 90,  # refills: 3   Last office visit: 11/6/2018 with prescribing provider:  Mary   Future Office Visit:    90 capsule 3     Sig: TAKE ONE CAPSULE BY MOUTH EVERY DAY . TAKE 30-60 MINUTES BEFORE A MEAL       PPI Protocol Passed - 5/1/2019  8:23 AM        Passed - Not on Clopidogrel (unless Pantoprazole ordered)        Passed - No diagnosis of osteoporosis on record        Passed - Recent (12 mo) or future (30 days) visit within the authorizing provider's specialty     Patient had office visit in the last 12 months or has a visit in the next 30 days with authorizing provider or within the authorizing provider's specialty.  See \"Patient Info\" tab in inbasket, or \"Choose Columns\" in Meds & Orders section of the refill encounter.              Passed - Medication is active on med list        Passed - Patient is age 18 or older        Passed - No active pregnacy on record        Passed - No positive pregnancy test in past 12 months          "

## 2019-05-05 DIAGNOSIS — E78.5 HYPERLIPIDEMIA LDL GOAL <130: ICD-10-CM

## 2019-05-06 ENCOUNTER — MYC MEDICAL ADVICE (OUTPATIENT)
Dept: INTERNAL MEDICINE | Facility: CLINIC | Age: 74
End: 2019-05-06

## 2019-05-06 NOTE — TELEPHONE ENCOUNTER
"Requested Prescriptions   Pending Prescriptions Disp Refills     pravastatin (PRAVACHOL) 10 MG tablet [Pharmacy Med Name: PRAVASTATIN SODIUM 10 MG TAB] 90 tablet 0     Sig: TAKE 1 TABLET BY MOUTH EVERY DAY   Last Written Prescription Date:  05/01/2019  Last Fill Quantity: 90,  # refills: 1   Last office visit: 11/6/2018 with prescribing provider:     Future Office Visit:      Statins Protocol Passed - 5/5/2019  8:28 AM        Passed - LDL on file in past 12 months     Recent Labs   Lab Test 02/01/19  0856   *             Passed - No abnormal creatine kinase in past 12 months     No lab results found.             Passed - Recent (12 mo) or future (30 days) visit within the authorizing provider's specialty     Patient had office visit in the last 12 months or has a visit in the next 30 days with authorizing provider or within the authorizing provider's specialty.  See \"Patient Info\" tab in inbasket, or \"Choose Columns\" in Meds & Orders section of the refill encounter.              Passed - Medication is active on med list        Passed - Patient is age 18 or older        Passed - No active pregnancy on record        Passed - No positive pregnancy test in past 12 months        "

## 2019-05-06 NOTE — TELEPHONE ENCOUNTER
Pt calls stating her pharmacy has only be sending her #30 at a time of the Omeprazole. She states she used to get #90 at a time. This started in January.     Advised her that her Prescription is ordered for #90. Checked her previous Rxs too. They were for #90.     Informed her that it could be due to new plan by her insurance company.   This is PPI and may only cover for part of the year. Advised her to check with insurance or pharmacy. She agrees.   She will only call back if needed.

## 2019-05-06 NOTE — TELEPHONE ENCOUNTER
Last prescription to [facility] mail order pharmacy 5/1/19. Espinelat message sent to patient to clarify.

## 2019-05-07 RX ORDER — PRAVASTATIN SODIUM 10 MG
TABLET ORAL
Qty: 90 TABLET | Refills: 0 | OUTPATIENT
Start: 2019-05-07

## 2019-05-08 ENCOUNTER — HOSPITAL ENCOUNTER (EMERGENCY)
Facility: CLINIC | Age: 74
Discharge: HOME | End: 2019-05-08
Attending: EMERGENCY MEDICINE | Admitting: EMERGENCY MEDICINE
Payer: COMMERCIAL

## 2019-05-08 VITALS
SYSTOLIC BLOOD PRESSURE: 172 MMHG | OXYGEN SATURATION: 98 % | RESPIRATION RATE: 18 BRPM | TEMPERATURE: 98.4 F | DIASTOLIC BLOOD PRESSURE: 99 MMHG | HEART RATE: 67 BPM

## 2019-05-08 DIAGNOSIS — M54.50 ACUTE MIDLINE LOW BACK PAIN WITHOUT SCIATICA: ICD-10-CM

## 2019-05-08 PROCEDURE — 99285 EMERGENCY DEPT VISIT HI MDM: CPT | Mod: 25

## 2019-05-08 PROCEDURE — 96375 TX/PRO/DX INJ NEW DRUG ADDON: CPT

## 2019-05-08 PROCEDURE — 96374 THER/PROPH/DIAG INJ IV PUSH: CPT

## 2019-05-08 PROCEDURE — 96376 TX/PRO/DX INJ SAME DRUG ADON: CPT

## 2019-05-08 PROCEDURE — 25000128 H RX IP 250 OP 636: Performed by: EMERGENCY MEDICINE

## 2019-05-08 RX ORDER — HYDROMORPHONE HYDROCHLORIDE 1 MG/ML
0.5 INJECTION, SOLUTION INTRAMUSCULAR; INTRAVENOUS; SUBCUTANEOUS ONCE
Status: COMPLETED | OUTPATIENT
Start: 2019-05-08 | End: 2019-05-08

## 2019-05-08 RX ORDER — HYDROCODONE BITARTRATE AND ACETAMINOPHEN 5; 325 MG/1; MG/1
1 TABLET ORAL EVERY 4 HOURS PRN
Qty: 14 TABLET | Refills: 0 | Status: SHIPPED | OUTPATIENT
Start: 2019-05-08 | End: 2019-05-11

## 2019-05-08 RX ORDER — KETOROLAC TROMETHAMINE 15 MG/ML
15 INJECTION, SOLUTION INTRAMUSCULAR; INTRAVENOUS ONCE
Status: COMPLETED | OUTPATIENT
Start: 2019-05-08 | End: 2019-05-08

## 2019-05-08 RX ADMIN — Medication 0.5 MG: at 16:06

## 2019-05-08 RX ADMIN — KETOROLAC TROMETHAMINE 15 MG: 15 INJECTION, SOLUTION INTRAMUSCULAR; INTRAVENOUS at 16:06

## 2019-05-08 RX ADMIN — Medication 0.5 MG: at 15:21

## 2019-05-08 ASSESSMENT — ENCOUNTER SYMPTOMS
WEAKNESS: 0
FEVER: 0
NUMBNESS: 0
BACK PAIN: 1
DYSURIA: 0
ABDOMINAL PAIN: 0

## 2019-05-08 NOTE — ED AVS SNAPSHOT
[facility] Emergency Department  [address]  [facility] [address]  Phone:  [phone]  Fax:  [phone]                                    Emma Jenkins   MRN: [id number]    Department:  [facility] Emergency Department   Date of Visit:  5/8/2019           After Visit Summary Signature Page    I have received my discharge instructions, and my questions have been answered. I have discussed any challenges I see with this plan with the nurse or doctor.    ..........................................................................................................................................  Patient/Patient Representative Signature      ..........................................................................................................................................  Patient Representative Print Name and Relationship to Patient    ..................................................               ................................................  Date                                   Time    ..........................................................................................................................................  Reviewed by Signature/Title    ...................................................              ..............................................  Date                                               Time          22EPIC Rev 08/18

## 2019-05-08 NOTE — ED PROVIDER NOTES
History     Chief Complaint:  Back Pain    HPI   Emma Jenkins is a 73 year old female with a history of rheumatoid arthritis, osteoporosis, and chronic low back pain who presents with back pain. 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 She states that since starting this job she has been having progressively worse non-radiating right-sided low back pain that increases in severity with bending over or twisting. Today the patient states that her pain was starting to be too much to manage at home, causing her to present to the ED for evaluation. The patient denies any fevers, abdominal or leg pain, numbness, weakness or any dysuria. She notes that she has some urine leakage at baseline, but otherwise denies any change in urination or bowel movements or any incontinence. The patient denies ant history of diabetes, cancer, dialysis, or IV drug use. She takes a daily aspirin, but is otherwise not on any blood thinners. She has not been taking anything for pain management at home. Of note, the patient has a history of chronic low back pain and follows with her PCP, Dr. Scott for this. She had an MRI of her lumbar spine completed in 2017 for this as well; see results below.    See patient's lumbar spine MRI from 6/20/17 below.    Lumbar Spine without Contrast  1. Moderate degenerative disc disease from L2-L3 through L5-S1.  2. Apophyseal joint degenerative arthrosis and degenerative grade 1  spondylolisthesis at L3-L4 and L4-L5.  3. L5-S1 right lateral recess 0.7 cm disc extrusion or herniation  which appears to compress the right S1 nerve root.  4. Mild or borderline central stenosis at L3-L4.  5. Multilevel foraminal stenosis, greatest and moderate on the left at  L4-L5.  AJITH SLATER MD    Allergies:  No known drug allergies    Medications:    Norco  Trazodone  Zovirax  Fosamax  Aspirin 81 mg tablet  Oscal with  D  Vitamin D3 PO  Celexa  Enbrel  Garlic PO  Arava  Lopressor  Thera-Vit  Omega-3 Fish Oil PO  Prilosec  Miralax/Glycolax  Pravachol  Desyrel    Past Medical History:    Anxiety  Depression  Arrhythmia   Arthritis  GERD  Heart arrhythmias  Irregular heart beat  Seropositive rheumatoid arthritis of multiple sites   Cervicalgia  Gastroenteritis    Past Surgical History:    Appendectomy  Blepharoplasty Bilateral  Colonoscopy x 2  Hysterectomy  Orthopedic Surgery  Remove hardware hand    Family History:    Colon Cancer  Depression   Anxiety  Back Pain    Social History:  Smoking Status: Former Smoker  Alcohol Use: Yes  Patient presents with her .  Marital Status:       Review of Systems   Constitutional: Negative for fever.   Gastrointestinal: Negative for abdominal pain.   Genitourinary: Negative for dysuria.   Musculoskeletal: Positive for back pain.   Neurological: Negative for weakness and numbness.        - Incontinence   All other systems reviewed and are negative.    Physical Exam     Patient Vitals for the past 24 hrs:   BP Temp Temp src Pulse Resp SpO2   05/08/19 1545 (!) 172/99 -- -- 67 -- --   05/08/19 1530 (!) 162/93 -- -- 62 -- --   05/08/19 1515 (!) 155/91 -- -- 68 -- --   05/08/19 1500 (!) 151/98 -- -- 71 -- --   05/08/19 1453 (!) 181/100 -- -- 64 -- --   05/08/19 1419 (!) 147/93 98.4  F (36.9  C) Temporal 68 18 98 %     Physical Exam    General:  Pleasant, age appropriate.  HEENT:   Conjunctiva are normal and without erythema.    NECK:   Supple, no meningismus.     CV:    Regular rate and rhythm     No murmurs, rubs or gallops.      2+ dorsalis pedis pulses bilateral.  PULM:   Clear to auscultation bilateral.      No respiratory distress.  No stridor.  ABD:   Soft, non-tender, non-distendend.      No rebound or guarding.  MSK:   Patient is non-tender over the lumbar spine.      Non-tender at the lumbar paraspinal musculature.      No step-off to the bony spine or overlying lesions.      Moderate focal tenderness at the right SI joint  LYMPH:  No cervical lymphadenopathy.  NEURO:  Strength is 5/5 and sensation is intact to the lower extremities bilateral.        2+ patellar tendon reflexes bilateral.      No clonus and down-going Babinski bilateral.    Negative straight leg raise bilateral  SKIN:   Warm, dry and intact.    PYSCH:   Mood is good and affect is appropriate.    Emergency Department Course     Interventions:    1521: Dilaudid 0.5 mg IV  1060: Toradol 15 mg IV  1606: Dilaudid 0.5 mg IV    Emergency Department Course:  Past medical records, nursing notes, and vitals reviewed.  1505: I performed an exam of the patient and obtained history, as documented above.      Interventions administered as noted above.    1654: I rechecked the patient. Findings and plan explained to the Patient. Patient discharged home with instructions regarding supportive care, medications, and reasons to return. The importance of close follow-up was reviewed.     Impression & Plan      Medical Decision Makin-year-old female with history of chronic intermittent back pain who presents to the emergency department with atraumatic back pain.  She has no findings concerning for cauda equina syndrome or epidural hematoma.  She does take Enbrel for rheumatoid arthritis but her symptoms and examination are not concerning for epidural abscess or discitis.  She has no tenderness over the lumbar spine to draw concern for compression fracture.  Pain isolates the right SI joint.  Her examination is most consistent with spinal stenosis or disc herniation.  Patient's pain remarkably improved in the ED.  She is safe for discharge home with analgesics and close follow-up PCP.    Diagnosis:    ICD-10-CM    1. Acute midline low back pain without sciatica M54.5        Disposition:  Discharged to home.    Discharge Medications:     Medication List      Started    HYDROcodone-acetaminophen 5-325 MG tablet  Commonly known as:   NORCO  1 tablet, Oral, EVERY 4 HOURS PRN              Lacey Eubanks  5/8/2019   [facility] EMERGENCY DEPARTMENT  I, Lacey Eubanks, am serving as a scribe at 5:35 PM on 5/8/2019 to document services personally performed by Leland Hinojosa MD based on my observations and the provider's statements to me.      Leland Hinojosa MD  05/08/19 1822

## 2019-05-10 ENCOUNTER — TELEPHONE (OUTPATIENT)
Dept: INTERNAL MEDICINE | Facility: CLINIC | Age: 74
End: 2019-05-10

## 2019-05-10 DIAGNOSIS — M05.79 SEROPOSITIVE RHEUMATOID ARTHRITIS OF MULTIPLE SITES (H): ICD-10-CM

## 2019-05-10 DIAGNOSIS — M54.50 ACUTE MIDLINE LOW BACK PAIN WITHOUT SCIATICA: Primary | ICD-10-CM

## 2019-05-10 NOTE — TELEPHONE ENCOUNTER
Patient calls requesting an MRI of her lumbar.  Patient was in the ED. Patient in a lot of pain     Would like MRI today if possible.    Ok to call and Lm [phone] or [phone]

## 2019-05-10 NOTE — TELEPHONE ENCOUNTER
Physical therapy ordered. If she would like to provide me with contact information to reach her son, I'd be happy to discuss her care with him on the phone.

## 2019-05-10 NOTE — TELEPHONE ENCOUNTER
Patient called back and wanted the MRI orders today so that she could have the MRI done today.  Patient states that her son is a Pain Specialist and has advised her to have an MRI.    Relayed message below from Dr. Scott and advised they will be contacting her to schedule Physical Therapy.  Please put orders in.    Patient is also going to talk to her son about ordering the MRI for her.

## 2019-05-10 NOTE — TELEPHONE ENCOUNTER
Contacted patient. Informed her P.T was ordered.     She states her son - Chicho Jenkins can be reached at either [phone] (office number) or [phone] (personal number).

## 2019-05-10 NOTE — TELEPHONE ENCOUNTER
Patient seen in ER on 5/8/19 for back pain. 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  Last lumbar MRI done 6/20/2017.     Patient has ER follow-up appointment with Dr. Lozano on 5/13/19. Patient requesting MRI, routed to provider to review.

## 2019-05-10 NOTE — TELEPHONE ENCOUNTER
Spoke with patient's stepson, Dr Chicho Jenkins.   We agreed to proceed with dual orders for PT and MR of the lumbar spine. Orders placed.

## 2019-05-10 NOTE — TELEPHONE ENCOUNTER
Reviewed her past history, including MRI and neurosurgery visit in 2017. Also reviewed her recent ED evaluation.   An MRI is not likely to guide our therapy, at least initially. This is likely related to her recent job with prolonged seated posture, than to a disc or surgical problem.     Would first recommend physical therapy evaluation. If they suggest that an MRI or back specialist consult, I'd be happy to order that.     Please advise pt.

## 2019-05-13 ENCOUNTER — OFFICE VISIT (OUTPATIENT)
Dept: INTERNAL MEDICINE | Facility: CLINIC | Age: 74
End: 2019-05-13
Payer: COMMERCIAL

## 2019-05-13 VITALS
RESPIRATION RATE: 14 BRPM | HEART RATE: 69 BPM | BODY MASS INDEX: 23.57 KG/M2 | SYSTOLIC BLOOD PRESSURE: 141 MMHG | HEIGHT: 65 IN | DIASTOLIC BLOOD PRESSURE: 86 MMHG | OXYGEN SATURATION: 97 % | WEIGHT: 141.5 LBS | TEMPERATURE: 98.7 F

## 2019-05-13 DIAGNOSIS — M85.80 OSTEOPENIA, UNSPECIFIED LOCATION: ICD-10-CM

## 2019-05-13 DIAGNOSIS — M54.50 RIGHT-SIDED LOW BACK PAIN WITHOUT SCIATICA, UNSPECIFIED CHRONICITY: ICD-10-CM

## 2019-05-13 DIAGNOSIS — M53.3 SACROILIAC JOINT PAIN: Primary | ICD-10-CM

## 2019-05-13 PROCEDURE — 99214 OFFICE O/P EST MOD 30 MIN: CPT | Performed by: FAMILY MEDICINE

## 2019-05-13 RX ORDER — ALENDRONATE SODIUM 70 MG/1
TABLET ORAL
Qty: 12 TABLET | Refills: 1 | Status: SHIPPED | OUTPATIENT
Start: 2019-05-13 | End: 2020-02-28

## 2019-05-13 ASSESSMENT — MIFFLIN-ST. JEOR: SCORE: 1142.72

## 2019-05-13 NOTE — PROGRESS NOTES
CHIEF COMPLAINT    Follow-up of low back pain.      HISTORY    Patient is seen in follow-up of a spell of severe right-sided low back pain which required her to go to the emergency room.  She was treated with Dilaudid and Toradol.  She was sent home with [place].  Her pain improved over 3 to 4 days.  An MRI has been ordered.  I will note that the emergency room exam indicated that much of her tenderness seemed to be over the right SI joint.  She was not really complaining of radicular pain at that time.    An MRI from 2017 showed multilevel degenerative disease in the lumbar spine.    She states that she has had 3 of these spells of severe pain.  She is usually very active and would like to avoid these and avoid having to go to the ER.  She does state that she does yoga and she likes to walk and she also has done physical therapy.    Underlying problems are RA and degenerative disease of her spine.    One additional item is that she was asked to take Fosamax at one time but somehow the prescription never got filled so she request the Rx today.      Patient Active Problem List   Diagnosis     Seropositive rheumatoid arthritis of multiple sites (H)     Major depressive disorder, recurrent episode, in full remission (H)     Cervicalgia     Anxiety     Gastroenteritis     Current Outpatient Medications   Medication Sig Dispense Refill     acyclovir (ZOVIRAX) 400 MG tablet Take 400 mg by mouth 3 times daily as needed       alendronate (FOSAMAX) 70 MG tablet Take 1 tablet (70 mg) by mouth with 8oz water every 7 days 30 minutes before breakfast and remain upright during this time. 12 tablet 1     aspirin 81 MG tablet Take 1 tablet (81 mg) by mouth daily 30 tablet 0     calcium carb 1250 mg, 500 mg [place],/vitamin D 200 units (OSCAL WITH D) 500-200 MG-UNIT per tablet Take 1 tablet by mouth every morning        Cholecalciferol (VITAMIN D3 PO) Take 2,500 Units by mouth daily        citalopram (CELEXA) 40 MG tablet Take 20 mg by  "mouth daily        etanercept (ENBREL) 50 MG/ML injection Inject 50 mg Subcutaneous once a week On Saturdays       GARLIC PO Take 1 capsule by mouth daily        leflunomide (ARAVA) 10 MG tablet Take 20 mg by mouth every other day        metoprolol tartrate (LOPRESSOR) 25 MG tablet Take 25 mg by mouth every morning       metoprolol tartrate (LOPRESSOR) 50 MG tablet Take 50 mg by mouth every evening       multivitamin, therapeutic (THERA-VIT) TABS Take 1 tablet by mouth daily       Omega-3 Fatty Acids (OMEGA-3 FISH OIL PO) Take 1 g by mouth daily        omeprazole (PRILOSEC) 40 MG DR capsule TAKE ONE CAPSULE BY MOUTH EVERY DAY . TAKE 30-60 MINUTES BEFORE A MEAL 90 capsule 1     polyethylene glycol (MIRALAX/GLYCOLAX) Packet Take 1 packet by mouth daily       pravastatin (PRAVACHOL) 10 MG tablet Take 1 tablet (10 mg) by mouth daily 90 tablet 1     traZODone (DESYREL) 50 MG tablet Take 50 mg by mouth At Bedtime         REVIEW OF SYSTEMS    No fevers or chills.  No breathing problems or S OB.  No chest pain, palpitations, peripheral edema.  No abdominal pain.      Past Medical History:   Diagnosis Date     Anxiety and depression      Arrhythmia      Arthritis     rheumatoid arthritis     GERD (gastroesophageal reflux disease)      Heart arrhythmias      Irregular heart beat     \"fast heart beat when sleeping\"       EXAM  /86 (BP Location: Right arm, Patient Position: Sitting, Cuff Size: Adult Regular)   Pulse 69   Temp 98.7  F (37.1  C) (Oral)   Resp 14   Ht 1.651 m (5' 5\")   Wt 64.2 kg (141 lb 8 oz)   SpO2 97%   BMI 23.55 kg/m      Patient appears thin and in general quite well.  HEENT unremarkable.  Neck without thyromegaly.  Nonlabored breathing.  Cardiac rhythm regular.  Abdomen nondistended.  Spine flexibility normal for age.  Mild tenderness over right SIJ.  SLRs negative.      (M53.3) Sacroiliac joint pain  (primary encounter diagnosis)  Comment: Discussed.  Plan: Could consider steroid but she seems " to be improving at this point so will manage expectantly.    (M54.5) Right-sided low back pain without sciatica, unspecified chronicity  Comment: See above.  Multilevel lumbar spine disease noted.  Plan:   Her current program with yoga and therapy and walking should be continued.  Occasional use of steroid in the event of a flareup would be reasonable in my opinion.    Patient has a stepson who works for [employer].  There was talk about her having an injection.  We did discuss that it might be a bit tricky to know which level is the one involved but I also made an attempt including showing some diagrams to show her where this SI joint pain is coming from.    (M85.80) Osteopenia, unspecified location  Comment:   Plan: alendronate (FOSAMAX) 70 MG tablet

## 2019-05-14 ENCOUNTER — THERAPY VISIT (OUTPATIENT)
Dept: PHYSICAL THERAPY | Facility: CLINIC | Age: 74
End: 2019-05-14
Payer: COMMERCIAL

## 2019-05-14 ENCOUNTER — TRANSFERRED RECORDS (OUTPATIENT)
Dept: HEALTH INFORMATION MANAGEMENT | Facility: CLINIC | Age: 74
End: 2019-05-14

## 2019-05-14 DIAGNOSIS — M54.50 LUMBAGO: ICD-10-CM

## 2019-05-14 DIAGNOSIS — M54.50 ACUTE MIDLINE LOW BACK PAIN WITHOUT SCIATICA: ICD-10-CM

## 2019-05-14 PROCEDURE — 97161 PT EVAL LOW COMPLEX 20 MIN: CPT | Mod: GP | Performed by: PHYSICAL THERAPIST

## 2019-05-14 PROCEDURE — 97110 THERAPEUTIC EXERCISES: CPT | Mod: GP | Performed by: PHYSICAL THERAPIST

## 2019-05-14 NOTE — PROGRESS NOTES
[facility] Initial Evaluation  Subjective:  The history is provided by the patient. No  was used.   Emma Jenkins is a 73 year old female with a lumbar condition.  Condition occurred with:  Insidious onset.  Condition occurred: for unknown reasons.  This is a recurrent condition  Patient reports a severe, sudden, insidious onset of low back pain while sitting on 5/8/2019.  Patient denies lower extremity symptoms.  Patient went to the emergency department that same day.  Patient has had two other similar symptoms in the past 2 years.  By 5/12/2019 symptoms had mostly resolved.  She had an MRI this morning and followed-up with her PCP.  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  Patient able to walk for an hour yesterday.    Site of Pain: no pain currently - right low back pain at onset.  Radiates to:  No radiation.    Pain Scale: 0/10.  Associated with: none.   Exacerbated by: patient cautious about doing too much bending or sitting. and relieved by rest.  Since onset symptoms are rapidly improving.  Special tests:  MRI.  Previous treatment includes physical therapy.  There was significant improvement following previous treatment.  General health as reported by patient is good.  Pertinent medical history includes:  Depression and rheumatoid arthritis.      Current medications:  Sleep medication, anti-depressants and other (RA meds).  Current occupation is Retired Teacher.  Patient is working in normal job without restrictions.  Primary job tasks include:  Prolonged sitting.    Barriers include:  None as reported by the patient.    Red flags:  None as reported by the patient.                        Objective:  Standing Alignment:        Lumbar:  Lordosis decr            Gait:    Gait Type:  Normal                    Lumbar/SI Evaluation  ROM:    AROM Lumbar:   Flexion:          Min Loss  Ext:                    Major Loss - ROM  improves with repeated movement in standing   Side Bend:        Left:     Right:   Rotation:           Left:     Right:   Side Glide:        Left:  Min Loss    Right:  Min Loss        Strength: Poor/Fair core strength  Lumbar Myotomes:      L2-4 (Quads):  Left:  5    Right:  5  L4 (Ankle DF):  Left:  5    Right:  5  L5 (Great Toe Ext): Left: 5    Right: 5   S1 (Toe Raise):  Left: 5    Right: 5        Neural Tension/Mobility:      Left side:SLR  negative.     Right side:   SLR  negative.         Spinal Segmental Conclusions: Multi-segmental hypomobility                                                       General     ROS    Assessment/Plan:    Patient is a 73 year old female with lumbar complaints.    Patient has the following significant findings with corresponding treatment plan.                Diagnosis 1:  Lumbar derangement   Pain -  self management, education and home program  Decreased ROM/flexibility - therapeutic exercise, therapeutic activity and home program  Decreased strength - therapeutic exercise, therapeutic activities and home program  Impaired muscle performance - neuro re-education and home program  Decreased function - therapeutic activities and home program  Impaired posture - neuro re-education, therapeutic activities and home program    Therapy Evaluation Codes:   1) History comprised of:   Personal factors that impact the plan of care:      None.    Comorbidity factors that impact the plan of care are:      Depression and Rheumatoid arthritis.     Medications impacting care: Anti-depressant and RA Meds.  2) Examination of Body Systems comprised of:   Body structures and functions that impact the plan of care:      Lumbar spine.   Activity limitations that impact the plan of care are:      Bending, Lifting, Sitting and Standing.  3) Clinical presentation characteristics are:   Stable/Uncomplicated.  4) Decision-Making    Low complexity using standardized patient assessment instrument and/or  measureable assessment of functional outcome.  Cumulative Therapy Evaluation is: Low complexity.    Previous and current functional limitations:  (See Goal Flow Sheet for this information)    Short term and Long term goals: (See Goal Flow Sheet for this information)     Communication ability:  Patient appears to be able to clearly communicate and understand verbal and written communication and follow directions correctly.  Treatment Explanation - The following has been discussed with the patient:   RX ordered/plan of care  Anticipated outcomes  Possible risks and side effects  This patient would benefit from PT intervention to resume normal activities.   Rehab potential is good.    Frequency:  1 X week, once daily  Duration:  for 6 weeks  Discharge Plan:  Achieve all LTG.  Independent in home treatment program.  Reach maximal therapeutic benefit.    Please refer to the daily flowsheet for treatment today, total treatment time and time spent performing 1:1 timed codes.

## 2019-05-14 NOTE — LETTER
DEPARTMENT OF HEALTH AND HUMAN SERVICES  CENTERS FOR MEDICARE & MEDICAID SERVICES    PLAN/UPDATED PLAN OF PROGRESS FOR OUTPATIENT REHABILITATION    PATIENTS NAME:  Emma Jenkins   : 1945  PROVIDER NUMBER:    [id number]  HICN:  [id number]   PROVIDER NAME: SUSI HANNON PT  MEDICAL RECORD NUMBER: [id number]   START OF CARE DATE:   19   TYPE:  PT  PRIMARY/TREATMENT DIAGNOSIS:    Acute midline low back pain without sciatica  Lumbago  VISITS FROM START OF CARE:  Rxs Used: 1     [facility] Initial Evaluation  Subjective:  The history is provided by the patient. No  was used.   Emma Jenkins is a 73 year old female with a lumbar condition.  Condition occurred with:  Insidious onset.  Condition occurred: for unknown reasons.  This is a recurrent condition  Patient reports a severe, sudden, insidious onset of low back pain while sitting on 2019.  Patient denies lower extremity symptoms.  Patient went to the emergency department that same day.  Patient has had two other similar symptoms in the past 2 years.  By 2019 symptoms had mostly resolved.  She had an MRI this morning and followed-up with her PCP.  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  Patient able to walk for an hour yesterday.    Site of Pain: no pain currently - right low back pain at onset.  Radiates to:  No radiation.    Pain Scale: 0/10.  Associated with: none.   Exacerbated by: patient cautious about doing too much bending or sitting. and relieved by rest.  Since onset symptoms are rapidly improving.  Special tests:  MRI.  Previous treatment includes physical therapy.  There was significant improvement following previous treatment.  General health as reported by patient is good.  Pertinent medical history includes:  Depression and rheumatoid arthritis.      Current medications:  Sleep medication, anti-depressants and other (RA meds).  Current  occupation is Retired Teacher.  Patient is working in normal job without restrictions.  Primary job tasks include:  Prolonged sitting.    Barriers include:  None as reported by the patient.  Red flags:  None as reported by the patient.    Objective:  Standing Alignment:    Lumbar:  Lordosis decr  Gait:    Gait Type:  Normal                     PATIENTS NAME:  Emma Jenkins   : 1945      Lumbar/SI Evaluation  ROM:    AROM Lumbar:   Flexion:          Min Loss  Ext:                    Major Loss - ROM improves with repeated movement in standing   Side Bend:        Left:     Right:   Rotation:           Left:     Right:   Side Glide:        Left:  Min Loss    Right:  Min Loss      Strength: Poor/Fair core strength  Lumbar Myotomes:    L2-4 (Quads):  Left:  5    Right:  5  L4 (Ankle DF):  Left:  5    Right:  5  L5 (Great Toe Ext): Left: 5    Right: 5   S1 (Toe Raise):  Left: 5    Right: 5  Neural Tension/Mobility:    Left side:SLR  negative.   Right side:   SLR  negative.   Spinal Segmental Conclusions: Multi-segmental hypomobility    Assessment/Plan:    Patient is a 73 year old female with lumbar complaints.    Patient has the following significant findings with corresponding treatment plan.                Diagnosis 1:  Lumbar derangement   Pain -  self management, education and home program  Decreased ROM/flexibility - therapeutic exercise, therapeutic activity and home program  Decreased strength - therapeutic exercise, therapeutic activities and home program  Impaired muscle performance - neuro re-education and home program  Decreased function - therapeutic activities and home program  Impaired posture - neuro re-education, therapeutic activities and home program    Therapy Evaluation Codes:   1) History comprised of:   Personal factors that impact the plan of care:      None.    Comorbidity factors that impact the plan of care are:      Depression and Rheumatoid arthritis.     Medications impacting care:  "Anti-depressant and RA Meds.  2) Examination of Body Systems comprised of:   Body structures and functions that impact the plan of care:      Lumbar spine.   Activity limitations that impact the plan of care are:      Bending, Lifting, Sitting and Standing.  3) Clinical presentation characteristics are:   Stable/Uncomplicated.  4) Decision-Making    Low complexity using standardized patient assessment instrument and/or measureable assessment of functional outcome.  Cumulative Therapy Evaluation is: Low complexity.    PATIENTS NAME:  Emma Jenkins   : 1945      Previous and current functional limitations:  (See Goal Flow Sheet for this information)    Short term and Long term goals: (See Goal Flow Sheet for this information)     Communication ability:  Patient appears to be able to clearly communicate and understand verbal and written communication and follow directions correctly.  Treatment Explanation - The following has been discussed with the patient:   RX ordered/plan of care  Anticipated outcomes  Possible risks and side effects  This patient would benefit from PT intervention to resume normal activities.   Rehab potential is good.    Frequency:  1 X week, once daily  Duration:  for 6 weeks  Discharge Plan:  Achieve all LTG.  Independent in home treatment program.  Reach maximal therapeutic benefit.      Caregiver Signature/Credentials _____________________________ Date ________       Treating Provider: Geoffrey Lane PT     I have reviewed and certified the need for these services and plan of treatment while under my care.        PHYSICIAN'S SIGNATURE:   __________________________________ Date___________     Gene Scott MD    Certification period:  Beginning of Cert date period: 19 to  End of Cert period date: 19     Functional Level Progress Report: Please see attached \"Goal Flow sheet for Functional level.\"    ____X____ Continue Services or       ________ DC Services            "     Service dates: From  SOC Date: 05/14/19 date to present

## 2019-05-19 DIAGNOSIS — E78.5 HYPERLIPIDEMIA LDL GOAL <130: ICD-10-CM

## 2019-05-20 ENCOUNTER — TELEPHONE (OUTPATIENT)
Dept: INTERNAL MEDICINE | Facility: CLINIC | Age: 74
End: 2019-05-20

## 2019-05-20 NOTE — TELEPHONE ENCOUNTER
"Requested Prescriptions   Pending Prescriptions Disp Refills     pravastatin (PRAVACHOL) 10 MG tablet [Pharmacy Med Name: PRAVASTATIN SODIUM 10 MG TAB] 90 tablet 0     Sig: TAKE 1 TABLET BY MOUTH EVERY DAY   Last Written Prescription Date:  05/01/2019  Last Fill Quantity: 90,  # refills: 1   Last office visit: 5/13/2019 with prescribing provider:     Future Office Visit:   Next 5 appointments (look out 90 days)    Jul 30, 2019  9:00 AM CDT  PHYSICAL with Gene Scott MD  [facility] [address]  [facility] [address]  [phone]           Statins Protocol Passed - 5/19/2019  5:12 PM        Passed - LDL on file in past 12 months     Recent Labs   Lab Test 02/01/19  0856   *             Passed - No abnormal creatine kinase in past 12 months     No lab results found.             Passed - Recent (12 mo) or future (30 days) visit within the authorizing provider's specialty     Patient had office visit in the last 12 months or has a visit in the next 30 days with authorizing provider or within the authorizing provider's specialty.  See \"Patient Info\" tab in inbasket, or \"Choose Columns\" in Meds & Orders section of the refill encounter.              Passed - Medication is active on med list        Passed - Patient is age 18 or older        Passed - No active pregnancy on record        Passed - No positive pregnancy test in past 12 months        "

## 2019-05-20 NOTE — TELEPHONE ENCOUNTER
6 month supply sent to mail order on 5/1/19, this request is coming from a [facility]. Need to clarify with patient.    Left a voicemail asking patient to call the clinic back, consent to communicate on file does not allow for detailed messages.

## 2019-05-22 ENCOUNTER — MYC MEDICAL ADVICE (OUTPATIENT)
Dept: INTERNAL MEDICINE | Facility: CLINIC | Age: 74
End: 2019-05-22

## 2019-05-22 DIAGNOSIS — E78.5 HYPERLIPIDEMIA LDL GOAL <130: ICD-10-CM

## 2019-05-22 RX ORDER — PRAVASTATIN SODIUM 10 MG
TABLET ORAL
Qty: 90 TABLET | Refills: 0 | OUTPATIENT
Start: 2019-05-22

## 2019-05-22 NOTE — TELEPHONE ENCOUNTER
"Requested Prescriptions   Pending Prescriptions Disp Refills     pravastatin (PRAVACHOL) 10 MG tablet [Pharmacy Med Name: PRAVASTATIN  Last Written Prescription Date:  5/1/2019  Last Fill Quantity: 90,  # refills: 1   Last office visit: 5/13/2019 with prescribing provider:     Future Office Visit:   Next 5 appointments (look out 90 days)    Jul 30, 2019  9:00 AM CDT  PHYSICAL with [facility] Scott MD  [facility] [address]  [facility] [id number] [phone]        SODIUM 10 MG TAB] 90 tablet 0     Sig: TAKE 1 TABLET BY MOUTH EVERY DAY       Statins Protocol Passed - 5/22/2019  1:29 PM        Passed - LDL on file in past 12 months     Recent Labs   Lab Test 02/01/19  0856   *             Passed - No abnormal creatine kinase in past 12 months     No lab results found.             Passed - Recent (12 mo) or future (30 days) visit within the authorizing provider's specialty     Patient had office visit in the last 12 months or has a visit in the next 30 days with authorizing provider or within the authorizing provider's specialty.  See \"Patient Info\" tab in inbasket, or \"Choose Columns\" in Meds & Orders section of the refill encounter.              Passed - Medication is active on med list        Passed - Patient is age 18 or older        Passed - No active pregnancy on record        Passed - No positive pregnancy test in past 12 months        "

## 2019-05-22 NOTE — TELEPHONE ENCOUNTER
Patient is using the mail order pharmacy. She will let [facility] know this so they stop sending us a request.

## 2019-05-22 NOTE — TELEPHONE ENCOUNTER
Left a voicemail asking patient to call the clinic back, sent [employer] message as well.

## 2019-05-29 DIAGNOSIS — L63.9 ALOPECIA AREATA: Primary | ICD-10-CM

## 2019-05-29 LAB
BASOPHILS # BLD AUTO: 0 10E9/L (ref 0–0.2)
BASOPHILS NFR BLD AUTO: 0.5 %
DIFFERENTIAL METHOD BLD: NORMAL
EOSINOPHIL # BLD AUTO: 0.2 10E9/L (ref 0–0.7)
EOSINOPHIL NFR BLD AUTO: 2.8 %
ERYTHROCYTE [DISTWIDTH] IN BLOOD BY AUTOMATED COUNT: 13.5 % (ref 10–15)
FOLATE SERPL-MCNC: 16.6 NG/ML
HCT VFR BLD AUTO: 38.1 % (ref 35–47)
HGB BLD-MCNC: 12.3 G/DL (ref 11.7–15.7)
LYMPHOCYTES # BLD AUTO: 1.4 10E9/L (ref 0.8–5.3)
LYMPHOCYTES NFR BLD AUTO: 25.3 %
MCH RBC QN AUTO: 28.9 PG (ref 26.5–33)
MCHC RBC AUTO-ENTMCNC: 32.3 G/DL (ref 31.5–36.5)
MCV RBC AUTO: 90 FL (ref 78–100)
MONOCYTES # BLD AUTO: 0.6 10E9/L (ref 0–1.3)
MONOCYTES NFR BLD AUTO: 11.2 %
NEUTROPHILS # BLD AUTO: 3.4 10E9/L (ref 1.6–8.3)
NEUTROPHILS NFR BLD AUTO: 60.2 %
PLATELET # BLD AUTO: 241 10E9/L (ref 150–450)
RBC # BLD AUTO: 4.25 10E12/L (ref 3.8–5.2)
VIT B12 SERPL-MCNC: 827 PG/ML (ref 193–986)
WBC # BLD AUTO: 5.6 10E9/L (ref 4–11)

## 2019-05-29 PROCEDURE — 82306 VITAMIN D 25 HYDROXY: CPT | Mod: GA | Performed by: DERMATOLOGY

## 2019-05-29 PROCEDURE — 82607 VITAMIN B-12: CPT | Performed by: DERMATOLOGY

## 2019-05-29 PROCEDURE — 84630 ASSAY OF ZINC: CPT | Mod: GA | Performed by: DERMATOLOGY

## 2019-05-29 PROCEDURE — 99000 SPECIMEN HANDLING OFFICE-LAB: CPT | Mod: GA | Performed by: DERMATOLOGY

## 2019-05-29 PROCEDURE — 82728 ASSAY OF FERRITIN: CPT | Performed by: DERMATOLOGY

## 2019-05-29 PROCEDURE — 85025 COMPLETE CBC W/AUTO DIFF WBC: CPT | Performed by: DERMATOLOGY

## 2019-05-29 PROCEDURE — 82746 ASSAY OF FOLIC ACID SERUM: CPT | Performed by: DERMATOLOGY

## 2019-05-29 PROCEDURE — 36415 COLL VENOUS BLD VENIPUNCTURE: CPT | Performed by: DERMATOLOGY

## 2019-05-30 LAB
DEPRECATED CALCIDIOL+CALCIFEROL SERPL-MC: 55 UG/L (ref 20–75)
FERRITIN SERPL-MCNC: 47 NG/ML (ref 8–252)

## 2019-06-01 LAB — ZINC SERPL-MCNC: 62 UG/DL (ref 60–120)

## 2019-06-27 PROBLEM — M54.50 LUMBAGO: Status: RESOLVED | Noted: 2019-05-14 | Resolved: 2019-06-27

## 2019-07-01 ENCOUNTER — TRANSFERRED RECORDS (OUTPATIENT)
Dept: HEALTH INFORMATION MANAGEMENT | Facility: CLINIC | Age: 74
End: 2019-07-01

## 2019-07-09 ENCOUNTER — MYC MEDICAL ADVICE (OUTPATIENT)
Dept: INTERNAL MEDICINE | Facility: CLINIC | Age: 74
End: 2019-07-09

## 2019-07-29 ASSESSMENT — ENCOUNTER SYMPTOMS
SORE THROAT: 0
ARTHRALGIAS: 0
EYE PAIN: 0
CONSTIPATION: 0
PARESTHESIAS: 0
NERVOUS/ANXIOUS: 0
JOINT SWELLING: 0
CHILLS: 0
DIARRHEA: 0
PALPITATIONS: 0
HEADACHES: 0
WEAKNESS: 0
COUGH: 0
DYSURIA: 0
ABDOMINAL PAIN: 0
HEMATOCHEZIA: 0
BREAST MASS: 0
DIZZINESS: 0
FEVER: 0
NAUSEA: 0
FREQUENCY: 0
HEARTBURN: 0
SHORTNESS OF BREATH: 0
MYALGIAS: 0
HEMATURIA: 0

## 2019-07-29 ASSESSMENT — ACTIVITIES OF DAILY LIVING (ADL): CURRENT_FUNCTION: NO ASSISTANCE NEEDED

## 2019-07-30 ENCOUNTER — OFFICE VISIT (OUTPATIENT)
Dept: INTERNAL MEDICINE | Facility: CLINIC | Age: 74
End: 2019-07-30
Payer: COMMERCIAL

## 2019-07-30 ENCOUNTER — HOSPITAL ENCOUNTER (OUTPATIENT)
Dept: CT IMAGING | Facility: CLINIC | Age: 74
Discharge: HOME | End: 2019-07-30
Attending: PHYSICIAN ASSISTANT | Admitting: PHYSICIAN ASSISTANT
Payer: COMMERCIAL

## 2019-07-30 VITALS
RESPIRATION RATE: 13 BRPM | BODY MASS INDEX: 23.46 KG/M2 | HEIGHT: 65 IN | TEMPERATURE: 98.3 F | DIASTOLIC BLOOD PRESSURE: 86 MMHG | OXYGEN SATURATION: 96 % | WEIGHT: 140.8 LBS | SYSTOLIC BLOOD PRESSURE: 136 MMHG | HEART RATE: 81 BPM

## 2019-07-30 DIAGNOSIS — R39.15 URINARY URGENCY: ICD-10-CM

## 2019-07-30 DIAGNOSIS — N28.9 RENAL LESION: ICD-10-CM

## 2019-07-30 DIAGNOSIS — E78.5 HYPERLIPIDEMIA LDL GOAL <130: ICD-10-CM

## 2019-07-30 DIAGNOSIS — M05.79 SEROPOSITIVE RHEUMATOID ARTHRITIS OF MULTIPLE SITES (H): ICD-10-CM

## 2019-07-30 DIAGNOSIS — F41.9 ANXIETY: ICD-10-CM

## 2019-07-30 DIAGNOSIS — Z00.00 ENCOUNTER FOR MEDICARE ANNUAL WELLNESS EXAM: Primary | ICD-10-CM

## 2019-07-30 DIAGNOSIS — M85.80 OSTEOPENIA, UNSPECIFIED LOCATION: ICD-10-CM

## 2019-07-30 DIAGNOSIS — H61.20 WAX IN EAR: ICD-10-CM

## 2019-07-30 DIAGNOSIS — L65.9 ALOPECIA: ICD-10-CM

## 2019-07-30 LAB
ALBUMIN UR-MCNC: NEGATIVE MG/DL
APPEARANCE UR: CLEAR
BILIRUB UR QL STRIP: NEGATIVE
COLOR UR AUTO: YELLOW
CREAT BLD-MCNC: 0.7 MG/DL (ref 0.52–1.04)
ERYTHROCYTE [DISTWIDTH] IN BLOOD BY AUTOMATED COUNT: 13.4 % (ref 10–15)
GFR SERPL CREATININE-BSD FRML MDRD: 82 ML/MIN/{1.73_M2}
GLUCOSE UR STRIP-MCNC: NEGATIVE MG/DL
HCT VFR BLD AUTO: 38.5 % (ref 35–47)
HGB BLD-MCNC: 12.4 G/DL (ref 11.7–15.7)
HGB UR QL STRIP: NEGATIVE
KETONES UR STRIP-MCNC: NEGATIVE MG/DL
LEUKOCYTE ESTERASE UR QL STRIP: NEGATIVE
MCH RBC QN AUTO: 28.8 PG (ref 26.5–33)
MCHC RBC AUTO-ENTMCNC: 32.2 G/DL (ref 31.5–36.5)
MCV RBC AUTO: 90 FL (ref 78–100)
NITRATE UR QL: NEGATIVE
PH UR STRIP: 7 PH (ref 5–7)
PLATELET # BLD AUTO: 263 10E9/L (ref 150–450)
RBC # BLD AUTO: 4.3 10E12/L (ref 3.8–5.2)
SOURCE: NORMAL
SP GR UR STRIP: 1.01 (ref 1–1.03)
UROBILINOGEN UR STRIP-ACNC: 0.2 EU/DL (ref 0.2–1)
WBC # BLD AUTO: 5.4 10E9/L (ref 4–11)

## 2019-07-30 PROCEDURE — 82565 ASSAY OF CREATININE: CPT

## 2019-07-30 PROCEDURE — 85027 COMPLETE CBC AUTOMATED: CPT | Performed by: INTERNAL MEDICINE

## 2019-07-30 PROCEDURE — 80061 LIPID PANEL: CPT | Performed by: INTERNAL MEDICINE

## 2019-07-30 PROCEDURE — 36415 COLL VENOUS BLD VENIPUNCTURE: CPT | Performed by: INTERNAL MEDICINE

## 2019-07-30 PROCEDURE — 99213 OFFICE O/P EST LOW 20 MIN: CPT | Mod: 25 | Performed by: INTERNAL MEDICINE

## 2019-07-30 PROCEDURE — 25000128 H RX IP 250 OP 636: Performed by: RADIOLOGY

## 2019-07-30 PROCEDURE — 80053 COMPREHEN METABOLIC PANEL: CPT | Performed by: INTERNAL MEDICINE

## 2019-07-30 PROCEDURE — 84443 ASSAY THYROID STIM HORMONE: CPT | Performed by: INTERNAL MEDICINE

## 2019-07-30 PROCEDURE — 74170 CT ABD WO CNTRST FLWD CNTRST: CPT

## 2019-07-30 PROCEDURE — 81003 URINALYSIS AUTO W/O SCOPE: CPT | Performed by: INTERNAL MEDICINE

## 2019-07-30 PROCEDURE — G0439 PPPS, SUBSEQ VISIT: HCPCS | Performed by: INTERNAL MEDICINE

## 2019-07-30 RX ORDER — IOPAMIDOL 755 MG/ML
100 INJECTION, SOLUTION INTRAVASCULAR ONCE
Status: COMPLETED | OUTPATIENT
Start: 2019-07-30 | End: 2019-07-30

## 2019-07-30 RX ADMIN — IOPAMIDOL 71 ML: 755 INJECTION, SOLUTION INTRAVENOUS at 11:09

## 2019-07-30 RX ADMIN — SODIUM CHLORIDE 48 ML: 9 INJECTION, SOLUTION INTRAVENOUS at 11:09

## 2019-07-30 SDOH — HEALTH STABILITY: MENTAL HEALTH
STRESS IS WHEN SOMEONE FEELS TENSE, NERVOUS, ANXIOUS, OR CAN'T SLEEP AT NIGHT BECAUSE THEIR MIND IS TROUBLED. HOW STRESSED ARE YOU?: NOT AT ALL

## 2019-07-30 SDOH — SOCIAL STABILITY: SOCIAL NETWORK
DO YOU BELONG TO ANY CLUBS OR ORGANIZATIONS SUCH AS CHURCH GROUPS UNIONS, FRATERNAL OR ATHLETIC GROUPS, OR SCHOOL GROUPS?: YES

## 2019-07-30 SDOH — SOCIAL STABILITY: SOCIAL NETWORK: HOW OFTEN DO YOU ATTENT MEETINGS OF THE CLUB OR ORGANIZATION YOU BELONG TO?: MORE THAN 4 TIMES PER YEAR

## 2019-07-30 SDOH — SOCIAL STABILITY: SOCIAL INSECURITY
WITHIN THE LAST YEAR, HAVE TO BEEN RAPED OR FORCED TO HAVE ANY KIND OF SEXUAL ACTIVITY BY YOUR PARTNER OR EX-PARTNER?: NO

## 2019-07-30 SDOH — SOCIAL STABILITY: SOCIAL NETWORK
IN A TYPICAL WEEK, HOW MANY TIMES DO YOU TALK ON THE PHONE WITH FAMILY, FRIENDS, OR NEIGHBORS?: MORE THAN THREE TIMES A WEEK

## 2019-07-30 SDOH — ECONOMIC STABILITY: FOOD INSECURITY: WITHIN THE PAST 12 MONTHS, THE FOOD YOU BOUGHT JUST DIDN'T LAST AND YOU DIDN'T HAVE MONEY TO GET MORE.: NEVER TRUE

## 2019-07-30 SDOH — HEALTH STABILITY: MENTAL HEALTH: HOW OFTEN DO YOU HAVE A DRINK CONTAINING ALCOHOL?: 2-3 TIMES A WEEK

## 2019-07-30 SDOH — HEALTH STABILITY: MENTAL HEALTH: HOW OFTEN DO YOU HAVE 6 OR MORE DRINKS ON ONE OCCASION?: NEVER

## 2019-07-30 SDOH — HEALTH STABILITY: MENTAL HEALTH: HOW MANY STANDARD DRINKS CONTAINING ALCOHOL DO YOU HAVE ON A TYPICAL DAY?: 1 OR 2

## 2019-07-30 SDOH — SOCIAL STABILITY: SOCIAL NETWORK: ARE YOU MARRIED, WIDOWED, DIVORCED, SEPARATED, NEVER MARRIED, OR LIVING WITH A PARTNER?: MARRIED

## 2019-07-30 SDOH — SOCIAL STABILITY: SOCIAL INSECURITY: WITHIN THE LAST YEAR, HAVE YOU BEEN AFRAID OF YOUR PARTNER OR EX-PARTNER?: NO

## 2019-07-30 SDOH — ECONOMIC STABILITY: TRANSPORTATION INSECURITY
IN THE PAST 12 MONTHS, HAS LACK OF TRANSPORTATION KEPT YOU FROM MEETINGS, WORK, OR FROM GETTING THINGS NEEDED FOR DAILY LIVING?: NO

## 2019-07-30 SDOH — SOCIAL STABILITY: SOCIAL INSECURITY
WITHIN THE LAST YEAR, HAVE YOU BEEN KICKED, HIT, SLAPPED, OR OTHERWISE PHYSICALLY HURT BY YOUR PARTNER OR EX-PARTNER?: NO

## 2019-07-30 SDOH — ECONOMIC STABILITY: FOOD INSECURITY: WITHIN THE PAST 12 MONTHS, YOU WORRIED THAT YOUR FOOD WOULD RUN OUT BEFORE YOU GOT MONEY TO BUY MORE.: NEVER TRUE

## 2019-07-30 SDOH — HEALTH STABILITY: PHYSICAL HEALTH: ON AVERAGE, HOW MANY MINUTES DO YOU ENGAGE IN EXERCISE AT THIS LEVEL?: 60 MIN

## 2019-07-30 SDOH — ECONOMIC STABILITY: TRANSPORTATION INSECURITY
IN THE PAST 12 MONTHS, HAS THE LACK OF TRANSPORTATION KEPT YOU FROM MEDICAL APPOINTMENTS OR FROM GETTING MEDICATIONS?: NO

## 2019-07-30 SDOH — SOCIAL STABILITY: SOCIAL INSECURITY: WITHIN THE LAST YEAR, HAVE YOU BEEN HUMILIATED OR EMOTIONALLY ABUSED IN OTHER WAYS BY YOUR PARTNER OR EX-PARTNER?: NO

## 2019-07-30 SDOH — ECONOMIC STABILITY: INCOME INSECURITY: HOW HARD IS IT FOR YOU TO PAY FOR THE VERY BASICS LIKE FOOD, HOUSING, MEDICAL CARE, AND HEATING?: NOT VERY HARD

## 2019-07-30 SDOH — HEALTH STABILITY: PHYSICAL HEALTH: ON AVERAGE, HOW MANY DAYS PER WEEK DO YOU ENGAGE IN MODERATE TO STRENUOUS EXERCISE (LIKE A BRISK WALK)?: 3 DAYS

## 2019-07-30 SDOH — SOCIAL STABILITY: SOCIAL NETWORK: HOW OFTEN DO YOU ATTEND CHURCH OR RELIGIOUS SERVICES?: MORE THAN 4 TIMES PER YEAR

## 2019-07-30 SDOH — SOCIAL STABILITY: SOCIAL NETWORK: HOW OFTEN DO YOU GET TOGETHER WITH FRIENDS OR RELATIVES?: MORE THAN THREE TIMES A WEEK

## 2019-07-30 ASSESSMENT — PATIENT HEALTH QUESTIONNAIRE - PHQ9: SUM OF ALL RESPONSES TO PHQ QUESTIONS 1-9: 0

## 2019-07-30 ASSESSMENT — MIFFLIN-ST. JEOR: SCORE: 1139.54

## 2019-07-30 ASSESSMENT — ENCOUNTER SYMPTOMS
FEVER: 0
HEADACHES: 0
DIZZINESS: 0
HEMATOCHEZIA: 0
EYE PAIN: 0
MYALGIAS: 0
DYSURIA: 0
ARTHRALGIAS: 0
SORE THROAT: 0
HEMATURIA: 0
NERVOUS/ANXIOUS: 0
BREAST MASS: 0
WEAKNESS: 0
PARESTHESIAS: 0
SHORTNESS OF BREATH: 0
CONSTIPATION: 0
JOINT SWELLING: 0
PALPITATIONS: 0
COUGH: 0
ABDOMINAL PAIN: 0
FREQUENCY: 0
NAUSEA: 0
CHILLS: 0
DIARRHEA: 0
HEARTBURN: 0

## 2019-07-30 ASSESSMENT — ACTIVITIES OF DAILY LIVING (ADL): CURRENT_FUNCTION: NO ASSISTANCE NEEDED

## 2019-07-30 NOTE — PROGRESS NOTES
"SUBJECTIVE:   Emma Jenkins is a 73 year old female who presents for Preventive Visit.  In addition to a preventive exam, she has several concerns.   She feels well on citalopram, and feels much less anxious when taking it.   She wonders about her cholesterol, and about whether to take aspirin. She was previously intolerant of a different statin, but has tolerated pravastatin at a dose of 10 mg daily. Her 10-year risk of stroke or heart attack calculates to 16.5 percent. According to AHA/ACC recommendations, aspirin is optional, and pravastatin dosing should be 40 mg daily.   Before raising the pravastatin dose, she would like to see how today's cholesterol results return. We agreed for her to take an aspirin 81 mg tablet three times weekly.   She is concerned about hair loss. She has had some patches of hair regrowth following some treatments by dermatology. Her dermatologist had ordered a Zinc level which returned in low-normal range. Shortly after starting Zinc Sulfate tablets she noted some GI symptoms, and discontinued it. We reviewed that these are common side effects with Zinc Sulfate, and she might look into foods that contain Zinc.   She has noted urinary urgency, without incontinence, dysuria or hematuria. We discussed the difference between stress incontinence and urinary urgency, and agreed to check a UA and offer her a Urology consult.   Are you in the first 12 months of your Medicare coverage?  No    Healthy Habits:     In general, how would you rate your overall health?  Good    Frequency of exercise:  2-3 days/week    Duration of exercise:  30-45 minutes    Do you usually eat at least 4 servings of fruit and vegetables a day, include whole grains    & fiber and avoid regularly eating high fat or \"junk\" foods?  No    Taking medications regularly:  Yes    Barriers to taking medications:  None    Medication side effects:  None    Ability to successfully perform activities of daily living:  No " assistance needed    Home Safety:  No safety concerns identified    Hearing Impairment:  No hearing concerns    In the past 6 months, have you been bothered by leaking of urine?  No    In general, how would you rate your overall mental or emotional health?  Excellent      PHQ-2 Total Score: 0    Additional concerns today:  Yes    Do you feel safe in your environment? Yes    Do you have a Health Care Directive? No: Advance care planning was reviewed with patient; patient declined at this time.      Fall risk  Fallen 2 or more times in the past year?: No  Any fall with injury in the past year?: No    Cognitive Screening   1) Repeat 3 items (Leader, Season, Table)    2) Clock draw: NORMAL  3) 3 item recall: Recalls 3 objects  Results: 3 items recalled: COGNITIVE IMPAIRMENT LESS LIKELY    Mini-CogTM Copyright S Siss[employer]. Licensed by the author for use in [facility]; reprinted with permission ([email or web address]). All rights reserved.      Do you have sleep apnea, excessive snoring or daytime drowsiness?: no    Reviewed and updated as needed this visit by clinical staff  Tobacco  Allergies  Meds  Med Hx  Surg Hx  Fam Hx  Soc Hx        Reviewed and updated as needed this visit by Provider        Social History     Tobacco Use     Smoking status: Former Smoker     Packs/day: 0.50     Years: 10.00     Pack years: 5.00     Types: Cigarettes     Start date: 1965     Last attempt to quit: 1975     Years since quittin.2     Smokeless tobacco: Never Used     Tobacco comment: Would smoke in social settings   Substance Use Topics     Alcohol use: Yes     Alcohol/week: 2.5 oz     Comment: Approximately 4 glasses per week     If you drink alcohol do you typically have >3 drinks per day or >7 drinks per week? No    Alcohol Use 2019   Prescreen: >3 drinks/day or >7 drinks/week? No   Prescreen: >3 drinks/day or >7 drinks/week? -   No flowsheet data found.        PROBLEMS TO ADD ON...    Current providers  sharing in care for this patient include:   Patient Care Team:  Gene Scott MD as PCP - General (Internal Medicine-Hematology & Oncology)  Karsten Malone MD as MD (Internal Medicine)  Gene Scott MD as Assigned PCP    The following health maintenance items are reviewed in Epic and correct as of today:  Health Maintenance   Topic Date Due     DEPRESSION ACTION PLAN  1945     ZOSTER IMMUNIZATION (1 of 2) 11/06/1994     PHQ-9  01/25/2019     MEDICARE ANNUAL WELLNESS VISIT  07/25/2019     INFLUENZA VACCINE (1) 09/01/2019     FALL RISK ASSESSMENT  05/13/2020     MAMMO SCREENING  08/28/2020     ADVANCE CARE PLANNING  07/25/2023     LIPID  02/01/2024     DTAP/TDAP/TD IMMUNIZATION (3 - Td) 10/17/2026     COLONOSCOPY  11/09/2028     DEXA  Completed     IPV IMMUNIZATION  Aged Out     MENINGITIS IMMUNIZATION  Aged Out     Lab work is in process  Pneumonia Vaccine:She has received both pneumonia vaccinations.     Review of Systems   Constitutional: Negative for chills and fever.   HENT: Negative for congestion, ear pain, hearing loss and sore throat.    Eyes: Negative for pain and visual disturbance.   Respiratory: Negative for cough and shortness of breath.    Cardiovascular: Negative for chest pain, palpitations and peripheral edema.   Gastrointestinal: Negative for abdominal pain, constipation, diarrhea, heartburn, hematochezia and nausea.   Breasts:  Negative for tenderness, breast mass and discharge.   Genitourinary: Negative for dysuria, frequency, genital sores, hematuria, pelvic pain, urgency, vaginal bleeding and vaginal discharge.   Musculoskeletal: Negative for arthralgias, joint swelling and myalgias.   Skin: Negative for rash.   Neurological: Negative for dizziness, weakness, headaches and paresthesias.   Psychiatric/Behavioral: Negative for mood changes. The patient is not nervous/anxious.        OBJECTIVE:   Physical Exam   Vitals: /86   Pulse 81   Temp 98.3  F (36.8  C) (Oral)   Resp 13   " Ht 1.651 m (5' 5\")   Wt 63.9 kg (140 lb 12.8 oz)   SpO2 96%   BMI 23.43 kg/m    BMI= Body mass index is 23.43 kg/m .   GENERAL: healthy, alert and no distress  EYES: Eyes grossly normal to inspection, PERRL and conjunctivae and sclerae normal  HENT: ear canals and TM's normal, nose and mouth without ulcers or lesions  NECK: no adenopathy, no asymmetry, masses, or scars and thyroid normal to palpation  RESP: lungs clear to auscultation - no rales, rhonchi or wheezes  BREAST/PELVIC exams: not performed.  CV: regular rate and rhythm, normal S1 S2, no S3 or S4, no murmur, click or rub, no peripheral edema and peripheral pulses strong  ABDOMEN: soft, nontender, no hepatosplenomegaly, no masses and bowel sounds normal  MS: no gross musculoskeletal defects noted, no edema  SKIN: no suspicious lesions or rashes  NEURO: Normal strength and tone, mentation intact and speech normal  PSYCH: mentation appears normal, affect normal/bright    Diagnostic Test Results:  Labs reviewed in Epic    ASSESSMENT / PLAN:   (Z00.00) Encounter for Medicare annual wellness exam  (primary encounter diagnosis)  Comment: Stable health. See epic orders.   Plan: Comprehensive metabolic panel, Lipid panel         reflex to direct LDL Fasting, TSH with free T4         reflex, CBC with platelets    (R39.15) Urinary urgency  Comment: Will check UA and offer Urology consult.   Plan: UROLOGY ADULT REFERRAL, *UA reflex to         Microscopic and Culture ([place] and [place]         [facility]inics (except [place] and [place]),         OFFICE/OUTPT VISIT,EST,LEVL III          (E78.5) Hyperlipidemia LDL goal <130  Comment: See lipid discussion above. Consider raising dose of pravastatin. Check lipids.   Plan: Comprehensive metabolic panel, Lipid panel         reflex to direct LDL Fasting, OFFICE/OUTPT         VISIT,EST,LEVL III        (L65.9) Alopecia  Comment: Check TSH. Continue to follow with dermatology.   Plan: TSH with free T4 reflex, OFFICE/OUTPT     " "    VISIT,EST,LEVL III          (M85.80) Osteopenia, unspecified location  Comment: Continue alendronate.     (H61.20) Wax in ear  Comment: Unable to remove from left ear with curette. Offered ENT consult.   Plan: OTOLARYNGOLOGY REFERRAL    (F41.9) Anxiety  Comment: Well controlled on citalopram, patient wishes to continue.    (M05.79) Seropositive rheumatoid arthritis of multiple sites (H)  Comment: Continue to follow with Rheumatology.       End of Life Planning:  Patient currently has an advanced directive: No.  I have verified the patient's ablity to prepare an advanced directive/make health care decisions.  Literature was provided to assist patient in preparing an advanced directive.    COUNSELING:  Reviewed preventive health counseling, as reflected in patient instructions    Estimated body mass index is 23.55 kg/m  as calculated from the following:    Height as of 5/13/19: 1.651 m (5' 5\").    Weight as of 5/13/19: 64.2 kg (141 lb 8 oz).         reports that she quit smoking about 44 years ago. Her smoking use included cigarettes. She started smoking about 54 years ago. She has a 5.00 pack-year smoking history. She has never used smokeless tobacco.      Appropriate preventive services were discussed with this patient, including applicable screening as appropriate for cardiovascular disease, diabetes, osteopenia/osteoporosis, and glaucoma.  As appropriate for age/gender, discussed screening for colorectal cancer, prostate cancer, breast cancer, and cervical cancer. Checklist reviewing preventive services available has been given to the patient.    Patient Instructions     Patient Education        Going by your risk profile for future stroke/heart attack, the optimal dose of pravastatin (if we could reach it without muscle aching) would be 40 mg daily.   Let's see what today's cholesterol looks like first.     You might take a baby aspirin (81 mg) three times a week.     No additional ideas regarding hair loss. " You could try taking a Zinc tablet once per week, or look up good dietary Zinc sources on the internet.     For bladder symptoms, the Urology specialist is the most helpful. Contact information provided.     Colonoscopy--probably in 2021 to 2023.     Keep seeing Rheumatology and [facility] as they advise.     Otherwise everything looks fine!    Refills of medications will be faxed to your pharmacy when they request.     I'll get back to you with lab results soon, especially if there is anything of concern.      See you in a year, sooner if problems.       Reviewed patients plan of care and provided an AVS. The Basic Care Plan (routine screening as documented in Health Maintenance) for Emma meets the Care Plan requirement. This Care Plan has been established and reviewed with the Patient.    Counseling Resources:  ATP IV Guidelines  Pooled Cohorts Equation Calculator  Breast Cancer Risk Calculator  FRAX Risk Assessment  ICSI Preventive Guidelines  Dietary Guidelines for Americans, 2010  USDA's MyPlate  ASA Prophylaxis  Lung CA Screening    Gene Scott MD, MD  [facility]    Identified Health Risks:

## 2019-07-30 NOTE — PATIENT INSTRUCTIONS
Patient Education        Going by your risk profile for future stroke/heart attack, the optimal dose of pravastatin (if we could reach it without muscle aching) would be 40 mg daily.   Let's see what today's cholesterol looks like first.     You might take a baby aspirin (81 mg) three times a week.     No additional ideas regarding hair loss. You could try taking a Zinc tablet once per week, or look up good dietary Zinc sources on the internet.     For bladder symptoms, the Urology specialist is the most helpful. Contact information provided.     Colonoscopy--probably in 2021 to 2023.     Keep seeing Rheumatology and [facility] as they advise.     Otherwise everything looks fine!    Refills of medications will be faxed to your pharmacy when they request.     I'll get back to you with lab results soon, especially if there is anything of concern.      See you in a year, sooner if problems.

## 2019-07-31 LAB
ALBUMIN SERPL-MCNC: 3.3 G/DL (ref 3.4–5)
ALP SERPL-CCNC: 91 U/L (ref 40–150)
ALT SERPL W P-5'-P-CCNC: 30 U/L (ref 0–50)
ANION GAP SERPL CALCULATED.3IONS-SCNC: 6 MMOL/L (ref 3–14)
AST SERPL W P-5'-P-CCNC: 25 U/L (ref 0–45)
BILIRUB SERPL-MCNC: 0.4 MG/DL (ref 0.2–1.3)
BUN SERPL-MCNC: 12 MG/DL (ref 7–30)
CALCIUM SERPL-MCNC: 9 MG/DL (ref 8.5–10.1)
CHLORIDE SERPL-SCNC: 108 MMOL/L (ref 94–109)
CHOLEST SERPL-MCNC: 205 MG/DL
CO2 SERPL-SCNC: 27 MMOL/L (ref 20–32)
CREAT SERPL-MCNC: 0.7 MG/DL (ref 0.52–1.04)
GFR SERPL CREATININE-BSD FRML MDRD: 85 ML/MIN/{1.73_M2}
GLUCOSE SERPL-MCNC: 87 MG/DL (ref 70–99)
HDLC SERPL-MCNC: 49 MG/DL
LDLC SERPL CALC-MCNC: 139 MG/DL
NONHDLC SERPL-MCNC: 156 MG/DL
POTASSIUM SERPL-SCNC: 4.5 MMOL/L (ref 3.4–5.3)
PROT SERPL-MCNC: 7 G/DL (ref 6.8–8.8)
SODIUM SERPL-SCNC: 141 MMOL/L (ref 133–144)
TRIGL SERPL-MCNC: 87 MG/DL
TSH SERPL DL<=0.005 MIU/L-ACNC: 1.63 MU/L (ref 0.4–4)

## 2019-09-10 ENCOUNTER — HOSPITAL ENCOUNTER (OUTPATIENT)
Dept: MAMMOGRAPHY | Facility: CLINIC | Age: 74
Discharge: HOME | End: 2019-09-10
Attending: INTERNAL MEDICINE | Admitting: INTERNAL MEDICINE
Payer: COMMERCIAL

## 2019-09-10 ENCOUNTER — MYC MEDICAL ADVICE (OUTPATIENT)
Dept: INTERNAL MEDICINE | Facility: CLINIC | Age: 74
End: 2019-09-10

## 2019-09-10 DIAGNOSIS — Z12.31 VISIT FOR SCREENING MAMMOGRAM: ICD-10-CM

## 2019-09-10 PROCEDURE — 77063 BREAST TOMOSYNTHESIS BI: CPT

## 2019-10-04 DIAGNOSIS — R00.0 TACHYCARDIA: ICD-10-CM

## 2019-10-04 DIAGNOSIS — E78.5 HYPERLIPIDEMIA LDL GOAL <130: ICD-10-CM

## 2019-10-04 NOTE — TELEPHONE ENCOUNTER
"Requested Prescriptions   Pending Prescriptions Disp Refills     metoprolol tartrate (LOPRESSOR) 50 MG tablet [Pharmacy Med Name: METOPROLOL TARTRATE 50MG TABS] 135 tablet 1     Sig: TAKE ONE-HALF TABLET BY MOUTH EVERY MORNING AND TAKE ONE TABLET BY MOUTH EVERY NIGHT AT BEDTIME   Last Written Prescription Date:  historic  Last Fill Quantity: n/a,  # refills: n/a   Last office visit: 7/30/2019 with prescribing provider:     Future Office Visit:      Beta-Blockers Protocol Passed - 10/4/2019 12:15 PM        Passed - Blood pressure under 140/90 in past 12 months     BP Readings from Last 3 Encounters:   07/30/19 136/86   05/13/19 141/86   05/08/19 (!) 172/99                 Passed - Patient is age 6 or older        Passed - Recent (12 mo) or future (30 days) visit within the authorizing provider's specialty     Patient has had an office visit with the authorizing provider or a provider within the authorizing providers department within the previous 12 mos or has a future within next 30 days. See \"Patient Info\" tab in inbasket, or \"Choose Columns\" in Meds & Orders section of the refill encounter.              Passed - Medication is active on med list        pravastatin (PRAVACHOL) 10 MG tablet [Pharmacy Med Name: PRAVASTATIN SODIUM 10MG TABS] 90 tablet 1     Sig: TAKE ONE TABLET BY MOUTH ONCE DAILY   Last Written Prescription Date:  05/01/2019  Last Fill Quantity: 90,  # refills: 01   Last office visit: 7/30/2019 with prescribing provider:     Future Office Visit:      Statins Protocol Passed - 10/4/2019 12:15 PM        Passed - LDL on file in past 12 months     Recent Labs   Lab Test 07/30/19  1001   *             Passed - No abnormal creatine kinase in past 12 months     No lab results found.             Passed - Recent (12 mo) or future (30 days) visit within the authorizing provider's specialty     Patient has had an office visit with the authorizing provider or a provider within the authorizing providers " "department within the previous 12 mos or has a future within next 30 days. See \"Patient Info\" tab in inbasket, or \"Choose Columns\" in Meds & Orders section of the refill encounter.              Passed - Medication is active on med list        Passed - Patient is age 18 or older        Passed - No active pregnancy on record        Passed - No positive pregnancy test in past 12 months        "

## 2019-10-08 RX ORDER — PRAVASTATIN SODIUM 10 MG
TABLET ORAL
Qty: 90 TABLET | Refills: 2 | Status: SHIPPED | OUTPATIENT
Start: 2019-10-08 | End: 2020-07-15

## 2019-10-08 RX ORDER — METOPROLOL TARTRATE 50 MG
TABLET ORAL
Qty: 135 TABLET | Refills: 1 | Status: SHIPPED | OUTPATIENT
Start: 2019-10-08 | End: 2020-04-22

## 2019-10-08 NOTE — TELEPHONE ENCOUNTER
Metoprolol  Routing refill request to provider for review/approval because:  Medication is reported/historical    Pravastatin  Prescription approved per [facility] Refill Protocol.

## 2019-10-28 ENCOUNTER — TRANSFERRED RECORDS (OUTPATIENT)
Dept: HEALTH INFORMATION MANAGEMENT | Facility: CLINIC | Age: 74
End: 2019-10-28

## 2020-01-21 DIAGNOSIS — K21.9 GASTROESOPHAGEAL REFLUX DISEASE WITHOUT ESOPHAGITIS: ICD-10-CM

## 2020-01-21 RX ORDER — OMEPRAZOLE 40 MG/1
CAPSULE, DELAYED RELEASE ORAL
Qty: 90 CAPSULE | Refills: 1 | Status: SHIPPED | OUTPATIENT
Start: 2020-01-21 | End: 2020-07-15

## 2020-01-21 NOTE — TELEPHONE ENCOUNTER
"Requested Prescriptions   Pending Prescriptions Disp Refills     omeprazole (PRILOSEC) 40 MG DR capsule [Pharmacy Med Name: OMEPRAZOLE 40MG CPDR] 90 capsule 1     Sig: TAKE 1 CAPSULE BY MOUTH EVERY DAY, TAKE 30-60 MINUTES BEFORE EATING       PPI Protocol Passed - 1/21/2020  2:28 PM        Passed - Not on Clopidogrel (unless Pantoprazole ordered)        Passed - No diagnosis of osteoporosis on record        Passed - Recent (12 mo) or future (30 days) visit within the authorizing provider's specialty     Patient has had an office visit with the authorizing provider or a provider within the authorizing providers department within the previous 12 mos or has a future within next 30 days. See \"Patient Info\" tab in inbasket, or \"Choose Columns\" in Meds & Orders section of the refill encounter.              Passed - Medication is active on med list        Passed - Patient is age 18 or older        Passed - No active pregnacy on record        Passed - No positive pregnancy test in past 12 months          "

## 2020-01-27 ENCOUNTER — TRANSFERRED RECORDS (OUTPATIENT)
Dept: HEALTH INFORMATION MANAGEMENT | Facility: CLINIC | Age: 75
End: 2020-01-27

## 2020-02-08 ENCOUNTER — HEALTH MAINTENANCE LETTER (OUTPATIENT)
Age: 75
End: 2020-02-08

## 2020-02-27 DIAGNOSIS — M85.80 OSTEOPENIA, UNSPECIFIED LOCATION: ICD-10-CM

## 2020-02-28 RX ORDER — ALENDRONATE SODIUM 70 MG/1
TABLET ORAL
Qty: 12 TABLET | Refills: 1 | Status: SHIPPED | OUTPATIENT
Start: 2020-02-28 | End: 2020-08-14

## 2020-02-28 NOTE — TELEPHONE ENCOUNTER
"Requested Prescriptions   Pending Prescriptions Disp Refills     alendronate (FOSAMAX) 70 MG tablet 12 tablet 1     Sig: Take 1 tablet (70 mg) by mouth with 8oz water every 7 days 30 minutes before breakfast and remain upright during this time.       Bisphosphonates Passed - 2/27/2020  9:34 AM        Passed - Recent (12 mo) or future (30 days) visit within the authorizing provider's specialty     Patient has had an office visit with the authorizing provider or a provider within the authorizing providers department within the previous 12 mos or has a future within next 30 days. See \"Patient Info\" tab in inbasket, or \"Choose Columns\" in Meds & Orders section of the refill encounter.              Passed - Dexa on file within past 2 years     Please review last Dexa result.           Passed - Medication is active on med list        Passed - Patient is age 18 or older        Passed - Normal serum creatinine on file within past 12 months     Recent Labs   Lab Test 07/30/19  1101 07/30/19  1001   CR  --  0.70   CREAT 0.7  --                Per provider's last office visit dictation 7-30-19:  \"(M85.80) Osteopenia, unspecified location  Comment: Continue alendronate.\"    Prescription approved per [facility].     "

## 2020-04-23 RX ORDER — METOPROLOL TARTRATE 50 MG
50 TABLET ORAL EVERY EVENING
OUTPATIENT
Start: 2020-04-23

## 2020-04-23 NOTE — TELEPHONE ENCOUNTER
"Routing refill request to provider for review/approval because:  Medication is reported/historical      Requested Prescriptions   Pending Prescriptions Disp Refills     metoprolol tartrate (LOPRESSOR) 50 MG tablet       Sig: Take 1 tablet (50 mg) by mouth every evening       Beta-Blockers Protocol Passed - 4/21/2020  2:59 PM        Passed - Blood pressure under 140/90 in past 12 months     BP Readings from Last 3 Encounters:   07/30/19 136/86   05/13/19 141/86   05/08/19 (!) 172/99                 Passed - Patient is age 6 or older        Passed - Recent (12 mo) or future (30 days) visit within the authorizing provider's specialty     Patient has had an office visit with the authorizing provider or a provider within the authorizing providers department within the previous 12 mos or has a future within next 30 days. See \"Patient Info\" tab in inbasket, or \"Choose Columns\" in Meds & Orders section of the refill encounter.              Passed - Medication is active on med list             "

## 2020-05-07 ENCOUNTER — MYC MEDICAL ADVICE (OUTPATIENT)
Dept: INTERNAL MEDICINE | Facility: CLINIC | Age: 75
End: 2020-05-07

## 2020-05-07 DIAGNOSIS — R42 DIZZINESS: ICD-10-CM

## 2020-05-07 DIAGNOSIS — B00.9 RECURRENT HSV (HERPES SIMPLEX VIRUS): Primary | ICD-10-CM

## 2020-05-07 DIAGNOSIS — G47.00 INSOMNIA, UNSPECIFIED TYPE: ICD-10-CM

## 2020-05-07 DIAGNOSIS — F33.42 MAJOR DEPRESSIVE DISORDER, RECURRENT EPISODE, IN FULL REMISSION (H): ICD-10-CM

## 2020-05-07 RX ORDER — CITALOPRAM HYDROBROMIDE 20 MG/1
20 TABLET ORAL DAILY
Qty: 90 TABLET | Refills: 0 | Status: SHIPPED | OUTPATIENT
Start: 2020-05-07 | End: 2020-08-14

## 2020-05-07 RX ORDER — MECLIZINE HYDROCHLORIDE 25 MG/1
25 TABLET ORAL 3 TIMES DAILY PRN
Qty: 30 TABLET | Refills: 3 | Status: SHIPPED | OUTPATIENT
Start: 2020-05-07 | End: 2021-12-20

## 2020-05-07 RX ORDER — TRAZODONE HYDROCHLORIDE 50 MG/1
50 TABLET, FILM COATED ORAL AT BEDTIME
Qty: 90 TABLET | Refills: 0 | Status: SHIPPED | OUTPATIENT
Start: 2020-05-07 | End: 2020-08-14

## 2020-05-07 RX ORDER — ACYCLOVIR 400 MG/1
400 TABLET ORAL 3 TIMES DAILY
Qty: 15 TABLET | Refills: 3 | Status: SHIPPED | OUTPATIENT
Start: 2020-05-07 | End: 2022-10-07

## 2020-05-07 NOTE — TELEPHONE ENCOUNTER
Please see patient's mychart message below.    1.  Acyclovir 400mg 1 tab TID #15 tabs with 5 refills on 7-3-18.  Prescription pended.    Routing refill request to provider for review/approval because:  Drug not active on patient's medication list    2.  Meclizine 25mg 1 tab TID as needed for dizziness #30 tabs with 1 refill on 2-6-19.  Prescription pended.    Routing refill request to provider for review/approval because:  Drug not active on patient's medication list    Also requesting prescriptions for Trazodone 50mg daily and Citalopram 20mg daily.    Last office visit 7-30-19 for medicare wellness exam.    Should patient schedule a telephone visit to discuss and obtain prescriptions?    Please advise, thanks.    Please advise, thanks.

## 2020-05-07 NOTE — TELEPHONE ENCOUNTER
Please see message below from RN.  Patient is also asking for Trazodone and Citalopram.  Originally prescribed by her psychiatrist.    Please advise, thanks.

## 2020-05-07 NOTE — TELEPHONE ENCOUNTER
She should schedule an annual wellness exam for August. Will refill Rx's for three months.     Please advise pt.

## 2020-06-30 ENCOUNTER — TRANSFERRED RECORDS (OUTPATIENT)
Dept: HEALTH INFORMATION MANAGEMENT | Facility: CLINIC | Age: 75
End: 2020-06-30

## 2020-06-30 LAB
ALT SERPL-CCNC: 26 IU/L (ref 5–35)
AST SERPL-CCNC: 31 U/L (ref 5–34)
CREAT SERPL-MCNC: 0.63 MG/DL (ref 0.5–1.3)

## 2020-08-14 ENCOUNTER — OFFICE VISIT (OUTPATIENT)
Dept: INTERNAL MEDICINE | Facility: CLINIC | Age: 75
End: 2020-08-14
Payer: COMMERCIAL

## 2020-08-14 VITALS
WEIGHT: 147 LBS | BODY MASS INDEX: 24.49 KG/M2 | OXYGEN SATURATION: 98 % | SYSTOLIC BLOOD PRESSURE: 130 MMHG | RESPIRATION RATE: 16 BRPM | HEART RATE: 70 BPM | HEIGHT: 65 IN | DIASTOLIC BLOOD PRESSURE: 82 MMHG

## 2020-08-14 DIAGNOSIS — K21.9 GASTROESOPHAGEAL REFLUX DISEASE WITHOUT ESOPHAGITIS: ICD-10-CM

## 2020-08-14 DIAGNOSIS — M20.62 TOE DEFORMITY, ACQUIRED, LEFT: ICD-10-CM

## 2020-08-14 DIAGNOSIS — Z00.00 ENCOUNTER FOR MEDICARE ANNUAL WELLNESS EXAM: Primary | ICD-10-CM

## 2020-08-14 DIAGNOSIS — E78.5 HYPERLIPIDEMIA LDL GOAL <130: ICD-10-CM

## 2020-08-14 DIAGNOSIS — G47.00 INSOMNIA, UNSPECIFIED TYPE: ICD-10-CM

## 2020-08-14 DIAGNOSIS — R00.0 TACHYCARDIA: ICD-10-CM

## 2020-08-14 DIAGNOSIS — M05.79 SEROPOSITIVE RHEUMATOID ARTHRITIS OF MULTIPLE SITES (H): ICD-10-CM

## 2020-08-14 DIAGNOSIS — H91.93 BILATERAL HEARING LOSS, UNSPECIFIED HEARING LOSS TYPE: ICD-10-CM

## 2020-08-14 DIAGNOSIS — H61.22 EXCESSIVE EAR WAX, LEFT: ICD-10-CM

## 2020-08-14 DIAGNOSIS — M85.80 OSTEOPENIA, UNSPECIFIED LOCATION: ICD-10-CM

## 2020-08-14 DIAGNOSIS — Z12.31 VISIT FOR SCREENING MAMMOGRAM: ICD-10-CM

## 2020-08-14 DIAGNOSIS — R03.0 ELEVATED BLOOD PRESSURE READING WITHOUT DIAGNOSIS OF HYPERTENSION: ICD-10-CM

## 2020-08-14 DIAGNOSIS — F33.42 MAJOR DEPRESSIVE DISORDER, RECURRENT EPISODE, IN FULL REMISSION (H): ICD-10-CM

## 2020-08-14 DIAGNOSIS — Z79.899 MEDICATION MANAGEMENT: ICD-10-CM

## 2020-08-14 LAB
ERYTHROCYTE [DISTWIDTH] IN BLOOD BY AUTOMATED COUNT: 13.3 % (ref 10–15)
HCT VFR BLD AUTO: 38.2 % (ref 35–47)
HGB BLD-MCNC: 12.1 G/DL (ref 11.7–15.7)
MCH RBC QN AUTO: 28.7 PG (ref 26.5–33)
MCHC RBC AUTO-ENTMCNC: 31.7 G/DL (ref 31.5–36.5)
MCV RBC AUTO: 91 FL (ref 78–100)
PLATELET # BLD AUTO: 227 10E9/L (ref 150–450)
RBC # BLD AUTO: 4.21 10E12/L (ref 3.8–5.2)
WBC # BLD AUTO: 5.2 10E9/L (ref 4–11)

## 2020-08-14 PROCEDURE — 84443 ASSAY THYROID STIM HORMONE: CPT | Performed by: INTERNAL MEDICINE

## 2020-08-14 PROCEDURE — 80053 COMPREHEN METABOLIC PANEL: CPT | Performed by: INTERNAL MEDICINE

## 2020-08-14 PROCEDURE — 80061 LIPID PANEL: CPT | Performed by: INTERNAL MEDICINE

## 2020-08-14 PROCEDURE — 99397 PER PM REEVAL EST PAT 65+ YR: CPT | Performed by: INTERNAL MEDICINE

## 2020-08-14 PROCEDURE — 36415 COLL VENOUS BLD VENIPUNCTURE: CPT | Performed by: INTERNAL MEDICINE

## 2020-08-14 PROCEDURE — 85027 COMPLETE CBC AUTOMATED: CPT | Performed by: INTERNAL MEDICINE

## 2020-08-14 PROCEDURE — 99213 OFFICE O/P EST LOW 20 MIN: CPT | Mod: 25 | Performed by: INTERNAL MEDICINE

## 2020-08-14 RX ORDER — CITALOPRAM HYDROBROMIDE 20 MG/1
20 TABLET ORAL DAILY
Qty: 90 TABLET | Refills: 3 | Status: SHIPPED | OUTPATIENT
Start: 2020-08-14 | End: 2021-09-01

## 2020-08-14 RX ORDER — PRAVASTATIN SODIUM 10 MG
10 TABLET ORAL DAILY
Qty: 90 TABLET | Refills: 3 | Status: SHIPPED | OUTPATIENT
Start: 2020-08-14 | End: 2021-09-01

## 2020-08-14 RX ORDER — OMEPRAZOLE 40 MG/1
CAPSULE, DELAYED RELEASE ORAL
Qty: 90 CAPSULE | Refills: 3 | Status: SHIPPED | OUTPATIENT
Start: 2020-08-14 | End: 2021-09-01

## 2020-08-14 RX ORDER — TRAZODONE HYDROCHLORIDE 50 MG/1
50 TABLET, FILM COATED ORAL AT BEDTIME
Qty: 90 TABLET | Refills: 3 | Status: SHIPPED | OUTPATIENT
Start: 2020-08-14 | End: 2021-08-25

## 2020-08-14 RX ORDER — METOPROLOL TARTRATE 50 MG
TABLET ORAL
Qty: 135 TABLET | Refills: 3 | Status: SHIPPED | OUTPATIENT
Start: 2020-08-14 | End: 2021-09-01

## 2020-08-14 RX ORDER — ALENDRONATE SODIUM 70 MG/1
TABLET ORAL
Qty: 12 TABLET | Refills: 3 | Status: SHIPPED | OUTPATIENT
Start: 2020-08-14 | End: 2021-08-25

## 2020-08-14 SDOH — HEALTH STABILITY: PHYSICAL HEALTH: ON AVERAGE, HOW MANY MINUTES DO YOU ENGAGE IN EXERCISE AT THIS LEVEL?: 90 MIN

## 2020-08-14 SDOH — HEALTH STABILITY: PHYSICAL HEALTH: ON AVERAGE, HOW MANY DAYS PER WEEK DO YOU ENGAGE IN MODERATE TO STRENUOUS EXERCISE (LIKE A BRISK WALK)?: 6 DAYS

## 2020-08-14 ASSESSMENT — ENCOUNTER SYMPTOMS
WEAKNESS: 0
FEVER: 0
PALPITATIONS: 0
SORE THROAT: 0
DIZZINESS: 0
EYE PAIN: 0
CHILLS: 0
HEARTBURN: 0
DIARRHEA: 0
HEADACHES: 0
PARESTHESIAS: 0
FREQUENCY: 0
NAUSEA: 0
BREAST MASS: 0
HEMATOCHEZIA: 0
ABDOMINAL PAIN: 0
MYALGIAS: 0
ARTHRALGIAS: 0
NERVOUS/ANXIOUS: 0
COUGH: 0
JOINT SWELLING: 0
HEMATURIA: 0
SHORTNESS OF BREATH: 0
CONSTIPATION: 0
DYSURIA: 0

## 2020-08-14 ASSESSMENT — ACTIVITIES OF DAILY LIVING (ADL): CURRENT_FUNCTION: NO ASSISTANCE NEEDED

## 2020-08-14 ASSESSMENT — MIFFLIN-ST. JEOR: SCORE: 1162.67

## 2020-08-14 NOTE — PROGRESS NOTES
"SUBJECTIVE:   Emma Jenkins is a 74 year old female who presents for Preventive Visit.    Are you in the first 12 months of your Medicare coverage?  No    Healthy Habits:     In general, how would you rate your overall health?  Excellent    Frequency of exercise:  4-5 days/week    Duration of exercise:  Greater than 60 minutes    Do you usually eat at least 4 servings of fruit and vegetables a day, include whole grains    & fiber and avoid regularly eating high fat or \"junk\" foods?  No    Taking medications regularly:  Yes    Medication side effects:  None    Ability to successfully perform activities of daily living:  No assistance needed    Home Safety:  No safety concerns identified    Hearing Impairment:  Difficulty following a conversation in a noisy restaurant or crowded room, feel that people are mumbling or not speaking clearly, need to ask people to speak up or repeat themselves and difficulty understanding soft or whispered speech    In the past 6 months, have you been bothered by leaking of urine?  No    In general, how would you rate your overall mental or emotional health?  Good      PHQ-2 Total Score: 0    Additional concerns today:  No    Do you feel safe in your environment? Yes    Have you ever done Advance Care Planning? (For example, a Health Directive, POLST, or a discussion with a medical provider or your loved ones about your wishes): No, advance care planning information given to patient to review.  Advanced care planning was discussed at today's visit.    Some hearing issues    Fall risk  Fallen 2 or more times in the past year?: No  Any fall with injury in the past year?: No    Cognitive Screening   1) Repeat 3 items (Leader, Season, Table)    2) Clock draw: NORMAL  3) 3 item recall: Recalls 3 objects  Results: 3 items recalled: COGNITIVE IMPAIRMENT LESS LIKELY    Mini-CogTM Copyright [employer]. Licensed by the author for use in [facility]; reprinted with permission " (dyllan@[email or web address]). All rights reserved.      Do you have sleep apnea, excessive snoring or daytime drowsiness?: no    Reviewed and updated as needed this visit by clinical staff  Tobacco  Allergies  Meds         Reviewed and updated as needed this visit by Provider        Social History     Tobacco Use     Smoking status: Former Smoker     Packs/day: 0.50     Years: 10.00     Pack years: 5.00     Types: Cigarettes     Start date: 1965     Last attempt to quit: 1975     Years since quittin.3     Smokeless tobacco: Never Used     Tobacco comment: Would smoke in social settings   Substance Use Topics     Alcohol use: Yes     Alcohol/week: 4.0 standard drinks     Types: 4 Glasses of wine per week     Frequency: 2-3 times a week     Drinks per session: 1 or 2     Binge frequency: Never     Comment: Approximately 4 glasses per week         Alcohol Use 2020   Prescreen: >3 drinks/day or >7 drinks/week? No   Prescreen: >3 drinks/day or >7 drinks/week? -           PROBLEMS TO ADD ON...  In addition to an Annual Wellness Exam, we addressed chronic conditions including hyperlipidemia, osteopenia, depression, insomnia, GERD. Additional concerns today include hearing and ear wax concerns, left 5th toe deformity.    The patient is tolerating her current medications without any adverse effects.     She does not feel depressed and describes satisfactory sleep on citalopram and trazodone. Low PHQ-9 score noted today.     No adverse GI effects on alendronate.   She is fasting and we agreed to update lipids and other routine labs.   Heartburn satisfactorily controlled on PPI.    She notes that her left 5th toe turns inward and below the adjacent toes. She was offered some names of orthopedic surgeons from her rheumatologist, but is also offered a Podiatry consult.     She notes chronic hearing loss and is noted to have was in her left ear.     She continues to follow with Rheumatology for rheumatoid arthritis.      Current providers sharing in care for this patient include:   Patient Care Team:  Gene Scott MD as PCP - General (Internal Medicine-Hematology & Oncology)  Krasten Malone MD as MD (Internal Medicine)  Gene Scott MD as Assigned PCP    The following health maintenance items are reviewed in Epic and correct as of today:  Health Maintenance   Topic Date Due     DEPRESSION ACTION PLAN  1945     PHQ-9  01/30/2020     FALL RISK ASSESSMENT  07/30/2020     MEDICARE ANNUAL WELLNESS VISIT  07/30/2020     INFLUENZA VACCINE (1) 09/01/2020     MAMMO SCREENING  09/10/2021     ADVANCE CARE PLANNING  07/25/2023     LIPID  07/30/2024     DTAP/TDAP/TD IMMUNIZATION (4 - Td) 10/17/2026     COLORECTAL CANCER SCREENING  11/09/2028     DEXA  Completed     PNEUMOCOCCAL IMMUNIZATION 65+ LOW/MEDIUM RISK  Completed     ZOSTER IMMUNIZATION  Completed     IPV IMMUNIZATION  Aged Out     MENINGITIS IMMUNIZATION  Aged Out     HEPATITIS B IMMUNIZATION  Aged Out       Pneumonia Vaccine: up to date   Mammogram Screening: Patient over age 75, has elected to continue with mammography screening.    Review of Systems   Constitutional: Negative for chills and fever.   HENT: Negative for congestion, ear pain, hearing loss and sore throat.    Eyes: Negative for pain and visual disturbance.   Respiratory: Negative for cough and shortness of breath.    Cardiovascular: Negative for chest pain, palpitations and peripheral edema.   Gastrointestinal: Negative for abdominal pain, constipation, diarrhea, heartburn, hematochezia and nausea.   Breasts:  Negative for tenderness, breast mass and discharge.   Genitourinary: Negative for dysuria, frequency, genital sores, hematuria, pelvic pain, urgency, vaginal bleeding and vaginal discharge.   Musculoskeletal: Negative for arthralgias, joint swelling and myalgias.   Skin: Negative for rash.   Neurological: Negative for dizziness, weakness, headaches and paresthesias.   Psychiatric/Behavioral:  "Negative for mood changes. The patient is not nervous/anxious.      REVIEW OF SYSTEMS: The following systems have been completely reviewed and are negative except as noted above:   Constitutional, HEENT, respiratory, cardiovascular, gastrointestinal, genitourinary, musculoskeletal, dermatologic, hematologic, endocrine, psychiatric, and neurologic systems.      OBJECTIVE:   Resp 16   Ht 1.651 m (5' 5\")   Wt 66.7 kg (147 lb)   SpO2 98%   BMI 24.46 kg/m   Estimated body mass index is 24.46 kg/m  as calculated from the following:    Height as of this encounter: 1.651 m (5' 5\").    Weight as of this encounter: 66.7 kg (147 lb).  Physical Exam  GENERAL: healthy, alert and no distress  EYES: Eyes grossly normal to inspection, PERRL and conjunctivae and sclerae normal  HENT: Wax in left ear canal, unable to see TM and unable to remove wax with curette.   Right ear canal and TM normal, nose and mouth without ulcers or lesions  NECK: no adenopathy, no asymmetry, masses, or scars and thyroid normal to palpation  RESP: lungs clear to auscultation - no rales, rhonchi or wheezes  BREAST/PELVIC: not performed.  CV: regular rate and rhythm, normal S1 S2, no S3 or S4, no murmur, click or rub, no peripheral edema and peripheral pulses strong  ABDOMEN: soft, nontender, no hepatosplenomegaly, no masses and bowel sounds normal  MS: Left 5th toe turns inward and below adjacent toes. No other gross musculoskeletal defects noted, no edema  SKIN: no suspicious lesions or rashes  NEURO: Normal strength and tone, mentation intact and speech normal  PSYCH: mentation appears normal, affect normal/bright    Diagnostic Test Results:  Labs reviewed in Epic    ASSESSMENT / PLAN:   (Z00.00) Encounter for Medicare annual wellness exam  (primary encounter diagnosis)  Comment: Stable health. See epic orders.     (E78.5) Hyperlipidemia LDL goal <130  Comment: Update lipids, Continue current meds.   Plan: Comprehensive metabolic panel, Lipid panel    "      reflex to direct LDL Fasting, pravastatin         (PRAVACHOL) 10 MG tablet, OFFICE/OUTPT         VISIT,EST,LEVL III          (R03.0) Elevated blood pressure reading without diagnosis of hypertension  Comment: BP satisfactorily controlled   Plan: OFFICE/OUTPT VISIT,EST,LEVL III          (M85.80) Osteopenia, unspecified location  Comment: Tolerating alendronate, continue.   Plan: alendronate (FOSAMAX) 70 MG tablet,         OFFICE/OUTPT VISIT,EST,LEVL III          (F33.42) Major depressive disorder, recurrent episode, in full remission (H)  Comment: Well controlled. Continue current meds.   Plan: citalopram (CELEXA) 20 MG tablet, OFFICE/OUTPT         VISIT,EST,LEVL III          (K21.9) Gastroesophageal reflux disease without esophagitis  Comment: Symptoms controlled. Continue current meds.   Plan: omeprazole (PRILOSEC) 40 MG DR capsule,         OFFICE/OUTPT VISIT,EST,LEVL III          (G47.00) Insomnia, unspecified type  Comment: Refilled Trazodone..   Plan: traZODone (DESYREL) 50 MG tablet, OFFICE/OUTPT         VISIT,EST,LEVL III          (M20.62) Toe deformity, acquired, left  Comment: Notes a chronic deformity of left 5th toe turning inward and beneath adjacent toes. Offered podiatry consult.   Plan: Orthopedic & Spine  Referral          (H91.93) Bilateral hearing loss, unspecified hearing loss type  Comment: Offered ENT consult.   Plan: OTOLARYNGOLOGY REFERRAL         (H61.22) Excessive ear wax, left  Comment: Unable to remove with curette, offered irrigation by MA.     (M05.79) Seropositive rheumatoid arthritis of multiple sites (H)  Comment: Continue to follow with Rheumatology.     (R00.0) Tachycardia  Comment: Refill metoprolol.   Plan: metoprolol tartrate (LOPRESSOR) 50 MG tablet          (Z12.31) Visit for screening mammogram  Comment: ordered.   Plan: MA Screen Bilateral w/Adonis          (Z79.869) Medication management  Plan: TSH with free T4 reflex, CBC with platelets         "    COUNSELING:  Reviewed preventive health counseling, as reflected in patient instructions    Estimated body mass index is 24.46 kg/m  as calculated from the following:    Height as of this encounter: 1.651 m (5' 5\").    Weight as of this encounter: 66.7 kg (147 lb).         reports that she quit smoking about 45 years ago. Her smoking use included cigarettes. She started smoking about 55 years ago. She has a 5.00 pack-year smoking history. She has never used smokeless tobacco.      Appropriate preventive services were discussed with this patient, including applicable screening as appropriate for cardiovascular disease, diabetes, osteopenia/osteoporosis, and glaucoma.  As appropriate for age/gender, discussed screening for colorectal cancer, prostate cancer, breast cancer, and cervical cancer. Checklist reviewing preventive services available has been given to the patient.    Reviewed patients plan of care and provided an AVS. The Basic Care Plan (routine screening as documented in Health Maintenance) for Emma meets the Care Plan requirement. This Care Plan has been established and reviewed with the Patient.    Counseling Resources:  ATP IV Guidelines  Pooled Cohorts Equation Calculator  Breast Cancer Risk Calculator  FRAX Risk Assessment  ICSI Preventive Guidelines  Dietary Guidelines for Americans, 2010  [employer]'s MyPlate  ASA Prophylaxis  Lung CA Screening    Gene Scott MD, MD  [facility]    Identified Health Risks:  "

## 2020-08-14 NOTE — PATIENT INSTRUCTIONS
ENT and Podiatry contact information provided.     Will try to irrigate the left ear today.     Refills of medications have been faxed to your pharmacy.     Labs in [address].   I'll get back to you with lab results soon, especially if there is anything of concern.      Mammogram ordered.     See me in a year, sooner if problems.

## 2020-08-15 LAB
ALBUMIN SERPL-MCNC: 3.7 G/DL (ref 3.4–5)
ALP SERPL-CCNC: 79 U/L (ref 40–150)
ALT SERPL W P-5'-P-CCNC: 28 U/L (ref 0–50)
ANION GAP SERPL CALCULATED.3IONS-SCNC: 7 MMOL/L (ref 3–14)
AST SERPL W P-5'-P-CCNC: 29 U/L (ref 0–45)
BILIRUB SERPL-MCNC: 0.6 MG/DL (ref 0.2–1.3)
BUN SERPL-MCNC: 15 MG/DL (ref 7–30)
CALCIUM SERPL-MCNC: 9.5 MG/DL (ref 8.5–10.1)
CHLORIDE SERPL-SCNC: 102 MMOL/L (ref 94–109)
CHOLEST SERPL-MCNC: 217 MG/DL
CO2 SERPL-SCNC: 24 MMOL/L (ref 20–32)
CREAT SERPL-MCNC: 0.69 MG/DL (ref 0.52–1.04)
GFR SERPL CREATININE-BSD FRML MDRD: 85 ML/MIN/{1.73_M2}
GLUCOSE SERPL-MCNC: 86 MG/DL (ref 70–99)
HDLC SERPL-MCNC: 48 MG/DL
LDLC SERPL CALC-MCNC: 147 MG/DL
NONHDLC SERPL-MCNC: 169 MG/DL
POTASSIUM SERPL-SCNC: 4.5 MMOL/L (ref 3.4–5.3)
PROT SERPL-MCNC: 7.3 G/DL (ref 6.8–8.8)
SODIUM SERPL-SCNC: 133 MMOL/L (ref 133–144)
TRIGL SERPL-MCNC: 108 MG/DL
TSH SERPL DL<=0.005 MIU/L-ACNC: 2.17 MU/L (ref 0.4–4)

## 2020-08-17 ENCOUNTER — TRANSFERRED RECORDS (OUTPATIENT)
Dept: HEALTH INFORMATION MANAGEMENT | Facility: CLINIC | Age: 75
End: 2020-08-17

## 2020-09-29 ENCOUNTER — HOSPITAL ENCOUNTER (OUTPATIENT)
Dept: MAMMOGRAPHY | Facility: CLINIC | Age: 75
Discharge: HOME | End: 2020-09-29
Attending: INTERNAL MEDICINE | Admitting: INTERNAL MEDICINE
Payer: COMMERCIAL

## 2020-09-29 DIAGNOSIS — Z12.31 VISIT FOR SCREENING MAMMOGRAM: ICD-10-CM

## 2020-09-29 PROCEDURE — 77063 BREAST TOMOSYNTHESIS BI: CPT

## 2020-11-07 ENCOUNTER — HEALTH MAINTENANCE LETTER (OUTPATIENT)
Age: 75
End: 2020-11-07

## 2020-11-27 ENCOUNTER — OFFICE VISIT (OUTPATIENT)
Dept: INTERNAL MEDICINE | Facility: CLINIC | Age: 75
End: 2020-11-27
Payer: COMMERCIAL

## 2020-11-27 VITALS
SYSTOLIC BLOOD PRESSURE: 124 MMHG | WEIGHT: 149.6 LBS | HEART RATE: 78 BPM | RESPIRATION RATE: 18 BRPM | BODY MASS INDEX: 24.93 KG/M2 | DIASTOLIC BLOOD PRESSURE: 78 MMHG | OXYGEN SATURATION: 94 % | TEMPERATURE: 98.3 F | HEIGHT: 65 IN

## 2020-11-27 DIAGNOSIS — M35.00 SICCA SYNDROME (H): Primary | ICD-10-CM

## 2020-11-27 DIAGNOSIS — M79.672 LEFT FOOT PAIN: ICD-10-CM

## 2020-11-27 DIAGNOSIS — L85.3 DRY SKIN: ICD-10-CM

## 2020-11-27 PROCEDURE — 99213 OFFICE O/P EST LOW 20 MIN: CPT | Performed by: INTERNAL MEDICINE

## 2020-11-27 ASSESSMENT — MIFFLIN-ST. JEOR: SCORE: 1169.46

## 2020-11-27 NOTE — NURSING NOTE
"/78 (BP Location: Left arm, Patient Position: Sitting)   Pulse 78   Temp 98.3  F (36.8  C) (Oral)   Resp 18   Ht 1.651 m (5' 5\")   Wt 67.9 kg (149 lb 9.6 oz)   SpO2 94%   BMI 24.89 kg/m      "

## 2020-11-27 NOTE — PROGRESS NOTES
Subjective     Emma Jenkins is a 75 year old female who presents to clinic today for the following health issues:    HPI      1.  Dry mouth: This came on fairly suddenly about a month ago, woke up with very very dry mouth 1 morning, the inside of her mouth was irritated, it felt hard to open her mouth completely.  It has improved slightly, she did see the dentist shortly after that and they just told her to use Biotene and did not see any other abnormalities.  She is not sure if the biotin is made any difference or not.  She has not had any recent medication changes.  She is aware that her eyes seem dry, slightly irritated, occasionally slightly cloudy.  She is blinking a lot more.    2.  Dry skin of the face: This is been a few weeks also, using a lot of lotion but it still seems dry and wrinkled to her.  She is using a lotion her dermatologist had recommended.  There is been no rash or scaling.    3.  Associated with the above things has been some mild aching of her lower jaw or just under the chin intermittently.  This is present most days though.  She is not having any pain in her jaw joints.     4.  Left foot pain: This is been going on a long time and her rheumatologist had suggested a foot surgeon but was a little reluctant to have her go ahead with surgery, she had not seen the foot specialist about it yet.    Patient Active Problem List   Diagnosis     Seropositive rheumatoid arthritis of multiple sites (H)     Major depressive disorder, recurrent episode, in full remission (H)     Cervicalgia     Anxiety     Gastroenteritis     Sicca syndrome (H)     Current Outpatient Medications   Medication Sig Dispense Refill     acyclovir (ZOVIRAX) 400 MG tablet Take 1 tablet (400 mg) by mouth 3 times daily 15 tablet 3     alendronate (FOSAMAX) 70 MG tablet Take 1 tablet (70 mg) by mouth with 8oz water every 7 days 30 minutes before breakfast and remain upright during this time. 12 tablet 3     aspirin 81 MG  tablet Take 1 tablet (81 mg) by mouth daily 30 tablet 0     citalopram (CELEXA) 20 MG tablet Take 1 tablet (20 mg) by mouth daily 90 tablet 3     etanercept (ENBREL) 50 MG/ML injection Inject 50 mg Subcutaneous once a week On        leflunomide (ARAVA) 10 MG tablet Take 20 mg by mouth every other day        meclizine (ANTIVERT) 25 MG tablet Take 1 tablet (25 mg) by mouth 3 times daily as needed for dizziness 30 tablet 3     metoprolol tartrate (LOPRESSOR) 25 MG tablet Take 25 mg by mouth every morning       metoprolol tartrate (LOPRESSOR) 50 MG tablet TAKE ONE-HALF TABLET BY MOUTH EVERY MORNING AND TAKE ONE TABLET BY MOUTH EVERY NIGHT AT BEDTIME 135 tablet 3     omeprazole (PRILOSEC) 40 MG DR capsule TAKE 1 CAPSULE BY MOUTH EVERY DAY, TAKE 30-60 MINUTES BEFORE EATING 90 capsule 3     polyethylene glycol (MIRALAX/GLYCOLAX) Packet Take 1 packet by mouth daily       pravastatin (PRAVACHOL) 10 MG tablet Take 1 tablet (10 mg) by mouth daily 90 tablet 3     traZODone (DESYREL) 50 MG tablet Take 1 tablet (50 mg) by mouth At Bedtime 90 tablet 3     calcium carb 1250 mg, 500 mg [place],/vitamin D 200 units (OSCAL WITH D) 500-200 MG-UNIT per tablet Take 1 tablet by mouth every morning        Cholecalciferol (VITAMIN D3 PO) Take 2,500 Units by mouth daily        GARLIC PO Take 1 capsule by mouth daily        multivitamin, therapeutic (THERA-VIT) TABS Take 1 tablet by mouth daily       Omega-3 Fatty Acids (OMEGA-3 FISH OIL PO) Take 1 g by mouth daily         Social History     Tobacco Use     Smoking status: Former Smoker     Packs/day: 0.50     Years: 10.00     Pack years: 5.00     Types: Cigarettes     Start date: 1965     Quit date: 1975     Years since quittin.6     Smokeless tobacco: Never Used     Tobacco comment: Would smoke in social settings   Substance Use Topics     Alcohol use: Yes     Alcohol/week: 4.0 standard drinks     Types: 4 Glasses of wine per week     Frequency: 2-3 times a week      "Drinks per session: 1 or 2     Binge frequency: Never     Comment: Approximately 4 glasses per week     Drug use: No          Review of Systems   No fever, chills, cough, tooth pain, jaw joint pain      Objective    /78 (BP Location: Left arm, Patient Position: Sitting)   Pulse 78   Temp 98.3  F (36.8  C) (Oral)   Resp 18   Ht 1.651 m (5' 5\")   Wt 67.9 kg (149 lb 9.6 oz)   SpO2 94%   BMI 24.89 kg/m    Body mass index is 24.89 kg/m .  Physical Exam     Her eyes are with minimal conjunctival injection, no significant swelling or erythema  There is some very slight tenderness of the submandibular salivary glands, no lymph nodes, no tenderness of the mandible, no tenderness in TMJs, masseter muscles          Assessment & Plan     Sicca syndrome (H)  She has dry eyes and dry mouth, it is possible this could be a rheumatologic condition, has known rheumatoid arthritis.  It could also be a medication effect.  She does have an appoint with rheumatology in about 10 days and so advised her that is very important she discussed this with them, in the meantime can continue the biotin if she thinks it helps, suck on sugar-free candy to help improve saliva production.  Also recommend use artificial tears, consider follow-up with the eye doctor as they may prescribe some eyedrops.  The jaw soreness seems to possibly be related to some tenderness of the salivary glands, no dental issues, TMJ issues    Dry skin  Encourage her to continue using the lotion per the dermatologist, make sure she is getting plenty of fluids and follow-up with dermatology as needed    Left foot pain  She declines a referral to podiatry at this time, will probably see the foot specialist recommended by her rheumatologist              Return in about 9 months (around 8/25/2021) for Wellness visit with Dr. Scott.    Lizz العراقي MD  [facility]    "

## 2020-12-08 ENCOUNTER — TRANSFERRED RECORDS (OUTPATIENT)
Dept: HEALTH INFORMATION MANAGEMENT | Facility: CLINIC | Age: 75
End: 2020-12-08

## 2020-12-08 LAB
ALT SERPL-CCNC: 18 IU/L (ref 5–35)
AST SERPL-CCNC: 28 U/L (ref 5–34)
CREAT SERPL-MCNC: 0.63 MG/DL (ref 0.5–1.3)

## 2020-12-21 ENCOUNTER — TRANSFERRED RECORDS (OUTPATIENT)
Dept: HEALTH INFORMATION MANAGEMENT | Facility: CLINIC | Age: 75
End: 2020-12-21

## 2021-01-04 ENCOUNTER — MYC MEDICAL ADVICE (OUTPATIENT)
Dept: INTERNAL MEDICINE | Facility: CLINIC | Age: 76
End: 2021-01-04

## 2021-01-07 ENCOUNTER — MYC MEDICAL ADVICE (OUTPATIENT)
Dept: INTERNAL MEDICINE | Facility: CLINIC | Age: 76
End: 2021-01-07

## 2021-01-18 ENCOUNTER — MYC MEDICAL ADVICE (OUTPATIENT)
Dept: INTERNAL MEDICINE | Facility: CLINIC | Age: 76
End: 2021-01-18

## 2021-01-29 ENCOUNTER — MYC MEDICAL ADVICE (OUTPATIENT)
Dept: INTERNAL MEDICINE | Facility: CLINIC | Age: 76
End: 2021-01-29

## 2021-02-05 ENCOUNTER — MYC MEDICAL ADVICE (OUTPATIENT)
Dept: INTERNAL MEDICINE | Facility: CLINIC | Age: 76
End: 2021-02-05

## 2021-03-09 ENCOUNTER — TRANSFERRED RECORDS (OUTPATIENT)
Dept: HEALTH INFORMATION MANAGEMENT | Facility: CLINIC | Age: 76
End: 2021-03-09

## 2021-03-09 LAB
ALT SERPL-CCNC: 21 IU/L (ref 5–35)
AST SERPL-CCNC: 34 U/L (ref 5–34)
CREAT SERPL-MCNC: 0.72 MG/DL (ref 0.5–1.3)

## 2021-06-07 ENCOUNTER — TRANSFERRED RECORDS (OUTPATIENT)
Dept: HEALTH INFORMATION MANAGEMENT | Facility: CLINIC | Age: 76
End: 2021-06-07

## 2021-06-07 LAB
ALT SERPL-CCNC: 22 IU/L (ref 5–35)
AST SERPL-CCNC: 31 U/L (ref 5–34)
CREAT SERPL-MCNC: 0.59 MG/DL (ref 0.5–1.3)
GFR SERPL CREATININE-BSD FRML MDRD: 105.3 ML/MIN/1.73M2

## 2021-06-25 ENCOUNTER — OFFICE VISIT (OUTPATIENT)
Dept: URGENT CARE | Facility: URGENT CARE | Age: 76
End: 2021-06-25
Payer: COMMERCIAL

## 2021-06-25 VITALS
DIASTOLIC BLOOD PRESSURE: 80 MMHG | OXYGEN SATURATION: 98 % | SYSTOLIC BLOOD PRESSURE: 128 MMHG | RESPIRATION RATE: 16 BRPM | TEMPERATURE: 96.2 F | HEART RATE: 63 BPM

## 2021-06-25 DIAGNOSIS — N89.8 VAGINAL ITCHING: Primary | ICD-10-CM

## 2021-06-25 DIAGNOSIS — N95.2 VAGINAL ATROPHY: ICD-10-CM

## 2021-06-25 DIAGNOSIS — N89.8 VAGINAL DRYNESS: ICD-10-CM

## 2021-06-25 LAB
SPECIMEN SOURCE: NORMAL
WET PREP SPEC: NORMAL

## 2021-06-25 PROCEDURE — 99214 OFFICE O/P EST MOD 30 MIN: CPT | Performed by: PHYSICIAN ASSISTANT

## 2021-06-25 PROCEDURE — 87210 SMEAR WET MOUNT SALINE/INK: CPT | Performed by: PHYSICIAN ASSISTANT

## 2021-06-25 RX ORDER — FLUCONAZOLE 150 MG/1
150 TABLET ORAL ONCE
Qty: 1 TABLET | Refills: 0 | Status: SHIPPED | OUTPATIENT
Start: 2021-06-25 | End: 2021-06-25

## 2021-06-25 RX ORDER — GLYCERIN/MIN OIL/POLYCARBOPHIL
1 GEL WITH APPLICATOR (GRAM) VAGINAL 2 TIMES DAILY PRN
Qty: 35 G | Refills: 0 | Status: SHIPPED | OUTPATIENT
Start: 2021-06-25 | End: 2021-09-01

## 2021-06-25 NOTE — PATIENT INSTRUCTIONS
Patient Education     Atrophic Vaginitis    Atrophic vaginitis means the walls of your vagina have become thin. This happens when your body makes too little of the hormone estrogen. Menopause or surgical removal of the ovaries are the most common causes for a drop in estrogen. Breastfeeding can also cause the hormone level to drop.   Symptoms of atrophic vaginitis include:    Dryness, soreness, burning, or itching in the vagina    Vaginal discharge  Sex can be uncomfortable, even painful. After sex, you may have bleeding from your vagina. You may also have burning or pain when you urinate.   Home care  Your healthcare provider may recommend one or more of these as treatment:     Vaginal creams, lotions, and lubricants. These products help relieve vaginal dryness. They don t need a prescription. They can be found in the personal care section of most pharmacies. Creams and lotions are used daily to help keep the vagina moist. Lubricants help reduce dryness and pain during sex. Choose water-based lubricants. Don t use petroleum jelly, mineral oil, or other oils. These increase the chance of infection.    Hormone therapy (HT). HT increases the amount of estrogen in your body. This can help manage or relieve symptoms. HT can be given in pill form. It may be given as a lotion, cream, ring put into the vagina, or a patch on the skin. The risks and benefits of HT vary for each woman. For instance, your risk may be higher if you have had breast cancer. Discuss this treatment with your healthcare provider. Not every woman can use HT.  You don t need to stop having sex. In fact, regular sex can help keep vaginal tissues healthy. Take steps to make sex more comfortable by using water-based lubricants.   Preventing infections  Atrophic vaginitis makes an infection of the vagina or the urinary tract more likely. To help reduce your risk:     Keep your genitals clean. When you bathe, wash the outside of your vagina with mild  soap and water. Clean gently between the folds of your vagina.    Wipe from front to back after a bowel movement.    Don t douche unless your healthcare provider tells you to.    Don't use scented toilet paper, scented vaginal sprays, or scented tampons.    Don't wear clothes that are tight in the crotch. These include pantyhose, jeans, and leggings. Wear cotton underwear. Change it every day.  Follow-up care  Follow up with your healthcare provider, or as advised.  When to seek medical advice  Call your health care provider right away if any of these occur:    Fever of 100.4 F (38 C) or higher, or as directed by your healthcare provider    Symptoms don t go away or get worse even with treatment    Vaginal area swells or becomes painful    Vaginal area bleeds, but not because of your period    Bad-smelling discharge from the vagina    Pain or burning when you urinate or you have trouble passing urine    Open sores develop around vagina   Suha last reviewed this educational content on 10/1/2019    5038-0143 [employer]. All rights reserved. This information is not intended as a substitute for professional medical care. Always follow your healthcare professional's instructions.

## 2021-06-26 NOTE — PROGRESS NOTES
Assessment & Plan     Vaginal itching  Wet prep negative for infection  Diflucan trial course for possible yeast  - Wet prep  - fluconazole (DIFLUCAN) 150 MG tablet; Take 1 tablet (150 mg) by mouth once for 1 dose    Vaginal atrophy  Repens for vaginal atrophy and dryness  - Vaginal Lubricant (REPLENS) GEL; Place 1 Dose vaginally 2 times daily as needed (vaginal dryness)    Vaginal dryness  replens for vaginal atrophy and dryness  - Vaginal Lubricant (REPLENS) GEL; Place 1 Dose vaginally 2 times daily as needed (vaginal dryness)      Deangelo Hobson PA-C  [facility] BOYD Carter is a 75 year old who presents for the following health issues     HPI     Vaginal dryness  Moderate vaginal itching    Review of Systems   Constitutional, HEENT, cardiovascular, pulmonary, gi and gu systems are negative, except as otherwise noted.      Objective    /80   Pulse 63   Temp 96.2  F (35.7  C) (Tympanic)   Resp 16   SpO2 98%   There is no height or weight on file to calculate BMI.  Physical Exam   GENERAL: healthy, alert and no distress  CV: regular rate and rhythm, normal S1 S2, no S3 or S4, no murmur, click or rub, no peripheral edema and peripheral pulses strong  ABDOMEN: soft, nontender, no hepatosplenomegaly, no masses and bowel sounds normal   (female): Positive for vaginal dryness, atrophy and thinning of labial skin  MS: no gross musculoskeletal defects noted, no edema  SKIN: Area is whitened color due to atrophy in the introitus   NEURO: Normal strength and tone, mentation intact and speech normal  PSYCH: mentation appears normal, affect normal/bright    Results for orders placed or performed in visit on 06/25/21   Wet prep     Status: None    Specimen: Vagina   Result Value Ref Range    Specimen Description Vagina     Wet Prep No Trichomonas seen     Wet Prep No clue cells seen     Wet Prep No yeast seen     Wet Prep WBC'S seen  Few

## 2021-08-10 ENCOUNTER — TRANSFERRED RECORDS (OUTPATIENT)
Dept: HEALTH INFORMATION MANAGEMENT | Facility: CLINIC | Age: 76
End: 2021-08-10

## 2021-09-01 ENCOUNTER — OFFICE VISIT (OUTPATIENT)
Dept: INTERNAL MEDICINE | Facility: CLINIC | Age: 76
End: 2021-09-01
Payer: COMMERCIAL

## 2021-09-01 VITALS
HEIGHT: 65 IN | RESPIRATION RATE: 15 BRPM | HEART RATE: 91 BPM | DIASTOLIC BLOOD PRESSURE: 72 MMHG | WEIGHT: 146.5 LBS | OXYGEN SATURATION: 94 % | BODY MASS INDEX: 24.41 KG/M2 | SYSTOLIC BLOOD PRESSURE: 128 MMHG | TEMPERATURE: 96.5 F

## 2021-09-01 DIAGNOSIS — H91.93 BILATERAL HEARING LOSS, UNSPECIFIED HEARING LOSS TYPE: ICD-10-CM

## 2021-09-01 DIAGNOSIS — G47.00 INSOMNIA, UNSPECIFIED TYPE: ICD-10-CM

## 2021-09-01 DIAGNOSIS — R00.0 TACHYCARDIA: ICD-10-CM

## 2021-09-01 DIAGNOSIS — M54.2 CERVICALGIA: ICD-10-CM

## 2021-09-01 DIAGNOSIS — Z00.00 ENCOUNTER FOR MEDICARE ANNUAL WELLNESS EXAM: Primary | ICD-10-CM

## 2021-09-01 DIAGNOSIS — K21.9 GASTROESOPHAGEAL REFLUX DISEASE WITHOUT ESOPHAGITIS: ICD-10-CM

## 2021-09-01 DIAGNOSIS — E78.5 HYPERLIPIDEMIA LDL GOAL <130: ICD-10-CM

## 2021-09-01 DIAGNOSIS — F33.42 MAJOR DEPRESSIVE DISORDER, RECURRENT EPISODE, IN FULL REMISSION (H): ICD-10-CM

## 2021-09-01 DIAGNOSIS — M54.50 MIDLINE LOW BACK PAIN WITHOUT SCIATICA, UNSPECIFIED CHRONICITY: ICD-10-CM

## 2021-09-01 DIAGNOSIS — Z12.31 VISIT FOR SCREENING MAMMOGRAM: ICD-10-CM

## 2021-09-01 DIAGNOSIS — Z79.899 MEDICATION MANAGEMENT: ICD-10-CM

## 2021-09-01 DIAGNOSIS — M35.00 SICCA SYNDROME (H): ICD-10-CM

## 2021-09-01 DIAGNOSIS — M05.79 SEROPOSITIVE RHEUMATOID ARTHRITIS OF MULTIPLE SITES (H): ICD-10-CM

## 2021-09-01 DIAGNOSIS — M85.80 OSTEOPENIA, UNSPECIFIED LOCATION: ICD-10-CM

## 2021-09-01 PROCEDURE — 99213 OFFICE O/P EST LOW 20 MIN: CPT | Mod: 25 | Performed by: INTERNAL MEDICINE

## 2021-09-01 PROCEDURE — 99397 PER PM REEVAL EST PAT 65+ YR: CPT | Performed by: INTERNAL MEDICINE

## 2021-09-01 RX ORDER — PRAVASTATIN SODIUM 10 MG
10 TABLET ORAL DAILY
Qty: 90 TABLET | Refills: 3 | Status: SHIPPED | OUTPATIENT
Start: 2021-09-01 | End: 2022-10-07

## 2021-09-01 RX ORDER — CITALOPRAM HYDROBROMIDE 20 MG/1
20 TABLET ORAL DAILY
Qty: 90 TABLET | Refills: 3 | Status: SHIPPED | OUTPATIENT
Start: 2021-09-01 | End: 2022-10-07

## 2021-09-01 RX ORDER — PREDNISONE 20 MG/1
40 TABLET ORAL DAILY
Qty: 10 TABLET | Refills: 3 | Status: SHIPPED | OUTPATIENT
Start: 2021-09-01 | End: 2021-09-06

## 2021-09-01 RX ORDER — METOPROLOL TARTRATE 50 MG
TABLET ORAL
Qty: 135 TABLET | Refills: 3 | Status: SHIPPED | OUTPATIENT
Start: 2021-09-01 | End: 2022-10-07

## 2021-09-01 RX ORDER — OMEPRAZOLE 40 MG/1
CAPSULE, DELAYED RELEASE ORAL
Qty: 90 CAPSULE | Refills: 3 | Status: SHIPPED | OUTPATIENT
Start: 2021-09-01 | End: 2022-10-07

## 2021-09-01 SDOH — ECONOMIC STABILITY: FOOD INSECURITY: WITHIN THE PAST 12 MONTHS, YOU WORRIED THAT YOUR FOOD WOULD RUN OUT BEFORE YOU GOT MONEY TO BUY MORE.: NEVER TRUE

## 2021-09-01 SDOH — HEALTH STABILITY: PHYSICAL HEALTH: ON AVERAGE, HOW MANY MINUTES DO YOU ENGAGE IN EXERCISE AT THIS LEVEL?: 90 MIN

## 2021-09-01 SDOH — HEALTH STABILITY: PHYSICAL HEALTH: ON AVERAGE, HOW MANY DAYS PER WEEK DO YOU ENGAGE IN MODERATE TO STRENUOUS EXERCISE (LIKE A BRISK WALK)?: 6 DAYS

## 2021-09-01 SDOH — ECONOMIC STABILITY: INCOME INSECURITY: IN THE LAST 12 MONTHS, WAS THERE A TIME WHEN YOU WERE NOT ABLE TO PAY THE MORTGAGE OR RENT ON TIME?: NO

## 2021-09-01 SDOH — ECONOMIC STABILITY: FOOD INSECURITY: WITHIN THE PAST 12 MONTHS, THE FOOD YOU BOUGHT JUST DIDN'T LAST AND YOU DIDN'T HAVE MONEY TO GET MORE.: NEVER TRUE

## 2021-09-01 ASSESSMENT — ENCOUNTER SYMPTOMS
CHILLS: 0
PALPITATIONS: 0
ARTHRALGIAS: 0
HEADACHES: 0
FEVER: 0
NAUSEA: 0
NERVOUS/ANXIOUS: 0
PARESTHESIAS: 0
MYALGIAS: 0
DIZZINESS: 0
HEMATOCHEZIA: 0
CONSTIPATION: 1
BREAST MASS: 0
ABDOMINAL PAIN: 0
JOINT SWELLING: 0
HEMATURIA: 0
HEARTBURN: 0
EYE PAIN: 0
WEAKNESS: 0
SORE THROAT: 0
COUGH: 0
DYSURIA: 0
DIARRHEA: 0
SHORTNESS OF BREATH: 0
FREQUENCY: 0

## 2021-09-01 ASSESSMENT — LIFESTYLE VARIABLES
HOW MANY STANDARD DRINKS CONTAINING ALCOHOL DO YOU HAVE ON A TYPICAL DAY: 1 OR 2
HOW OFTEN DO YOU HAVE SIX OR MORE DRINKS ON ONE OCCASION: NEVER
HOW OFTEN DO YOU HAVE A DRINK CONTAINING ALCOHOL: 4 OR MORE TIMES A WEEK

## 2021-09-01 ASSESSMENT — SOCIAL DETERMINANTS OF HEALTH (SDOH)
WITHIN THE LAST YEAR, HAVE YOU BEEN AFRAID OF YOUR PARTNER OR EX-PARTNER?: NO
HOW HARD IS IT FOR YOU TO PAY FOR THE VERY BASICS LIKE FOOD, HOUSING, MEDICAL CARE, AND HEATING?: NOT HARD AT ALL
WITHIN THE LAST YEAR, HAVE YOU BEEN HUMILIATED OR EMOTIONALLY ABUSED IN OTHER WAYS BY YOUR PARTNER OR EX-PARTNER?: NO
WITHIN THE LAST YEAR, HAVE YOU BEEN KICKED, HIT, SLAPPED, OR OTHERWISE PHYSICALLY HURT BY YOUR PARTNER OR EX-PARTNER?: NO

## 2021-09-01 ASSESSMENT — MIFFLIN-ST. JEOR: SCORE: 1155.4

## 2021-09-01 ASSESSMENT — ACTIVITIES OF DAILY LIVING (ADL): CURRENT_FUNCTION: NO ASSISTANCE NEEDED

## 2021-09-01 ASSESSMENT — PATIENT HEALTH QUESTIONNAIRE - PHQ9: SUM OF ALL RESPONSES TO PHQ QUESTIONS 1-9: 1

## 2021-09-01 NOTE — PATIENT INSTRUCTIONS
"  Patient Education   Personalized Prevention Plan  You are due for the preventive services outlined below.  Your care team is available to assist you in scheduling these services.  If you have already completed any of these items, please share that information with your care team to update in your medical record.  Health Maintenance Due   Topic Date Due     ANNUAL REVIEW OF HM ORDERS  Never done     Depression Action Plan  Never done     Hepatitis C Screening  Never done     Depression Assessment  01/30/2020     COVID-19 Vaccine (3 - Pfizer risk 3-dose series) 03/30/2021     FALL RISK ASSESSMENT  08/14/2021     Flu Vaccine (1) 09/01/2021           Everything looks fine!    Okay to try taking a slightly lower dose of citalopram if interested--would suggest alternating a full 20 mg tablet with one-half tablet every other day. If you make any changes, recommend an appointment in two months (could be telephone, video or office visit).     Schedule a future \"lab only\" appointment to update labs.   Lab results will be available soon on [employer].    Refills of medications have been faxed to your pharmacy.     See me in two months if you make any medication changes, otherwise in a year. , sooner if problems.     "

## 2021-09-01 NOTE — NURSING NOTE
"/72   Pulse 91   Temp (!) 96.5  F (35.8  C) (Tympanic)   Resp 15   Ht 1.651 m (5' 5\")   Wt 66.5 kg (146 lb 8 oz)   SpO2 94%   BMI 24.38 kg/m    Patient in for Medicare Visit.  Susan Gonzalez CMA    "

## 2021-09-01 NOTE — PROGRESS NOTES
"SUBJECTIVE:   Emma Jenkins is a 75 year old female who presents for Preventive Visit.      Patient has been advised of split billing requirements and indicates understanding: Yes   Are you in the first 12 months of your Medicare coverage?  No    Healthy Habits:     In general, how would you rate your overall health?  Good    Frequency of exercise:  4-5 days/week    Duration of exercise:  45-60 minutes    Do you usually eat at least 4 servings of fruit and vegetables a day, include whole grains    & fiber and avoid regularly eating high fat or \"junk\" foods?  No    Taking medications regularly:  Yes    Medication side effects:  None    Ability to successfully perform activities of daily living:  No assistance needed    Home Safety:  No safety concerns identified    Hearing Impairment:  Difficulty following a conversation in a noisy restaurant or crowded room, feel that people are mumbling or not speaking clearly, need to ask people to speak up or repeat themselves and difficulty understanding soft or whispered speech    In the past 6 months, have you been bothered by leaking of urine? Yes    In general, how would you rate your overall mental or emotional health?  Good      PHQ-2 Total Score: 0    Additional concerns today:  No    Do you feel safe in your environment? Yes    Have you ever done Advance Care Planning? (For example, a Health Directive, POLST, or a discussion with a medical provider or your loved ones about your wishes): No, advance care planning information given to patient to review.  Advanced care planning was discussed at today's visit.       Fall risk  Fallen 2 or more times in the past year?: No  Any fall with injury in the past year?: No    Cognitive Screening   1) Repeat 3 items (Leader, Season, Table)    2) Clock draw: NORMAL  3) 3 item recall: Recalls 3 objects  Results: 3 items recalled: COGNITIVE IMPAIRMENT LESS LIKELY    Mini-CogTM Copyright S Sissy. Licensed by the author for use in " [facility]; reprinted with permission (dyllan@[facility]). All rights reserved.      Do you have sleep apnea, excessive snoring or daytime drowsiness?: no    Reviewed and updated as needed this visit by clinical staff  Tobacco     Med Hx  Surg Hx  Fam Hx  Soc Hx        Reviewed and updated as needed this visit by Provider                Social History     Tobacco Use     Smoking status: Former Smoker     Packs/day: 0.50     Years: 10.00     Pack years: 5.00     Types: Cigarettes     Start date: 1965     Quit date: 1975     Years since quittin.3     Smokeless tobacco: Never Used     Tobacco comment: Would smoke in social settings   Substance Use Topics     Alcohol use: Yes     Alcohol/week: 4.0 standard drinks     Types: 4 Glasses of wine per week     Comment: Approximately 4 glasses per week     If you drink alcohol do you typically have >3 drinks per day or >7 drinks per week? No    Alcohol Use 2021   Prescreen: >3 drinks/day or >7 drinks/week? No   Prescreen: >3 drinks/day or >7 drinks/week? -   No flowsheet data found.        PROBLEMS TO ADD ON...In addition to an Annual Wellness Exam, we addressed depression, tachycardia, GERD, osteopenia.    Depressive symptoms well controlled on citalopram.   She is tolerating alendronate for osteopenia without noting adverse GI effects. No dysphagia or odynophagia.  Heartburn controlled on omeprazole.   Previous symptoms of tachycardia controlled on metoprolol.           Current providers sharing in care for this patient include:   Patient Care Team:  Gene Scott MD as PCP - General (Internal Medicine-Hematology & Oncology)  Karsten Malone MD as MD (Internal Medicine)  Lizz العراقي MD as Assigned PCP    The following health maintenance items are reviewed in Epic and correct as of today:  Health Maintenance Due   Topic Date Due     ANNUAL REVIEW OF HM ORDERS  Never done     DEPRESSION ACTION PLAN  Never done     HEPATITIS C SCREENING  Never  "done     PHQ-9  01/30/2020     COVID-19 Vaccine (3 - Pfizer risk 3-dose series) 03/30/2021     FALL RISK ASSESSMENT  08/14/2021     INFLUENZA VACCINE (1) 09/01/2021     MEDICARE ANNUAL WELLNESS VISIT  08/14/2021     Pneumonia Vaccine: Patient has received both pneumonia vaccinations.     Mammogram Screening - Patient over age 75, has elected to continue with screening.  Pertinent mammograms are reviewed under the imaging tab.    Review of Systems   Constitutional: Negative for chills and fever.   HENT: Negative for congestion, ear pain, hearing loss and sore throat.    Eyes: Negative for pain and visual disturbance.   Respiratory: Negative for cough and shortness of breath.    Cardiovascular: Negative for chest pain, palpitations and peripheral edema.   Gastrointestinal: Positive for constipation. Negative for abdominal pain, diarrhea, heartburn, hematochezia and nausea.   Breasts:  Negative for tenderness, breast mass and discharge.   Genitourinary: Negative for dysuria, frequency, genital sores, hematuria, pelvic pain, urgency, vaginal bleeding and vaginal discharge.   Musculoskeletal: Negative for arthralgias, joint swelling and myalgias.   Skin: Negative for rash.   Neurological: Negative for dizziness, weakness, headaches and paresthesias.   Psychiatric/Behavioral: Negative for mood changes. The patient is not nervous/anxious.        OBJECTIVE:   Ht 1.651 m (5' 5\")   BMI 24.89 kg/m   Estimated body mass index is 24.89 kg/m  as calculated from the following:    Height as of this encounter: 1.651 m (5' 5\").    Weight as of 11/27/20: 67.9 kg (149 lb 9.6 oz).  Physical Exam  GENERAL: healthy, alert and no distress  EYES: Eyes grossly normal to inspection, PERRL and conjunctivae and sclerae normal  HENT: ear canals and TM's normal, nose and mouth without ulcers or lesions  NECK: no adenopathy, no asymmetry, masses, or scars and thyroid normal to palpation  RESP: lungs clear to auscultation - no rales, rhonchi or " wheezes  BREAST/PELVIC exams not performed  CV: regular rate and rhythm, normal S1 S2, no S3 or S4, no murmur, click or rub, no peripheral edema and peripheral pulses strong  ABDOMEN: soft, nontender, no hepatosplenomegaly, no masses and bowel sounds normal  MS: no gross musculoskeletal defects noted, no edema  SKIN: no suspicious lesions or rashes  NEURO: Normal strength and tone, mentation intact and speech normal  PSYCH: mentation appears normal, affect normal/bright    Diagnostic Test Results:  Labs reviewed in Epic    ASSESSMENT / PLAN:   (Z00.00) Encounter for Medicare annual wellness exam  (primary encounter diagnosis)  Comment: Stable health. See epic orders.     (M35.00) Sicca syndrome (H)    (F33.42) Major depressive disorder, recurrent episode, in full remission (H)  Comment: Well controlled. Continue current meds.   Plan: citalopram (CELEXA) 20 MG tablet, OFFICE/OUTPT         VISIT,EST,LEVL III          (R00.0) Tachycardia  Comment: Stable. Continue current meds.   Plan: metoprolol tartrate (LOPRESSOR) 50 MG tablet,         **TSH with free T4 reflex FUTURE 2mo,         OFFICE/OUTPT VISIT,EST,LEVL III          (K21.9) Gastroesophageal reflux disease without esophagitis  Comment: Symptoms Well controlled. Continue current meds.   Plan: omeprazole (PRILOSEC) 40 MG DR capsule,         OFFICE/OUTPT VISIT,EST,LEVL III          (E78.5) Hyperlipidemia LDL goal <130  Comment: Update lipids. Continue current meds.   Plan: pravastatin (PRAVACHOL) 10 MG tablet, Lipid         panel reflex to direct LDL Fasting,         **Comprehensive metabolic panel FUTURE 2mo,         OFFICE/OUTPT VISIT,EST,LEVL III          (M85.80) Osteopenia, unspecified location  Comment: Continue alendronate.   Plan: OFFICE/OUTPT VISIT,EST,LEVL III          (G47.00) Insomnia, unspecified type    (M05.79) Seropositive rheumatoid arthritis of multiple sites (H)  Comment: Continue chronic meds and rheumatologic f/u.     (Z79.049) Medication  "management  Plan: **CBC with platelets FUTURE 2mo    (Z12.31) Visit for screening mammogram  Plan: MA Screen Bilateral w/Adonis          (H91.93) Bilateral hearing loss, unspecified hearing loss type  Comment: Offered ENT referral.   Plan: Otolaryngology Referral          (M54.2) Cervicalgia  Comment: Offered Physical Therapy referral.   Plan: [facility] Referral          (M54.5) Midline low back pain without sciatica, unspecified chronicity  Comment: Offered refill of prednisone for future flares.  Plan: predniSONE (DELTASONE) 20 MG tablet            Patient has been advised of split billing requirements and indicates understanding: Yes  COUNSELING:  Reviewed preventive health counseling, as reflected in patient instructions    Estimated body mass index is 24.89 kg/m  as calculated from the following:    Height as of this encounter: 1.651 m (5' 5\").    Weight as of 11/27/20: 67.9 kg (149 lb 9.6 oz).        She reports that she quit smoking about 46 years ago. Her smoking use included cigarettes. She started smoking about 56 years ago. She has a 5.00 pack-year smoking history. She has never used smokeless tobacco.      Appropriate preventive services were discussed with this patient, including applicable screening as appropriate for cardiovascular disease, diabetes, osteopenia/osteoporosis, and glaucoma.  As appropriate for age/gender, discussed screening for colorectal cancer, prostate cancer, breast cancer, and cervical cancer. Checklist reviewing preventive services available has been given to the patient.    Reviewed patients plan of care and provided an AVS. The Basic Care Plan (routine screening as documented in Health Maintenance) for Emma meets the Care Plan requirement. This Care Plan has been established and reviewed with the Patient.    Counseling Resources:  ATP IV Guidelines  Pooled Cohorts Equation Calculator  Breast Cancer Risk Calculator  Breast Cancer: Medication to Reduce Risk  FRAX Risk " Assessment  ICSI Preventive Guidelines  Dietary Guidelines for Americans, 2010  USDA's MyPlate  ASA Prophylaxis  Lung CA Screening    Gene Scott MD, MD  [facility]    Identified Health Risks:

## 2021-09-05 ENCOUNTER — HEALTH MAINTENANCE LETTER (OUTPATIENT)
Age: 76
End: 2021-09-05

## 2021-09-08 NOTE — H&P
Admitted:     03/28/2019      CHIEF COMPLAINT:  Abdominal pain, diarrhea, nausea, vomiting, chills, low-grade fever.      HISTORY OF PRESENT ILLNESS:  Ms. Jenkins is a pleasant 73-year-old female with a history of rheumatoid arthritis.  She takes Enbrel.  She presented to the hospital today for the above concerns.  She lives at home with her .  She reports that approximately 3-4 days ago she began to develop pain and abdominal distention.  Two days ago she began to develop diarrhea.  This seemed to improve and resolve.  Yesterday she tried to eat something, but developed nausea and emesis.  Overnight last night her diarrhea recurred and she had loose stools all night.  She presented to the ER for the above concerns.  She has no history of Clostridium difficile colitis and has not had any recent antibiotic use.  Her  who lives with her does not have any gastrointestinal symptoms.  She reports that her only sick contact was that she was in [place] last week babysitting her grandchild who apparently was sick with upper respiratory infectious symptoms.  Grandchild did not have any diarrhea symptoms at that point.      On arrival to the ER this afternoon, vital signs included a blood pressure of 130/90 with a heart rate of 100.  Temperature 100.1 degrees, saturation 97% on room air.  Workup in the ER included labs and imaging.  CBC normal.  Lactic acid normal.  Complete metabolic panel normal.  Urinalysis unremarkable.  CT scan showed no acute findings to explain her symptoms beyond fluid-filled bowel loops which could be due to enteritis.  There was no evidence of bowel wall thickening or obstruction present.      Of note, multifocal areas of decreased attenuation in the right kidney noted with an irregular shape.  Nonspecific findings but may be due to acute pyelonephritis.  Of note, her urinalysis was unremarkable.      I spoke with the ER provider and plan is for admission to observation for  hydration.      PAST MEDICAL HISTORY:   1.  Rheumatoid arthritis.  The patient is on Enbrel and also takes Arava.   2.  Anxiety.   3.  Depression.   4.  Hyperlipidemia.   5.  Reflux disease.   6.  Hypertension.      CURRENT MEDICATIONS:   1.  Enbrel.   2.  Arava.   3.  Aspirin.   4.  Celexa.   5.  Metoprolol.   6.  Omeprazole.   7.  MiraLax.   8.  Pravastatin.   9.  Trazodone.   10.  Acyclovir as needed.   11.  Alendronate.      FAMILY HISTORY:  Reviewed.  Nothing contributory to this admission.      SOCIAL HISTORY:  The patient drinks alcohol every 1 drink every 2-3 days.  No smoking.  Lives with  at home.  She is a full code on my review of code status with her.      ALLERGIES:  None.      REVIEW OF SYSTEMS:  See HPI for details.  Comprehensive greater than 10-point review of systems is otherwise negative besides that detailed above.      PHYSICAL EXAMINATION:   VITAL SIGNS:  Blood pressure is currently 130/90 with a heart rate of 75.  Temperature 100.1 degrees, saturation 94% on room air.   GENERAL:  The patient appears nontoxic and in no acute distress.  Appears awake, alert and oriented x 3.  She is a good historian.   HEENT:  Head is atraumatic.  Sclerae are white.  Eyelids are normal.  Conjunctivae are normal.  Extraocular movements are intact.   NECK:  Supple.  No cervical or supraclavicular lymphadenopathy.   HEART:  Regular rate and rhythm.  No significant murmurs.  No lower extremity edema.   LUNGS:  Clear to auscultation bilaterally.  No intercostal retractions.  No conversational dyspnea.   ABDOMEN:  Notable for some very mild tenderness to palpation of the lower quadrants.  The patient does not physically react when I am pushing that location, but does report that it is mildly tender.  No rebound.  No guarding.  No organomegaly.  Bowel sounds are present.   SKIN:  Shows no rash.  No jaundice.  Skin is dry to touch.   NEUROLOGIC:  Cranial nerves II-XII are intact.  Moves all extremities  appropriately.  Sensation intact to light touch in the upper and lower extremities bilaterally.   PSYCHIATRIC:  The patient is awake, alert and oriented x 3.  Mood and affect are normal and appropriate.      LABORATORY AND IMAGING DATA:  Reviewed above in HPI.      IMPRESSION AND PLAN:  Mr. Russell is a 73-year-old female with a history of rheumatoid arthritis who presents to the hospital today for concerns about abdominal pain, diarrhea, nausea, vomiting, low-grade fever.  She has no recent antibiotic use and no history of Clostridium difficile colitis.  Suspect viral gastroenteritis.   1.  Abdominal pain, nausea and vomiting with diarrhea:  Likely viral gastroenteritis.   2.  Rheumatoid arthritis on immunosuppressive medications.   3.  Abnormal right kidney on CT imaging.  The patient does not appear to have pyelonephritis with unremarkable urinalysis.  Radiology recommends repeat imaging in 3 months.  I did not discuss this with the patient.      PLAN:   1.  Admit to observation.     2.  IV fluids overnight.   3.  Check Clostridium difficile toxin and stool bacterial and viral PCR.   4.  Antiemetics and antidiarrheals as needed.   5.  Radiology recommends repeat CT scan in 3 months of right kidney.  I did not discuss this with the patient on admission, but can be discussed with the provider seeing the patient tomorrow.   6.  Observation admission, likely discharge tomorrow after hydration overnight and symptom management.         COLT INFANTE MD             D: 2019   T: 2019   MT: ADELAIDA      Name:     RISSA RUSSELL   MRN:      [id number]        Account:      [id number]   :      1945        Admitted:     2019                   Document: [id number]       cc: Gene Scott MD    Unable to assess due to medical condition

## 2021-09-14 ENCOUNTER — LAB (OUTPATIENT)
Dept: LAB | Facility: CLINIC | Age: 76
End: 2021-09-14
Payer: COMMERCIAL

## 2021-09-14 DIAGNOSIS — Z79.899 MEDICATION MANAGEMENT: ICD-10-CM

## 2021-09-14 DIAGNOSIS — R00.0 TACHYCARDIA: ICD-10-CM

## 2021-09-14 DIAGNOSIS — E78.5 HYPERLIPIDEMIA LDL GOAL <130: ICD-10-CM

## 2021-09-14 LAB
ERYTHROCYTE [DISTWIDTH] IN BLOOD BY AUTOMATED COUNT: 13.3 % (ref 10–15)
HCT VFR BLD AUTO: 39.5 % (ref 35–47)
HGB BLD-MCNC: 12.7 G/DL (ref 11.7–15.7)
MCH RBC QN AUTO: 28.9 PG (ref 26.5–33)
MCHC RBC AUTO-ENTMCNC: 32.2 G/DL (ref 31.5–36.5)
MCV RBC AUTO: 90 FL (ref 78–100)
PLATELET # BLD AUTO: 235 10E3/UL (ref 150–450)
RBC # BLD AUTO: 4.39 10E6/UL (ref 3.8–5.2)
WBC # BLD AUTO: 6.2 10E3/UL (ref 4–11)

## 2021-09-14 PROCEDURE — 84443 ASSAY THYROID STIM HORMONE: CPT

## 2021-09-14 PROCEDURE — 80061 LIPID PANEL: CPT

## 2021-09-14 PROCEDURE — 85027 COMPLETE CBC AUTOMATED: CPT

## 2021-09-14 PROCEDURE — 80053 COMPREHEN METABOLIC PANEL: CPT

## 2021-09-14 PROCEDURE — 36415 COLL VENOUS BLD VENIPUNCTURE: CPT

## 2021-09-15 LAB
ALBUMIN SERPL-MCNC: 3.5 G/DL (ref 3.4–5)
ALP SERPL-CCNC: 84 U/L (ref 40–150)
ALT SERPL W P-5'-P-CCNC: 25 U/L (ref 0–50)
ANION GAP SERPL CALCULATED.3IONS-SCNC: 5 MMOL/L (ref 3–14)
AST SERPL W P-5'-P-CCNC: 30 U/L (ref 0–45)
BILIRUB SERPL-MCNC: 0.5 MG/DL (ref 0.2–1.3)
BUN SERPL-MCNC: 12 MG/DL (ref 7–30)
CALCIUM SERPL-MCNC: 9 MG/DL (ref 8.5–10.1)
CHLORIDE BLD-SCNC: 104 MMOL/L (ref 94–109)
CHOLEST SERPL-MCNC: 216 MG/DL
CO2 SERPL-SCNC: 26 MMOL/L (ref 20–32)
CREAT SERPL-MCNC: 0.74 MG/DL (ref 0.52–1.04)
FASTING STATUS PATIENT QL REPORTED: YES
GFR SERPL CREATININE-BSD FRML MDRD: 79 ML/MIN/1.73M2
GLUCOSE BLD-MCNC: 89 MG/DL (ref 70–99)
HDLC SERPL-MCNC: 47 MG/DL
LDLC SERPL CALC-MCNC: 144 MG/DL
NONHDLC SERPL-MCNC: 169 MG/DL
POTASSIUM BLD-SCNC: 4.3 MMOL/L (ref 3.4–5.3)
PROT SERPL-MCNC: 7.2 G/DL (ref 6.8–8.8)
SODIUM SERPL-SCNC: 135 MMOL/L (ref 133–144)
TRIGL SERPL-MCNC: 127 MG/DL
TSH SERPL DL<=0.005 MIU/L-ACNC: 2.26 MU/L (ref 0.4–4)

## 2021-09-29 ENCOUNTER — TRANSFERRED RECORDS (OUTPATIENT)
Dept: HEALTH INFORMATION MANAGEMENT | Facility: CLINIC | Age: 76
End: 2021-09-29

## 2021-11-10 ENCOUNTER — OFFICE VISIT (OUTPATIENT)
Dept: INTERNAL MEDICINE | Facility: CLINIC | Age: 76
End: 2021-11-10
Payer: COMMERCIAL

## 2021-11-10 ENCOUNTER — HOSPITAL ENCOUNTER (OUTPATIENT)
Dept: MAMMOGRAPHY | Facility: CLINIC | Age: 76
Discharge: HOME | End: 2021-11-10
Attending: INTERNAL MEDICINE | Admitting: INTERNAL MEDICINE
Payer: COMMERCIAL

## 2021-11-10 VITALS
WEIGHT: 147 LBS | BODY MASS INDEX: 24.49 KG/M2 | RESPIRATION RATE: 14 BRPM | SYSTOLIC BLOOD PRESSURE: 134 MMHG | HEIGHT: 65 IN | OXYGEN SATURATION: 98 % | DIASTOLIC BLOOD PRESSURE: 86 MMHG | HEART RATE: 77 BPM | TEMPERATURE: 97.4 F

## 2021-11-10 DIAGNOSIS — Z12.31 VISIT FOR SCREENING MAMMOGRAM: ICD-10-CM

## 2021-11-10 DIAGNOSIS — M85.80 OSTEOPENIA, UNSPECIFIED LOCATION: ICD-10-CM

## 2021-11-10 DIAGNOSIS — F33.42 MAJOR DEPRESSIVE DISORDER, RECURRENT EPISODE, IN FULL REMISSION (H): Primary | ICD-10-CM

## 2021-11-10 DIAGNOSIS — F41.9 ANXIETY: ICD-10-CM

## 2021-11-10 DIAGNOSIS — M54.2 CERVICALGIA: ICD-10-CM

## 2021-11-10 PROCEDURE — 99214 OFFICE O/P EST MOD 30 MIN: CPT | Performed by: INTERNAL MEDICINE

## 2021-11-10 PROCEDURE — 77063 BREAST TOMOSYNTHESIS BI: CPT

## 2021-11-10 ASSESSMENT — MIFFLIN-ST. JEOR: SCORE: 1152.67

## 2021-11-10 NOTE — PROGRESS NOTES
Face to face time with patient today: 25 minutes (8243---1000)  Time spend in record review and charting on date of visit: >5 minutes    Assessment & Plan   (F33.42) Major depressive disorder, recurrent episode, in full remission (H)  (primary encounter diagnosis)  (F41.9) Anxiety  Comment: She has done very well and wonders about trying a gradual taper. See pt instructions and epic orders.     (M85.80) Osteopenia, unspecified location  Comment: Stable. Continue alendronate.     (M54.2) Cervicalgia  Comment: Continue chronic meds and rheumatologic f/u.        Patient Instructions   May reduce citalopram to one-half tablet two days, then full tablet the third day.     If this does NOT go well, would resume one-half alternating with a whole tablet.   Also, since we know you did fine on a full tablet daily, you could also go up to that dose if desired.    If doing well, also okay with me to reduce to as low as one-half tablet every day.     If you decide you would like to taper further, recommend appointment at that time.     If all going well, see me after 9/1/2022 for next annual wellness exam. (You may always return sooner if problems).       No follow-ups on file.    Gene Scott MD,   [facility] is a 76 year old who presents for the following health issues : Follow up.    HPI     She wonders whether she needs to remain on citalopram. She has taken this for years for initial depressive and anxiety symptoms as well as for insomnia.   She had previously followed for years with Dr Kingsley Gaming.     PHQ-9 score today is just +1.   We discussed the option of trying to very gradually taper her dose, as outline in patient instructions.     She takes alendronate. Her last DEXA was favorable.     She has noted recent pain in her neck, shoulders and anterior chest.       Past medical, family and social histories as well as medications reviewed and updated as  "needed.    Review of Systems   REVIEW OF SYSTEMS: The following systems have been completely reviewed and are negative except as noted above:   Constitutional, respiratory, cardiovascular, musculoskeletal, psychiatric, and neurologic systems.        Objective    /86   Pulse 77   Temp 97.4  F (36.3  C) (Tympanic)   Resp 14   Ht 1.651 m (5' 5\")   Wt 66.7 kg (147 lb)   LMP  (LMP Unknown)   SpO2 98%   Breastfeeding No   BMI 24.46 kg/m    Body mass index is 24.46 kg/m .  Physical Exam   GENERAL: healthy, alert and no distress  MS: no gross musculoskeletal defects noted, no edema  NEURO: Normal strength and tone, mentation intact and speech normal  PSYCH: mentation appears normal, affect normal/bright            "

## 2021-11-10 NOTE — PATIENT INSTRUCTIONS
May reduce citalopram to one-half tablet two days, then full tablet the third day.     If this does NOT go well, would resume one-half alternating with a whole tablet.   Also, since we know you did fine on a full tablet daily, you could also go up to that dose if desired.    If doing well, also okay with me to reduce to as low as one-half tablet every day.     If you decide you would like to taper further, recommend appointment at that time.     If all going well, see me after 9/1/2022 for next annual wellness exam. (You may always return sooner if problems).

## 2021-11-11 ASSESSMENT — PATIENT HEALTH QUESTIONNAIRE - PHQ9: SUM OF ALL RESPONSES TO PHQ QUESTIONS 1-9: 1

## 2021-12-08 ENCOUNTER — NURSE TRIAGE (OUTPATIENT)
Dept: NURSING | Facility: CLINIC | Age: 76
End: 2021-12-08
Payer: COMMERCIAL

## 2021-12-08 NOTE — TELEPHONE ENCOUNTER
Started having dizzy spells over the last weekend.  She reports she thinks she is not drinking enough fluid.recently.  She was advised garret drink more fluids, like H20 , juices, teas, and if she is still getting dizzy even when increasing her fluid intake, she needs to go to  with the dizziness , and see what the cause is.    Patient expressed understanding.    Maria Teresa Obregon RN   [facility] Triage/refill nurse      Reason for Disposition    Poor fluid intake probably causing dizziness    Additional Information    Negative: Shock suspected (e.g., cold/pale/clammy skin, too weak to stand, low BP, rapid pulse)    Negative: Difficult to awaken or acting confused (e.g., disoriented, slurred speech)    Negative: Fainted, and still feels dizzy afterwards    Negative: Severe difficulty breathing (e.g., struggling for each breath, speaks in single words)    Negative: Overdose (accidental or intentional) of medications    Negative: New neurologic deficit that is present now: * Weakness of the face, arm, or leg on one side of the body * Numbness of the face, arm, or leg on one side of the body * Loss of speech or garbled speech    Negative: Heart beating < 50 beats per minute OR > 140 beats per minute    Negative: Sounds like a life-threatening emergency to the triager    Protocols used: DIZZINESS-A-OH

## 2021-12-10 ENCOUNTER — HOSPITAL ENCOUNTER (EMERGENCY)
Facility: CLINIC | Age: 76
Discharge: HOME | End: 2021-12-10
Attending: EMERGENCY MEDICINE | Admitting: EMERGENCY MEDICINE
Payer: COMMERCIAL

## 2021-12-10 VITALS
DIASTOLIC BLOOD PRESSURE: 97 MMHG | HEIGHT: 65 IN | HEART RATE: 61 BPM | WEIGHT: 143 LBS | RESPIRATION RATE: 16 BRPM | TEMPERATURE: 98.1 F | SYSTOLIC BLOOD PRESSURE: 153 MMHG | BODY MASS INDEX: 23.82 KG/M2 | OXYGEN SATURATION: 95 %

## 2021-12-10 DIAGNOSIS — R42 DIZZINESS: ICD-10-CM

## 2021-12-10 LAB
ALBUMIN SERPL-MCNC: 3.2 G/DL (ref 3.4–5)
ALP SERPL-CCNC: 83 U/L (ref 40–150)
ALT SERPL W P-5'-P-CCNC: 30 U/L (ref 0–50)
ANION GAP SERPL CALCULATED.3IONS-SCNC: 4 MMOL/L (ref 3–14)
AST SERPL W P-5'-P-CCNC: 36 U/L (ref 0–45)
ATRIAL RATE - MUSE: 60 BPM
BASOPHILS # BLD AUTO: 0.1 10E3/UL (ref 0–0.2)
BASOPHILS NFR BLD AUTO: 1 %
BILIRUB SERPL-MCNC: 0.4 MG/DL (ref 0.2–1.3)
BUN SERPL-MCNC: 16 MG/DL (ref 7–30)
CALCIUM SERPL-MCNC: 9.1 MG/DL (ref 8.5–10.1)
CHLORIDE BLD-SCNC: 101 MMOL/L (ref 94–109)
CO2 SERPL-SCNC: 25 MMOL/L (ref 20–32)
CREAT SERPL-MCNC: 0.69 MG/DL (ref 0.52–1.04)
DIASTOLIC BLOOD PRESSURE - MUSE: NORMAL MMHG
EOSINOPHIL # BLD AUTO: 0.2 10E3/UL (ref 0–0.7)
EOSINOPHIL NFR BLD AUTO: 3 %
ERYTHROCYTE [DISTWIDTH] IN BLOOD BY AUTOMATED COUNT: 12.9 % (ref 10–15)
GFR SERPL CREATININE-BSD FRML MDRD: 84 ML/MIN/1.73M2
GLUCOSE BLD-MCNC: 105 MG/DL (ref 70–99)
HCT VFR BLD AUTO: 36.9 % (ref 35–47)
HGB BLD-MCNC: 12.1 G/DL (ref 11.7–15.7)
IMM GRANULOCYTES # BLD: 0 10E3/UL
IMM GRANULOCYTES NFR BLD: 0 %
INTERPRETATION ECG - MUSE: NORMAL
LYMPHOCYTES # BLD AUTO: 1.1 10E3/UL (ref 0.8–5.3)
LYMPHOCYTES NFR BLD AUTO: 16 %
MCH RBC QN AUTO: 28.7 PG (ref 26.5–33)
MCHC RBC AUTO-ENTMCNC: 32.8 G/DL (ref 31.5–36.5)
MCV RBC AUTO: 87 FL (ref 78–100)
MONOCYTES # BLD AUTO: 0.6 10E3/UL (ref 0–1.3)
MONOCYTES NFR BLD AUTO: 8 %
NEUTROPHILS # BLD AUTO: 5 10E3/UL (ref 1.6–8.3)
NEUTROPHILS NFR BLD AUTO: 72 %
NRBC # BLD AUTO: 0 10E3/UL
NRBC BLD AUTO-RTO: 0 /100
P AXIS - MUSE: 58 DEGREES
PLATELET # BLD AUTO: 222 10E3/UL (ref 150–450)
POTASSIUM BLD-SCNC: 4.2 MMOL/L (ref 3.4–5.3)
PR INTERVAL - MUSE: 180 MS
PROT SERPL-MCNC: 6.7 G/DL (ref 6.8–8.8)
QRS DURATION - MUSE: 88 MS
QT - MUSE: 400 MS
QTC - MUSE: 400 MS
R AXIS - MUSE: 41 DEGREES
RBC # BLD AUTO: 4.22 10E6/UL (ref 3.8–5.2)
SODIUM SERPL-SCNC: 130 MMOL/L (ref 133–144)
SYSTOLIC BLOOD PRESSURE - MUSE: NORMAL MMHG
T AXIS - MUSE: 70 DEGREES
TROPONIN I SERPL HS-MCNC: 7 NG/L
VENTRICULAR RATE- MUSE: 60 BPM
WBC # BLD AUTO: 7 10E3/UL (ref 4–11)

## 2021-12-10 PROCEDURE — 36415 COLL VENOUS BLD VENIPUNCTURE: CPT | Performed by: EMERGENCY MEDICINE

## 2021-12-10 PROCEDURE — 85004 AUTOMATED DIFF WBC COUNT: CPT | Performed by: EMERGENCY MEDICINE

## 2021-12-10 PROCEDURE — 84484 ASSAY OF TROPONIN QUANT: CPT | Performed by: EMERGENCY MEDICINE

## 2021-12-10 PROCEDURE — 93005 ELECTROCARDIOGRAM TRACING: CPT

## 2021-12-10 PROCEDURE — 99284 EMERGENCY DEPT VISIT MOD MDM: CPT

## 2021-12-10 PROCEDURE — 80053 COMPREHEN METABOLIC PANEL: CPT | Performed by: EMERGENCY MEDICINE

## 2021-12-10 ASSESSMENT — MIFFLIN-ST. JEOR: SCORE: 1134.52

## 2021-12-10 NOTE — ED PROVIDER NOTES
"  History   Chief Complaint:  Dizziness    The history is provided by the patient.   History supplemented by electronic chart review     Emma Jenkins is a 76 year old female with history of vertigo who presents with \"waves of dizziness\" intermittently since onset 1 week ago. At that time, the patient reports intermittent dizziness without other symptoms such as nausea or other. She reports episodes lasting for \"just a few seconds\" before fully resolving. However, she states that \"these waves of dizziness just keep coming\", and reports onset of nausea. She reports no alleviation or exacerbation of symptoms with the time of day, eating, or other activity. Considering her frequent symptoms and report that \"I'm just so tired today\", she grew concerned, prompting her presentation to the ED at this time. Of note, the patient does have history of \"vertigo\" but describes her current symptoms are \"definitely not vertigo\". She has other history of rheumatoid arthritis and takes Enbrel for management of this. The patient reports no recent medication changes. When prompted, she states that \"I want you to make this all go away\" and to \"tell me what it is and what's going on with me\". She denies any new pain, lightheadedness, palpitations, syncope, leg swelling, chest pain, shortness of breath, cough, fever, hematuria, or hemachezia. She has had no recent injuries or other trauma.    Review of Systems   All other systems reviewed and are negative.    Allergies:  No known drug allergies    Medications:  Acyclovir  Alendronate  Aspirin  Calcium carbonate  Cholecalciferol  Citalopram  Etanercept  Leflunomide  Meclizine  Metoprolol tartrate  Omeprazole  Polyethylene glycol  Trazodone  Enbrel  Etanercept    Past Medical History:     Anxiety  Depression  Rheumatoid arthritis  Depressive disorder  GERD  Gastroenteritis  Sicca syndrome  Hyperlipidemia  Basal cell carcinoma  Alopecia areata    Past Surgical History:  " "  Appendectomy  Blepharoplasty bilateral  Colonoscopy x2  Hysterectomy  Orthopedic surgery  Remove hardware hand    Family History:    Colon cancer x2  Depression x2  Anxiety disorder x2    Social History:  The patient presented with her .  The patient arrived in a private vehicle.    Physical Exam     Patient Vitals for the past 24 hrs:   BP Temp Temp src Pulse Resp SpO2 Height Weight   12/10/21 1130 (!) 153/97 -- -- 61 16 95 % -- --   12/10/21 1059 -- 98.1  F (36.7  C) Oral -- -- -- -- --   12/10/21 1049 -- -- -- -- -- -- 1.651 m (5' 5\") 64.9 kg (143 lb)     Physical Exam  General: Nontoxic appearing woman sitting upright in room 12,  at bedside  HENT: mucous membranes moist, TMs clear  Eyes: PERRL, EOMI, no nystagmus  CV: rate as above, regular rhythm, no lower extremity edema  Resp: normal effort, speaks in full phrases, no cough observed  GI: abdomen soft and nontender, no guarding  MSK: no bony tenderness, mastoids nontender  Wearing athletic shoes with turquoise laces  Skin: appropriately warm and dry, no facial rash  Neuro: awake, alert, clear speech, fully oriented, face symmetric,  normal, finger-nose normal bilaterally, strength and sensation intact in all extr, no nuchal rigidity, ambulatory  Psych: cooperative, anxious, pleasant    Emergency Department Course     ECG  ECG taken at 1029, ECG read at 1031  Normal sinus rhythm.  Rate 60 bpm. KY interval 180 ms. QRS duration 88 ms. QT/QTc 400/400 ms. P-R-T axis 58 41 70.     Laboratory:  Labs Ordered and Resulted from Time of ED Arrival to Time of ED Departure   COMPREHENSIVE METABOLIC PANEL - Abnormal       Result Value    Sodium 130 (*)     Potassium 4.2      Chloride 101      Carbon Dioxide (CO2) 25      Anion Gap 4      Urea Nitrogen 16      Creatinine 0.69      Calcium 9.1      Glucose 105 (*)     Alkaline Phosphatase 83      AST 36      ALT 30      Protein Total 6.7 (*)     Albumin 3.2 (*)     Bilirubin Total 0.4      GFR Estimate " 84     TROPONIN I - Normal    Troponin I High Sensitivity 7     CBC WITH PLATELETS AND DIFFERENTIAL    WBC Count 7.0      RBC Count 4.22      Hemoglobin 12.1      Hematocrit 36.9      MCV 87      MCH 28.7      MCHC 32.8      RDW 12.9      Platelet Count 222      % Neutrophils 72      % Lymphocytes 16      % Monocytes 8      % Eosinophils 3      % Basophils 1      % Immature Granulocytes 0      NRBCs per 100 WBC 0      Absolute Neutrophils 5.0      Absolute Lymphocytes 1.1      Absolute Monocytes 0.6      Absolute Eosinophils 0.2      Absolute Basophils 0.1      Absolute Immature Granulocytes 0.0      Absolute NRBCs 0.0       Emergency Department Course:    Reviewed:  I reviewed nursing notes, vitals, past medical history and Care Everywhere.    Assessments:  1032 I obtained history and examined the patient as noted above.   1058 I rechecked the patient.  1136 I rechecked the patient and explained findings. I believe that they are safe for discharge at this time.     Disposition:  The patient was discharged to home.     Impression & Plan     Medical Decision Making:  She presents with extremely brief paroxysmal dizziness without clear identified etiology.  Highly doubt acute neurovascular process such as ischemic stroke or vertebrobasilar insufficiency given the exceptionally brief nature of her symptoms along with a completely normal neurologic exam.  No clear provoking factors.  Vital signs are satisfactory.  Neurologic exam is completely normal.  No signs of hypotension or serious metabolic abnormality.  Highly doubt manifestation of acute cardiac disease.  The diagnostic uncertainty as well as reassurances of today's test were reviewed with this very pleasant patient, who is comfortable to plan for discharge home and close outpatient follow-up.  Return to care promptly in the unexpected event of sudden worsening at any hour.    Diagnosis:    ICD-10-CM    1. Dizziness  R42      Scribe Disclosure:  Shakira DE  Venus, booker serving as a scribe at 10:22 AM on 12/10/2021 to document services personally performed by Rudi Arellano MD based on my observations and the provider's statements to me.    This note was completed in part using Dragon voice recognition software. Although reviewed after completion, some word and grammatical errors may occur.     Rudi Arellano MD  12/10/21 [id number]

## 2021-12-13 ENCOUNTER — TRANSFERRED RECORDS (OUTPATIENT)
Dept: HEALTH INFORMATION MANAGEMENT | Facility: CLINIC | Age: 76
End: 2021-12-13
Payer: COMMERCIAL

## 2021-12-13 LAB
ALT SERPL-CCNC: 21 IU/L (ref 5–35)
AST SERPL-CCNC: 39 U/L (ref 5–34)
CREATININE (EXTERNAL): 0.68 MG/DL (ref 0.5–1.3)
GFR ESTIMATED (EXTERNAL): 89.2 ML/MIN/1.73M2

## 2021-12-19 ENCOUNTER — MYC MEDICAL ADVICE (OUTPATIENT)
Dept: INTERNAL MEDICINE | Facility: CLINIC | Age: 76
End: 2021-12-19
Payer: COMMERCIAL

## 2021-12-19 DIAGNOSIS — R42 DIZZINESS: ICD-10-CM

## 2021-12-20 RX ORDER — MECLIZINE HYDROCHLORIDE 25 MG/1
25 TABLET ORAL 3 TIMES DAILY PRN
Qty: 30 TABLET | Refills: 0 | Status: SHIPPED | OUTPATIENT
Start: 2021-12-20 | End: 2022-10-07

## 2021-12-20 NOTE — TELEPHONE ENCOUNTER
Patient continues to experience dizziness - she was seen in ER for this on 12/10/21.     Routed to provider to review for recommendations for ongoing dizziness - should she schedule appointment in clinic or see PT/OT for therapy?    Patient also requesting refill for Meclizine.  Routing refill request to provider for review/approval because:  A break in medication     Susan Benedict RN  [facility]

## 2022-01-02 ENCOUNTER — E-VISIT (OUTPATIENT)
Dept: INTERNAL MEDICINE | Facility: CLINIC | Age: 77
End: 2022-01-02
Payer: COMMERCIAL

## 2022-01-02 DIAGNOSIS — Z20.822 ENCOUNTER FOR LABORATORY TESTING FOR COVID-19 VIRUS: ICD-10-CM

## 2022-01-02 DIAGNOSIS — Z20.822 SUSPECTED COVID-19 VIRUS INFECTION: ICD-10-CM

## 2022-01-02 DIAGNOSIS — R05.9 COUGH: ICD-10-CM

## 2022-01-02 PROCEDURE — 99421 OL DIG E/M SVC 5-10 MIN: CPT | Performed by: INTERNAL MEDICINE

## 2022-01-02 NOTE — PATIENT INSTRUCTIONS
Emma,      Based on your responses, you may have coronavirus (COVID-19). This illness can cause fever, cough and trouble breathing. Many people get a mild case and get better on their own. Some people can get very sick.    Will I be tested for COVID-19?  We would like to test you for COVID-19 virus. I have placed orders for this test.     To schedule: go to your [facility] home page and scroll down to the section that says  You have an appointment that needs to be scheduled  and click the large green button that says  Schedule Now  and follow the steps to find the next available openings.    If you are unable to complete these [facility] scheduling steps, please call [phone] to schedule your testing.     Return to work/school/ guidance:  Please let your workplace manager and staffing office know when your isolation ends.       If you receive a positive COVID-19 test result, follow the guidance of the those who are giving you the results. Usually the return to work is 10 days from symptom onset or positive test date, (or in some cases 20 days if you are immunocompromised). If your symptoms started after your positive test, the 10 days should start when your symptoms started.   o If you work at [employer], you must also be cleared by Employee Occupational Health and Safety to return to work.      If you receive a negative COVID-19 test result and did not have a high risk exposure to someone with a known positive COVID-19 test, you can return to work once you're free of fever for 24 hours without fever-reducing medication and your symptoms are improving or resolved.    If you receive a negative COVID-19 test and had a high-risk exposure to someone who has tested positive for COVID-19 then you can return to work 14 days after your last contact with the positive individual. Follow quarantine guidance given by your doctor or public health officials.     Sign up for [employer] Loop:  We know it's scary to hear  that you might have COVID-19. We want to track your symptoms to make sure you're okay over the next 2 weeks. Please look for an email from [employer]--this is a free, online program that we'll use to keep in touch. To sign up, follow the link in the email you will receive. Learn more at [email or web address]    How can I take care of myself?  Over the counter medications may help with your symptoms like congestion, cough, chills, or fever.    There are not many effective prescription treatments for early COVID-19. Hydroxychloroquine, ivermectin, and azithromycin are not effective or recommended for COVID-19.    If your symptoms started in the last 10 days, you may be able to receive a treatment with monoclonal antibodies. This treatment can lower your risk of severe illness and going to the hospital. It is given through an IV or under your skin (subcutaneous) and must be given at an infusion center. You must be 12 or older, weight at least 88 pounds, and have a positive COVID-19 test.     If you would like to sign up to be considered to receive the monoclonal antibody medicine, please complete a participation form through the [facility] here: MNRAP ([email or web address]). You may also call the [facility] COVID-19 Public Hotline at 1-305.417.9485 (open Mon-Fri: 9am-7pm and Sat: 10am-6pm).     Not all people who are eligible will receive the medicine, since supply is limited. You will be contacted in the next 1 to 2 business days only if you are selected. If you do not receive a call, you have not been selected to receive the medicine. If you have any questions about this medication, please contact your primary care provider. For more information, see [email or web address]      Get lots of rest. Drink extra fluids (unless a doctor has told you not to)    Take Tylenol (acetaminophen) or ibuprofen for fever or  pain. If you have liver or kidney problems, ask your family doctor if it's okay to take Tylenol o ibuprofen    Take over the counter medications for your symptoms, as directed by your doctor. You may also talk to your pharmacist.      If you have other health problems (like cancer, heart failure, an organ transplant or severe kidney disease): Call your specialty clinic if you don't feel better in the next 2 days.    Know when to call 911. Emergency warning signs include:  o Trouble breathing or shortness of breath  o Pain or pressure in the chest that doesn't go away  o Feeling confused like you haven't felt before, or not being able to wake up  o Bluish-colored lips or face    Where can I get more information?     [facility] - About COVID-19: [email or web address]     CDC - What to Do If You're Sick:     www.cdc.gov/coronavirus/2019-ncov/about/steps-when-sick.html    CDC - Ending Home Isolation:  https://www.cdc.gov/coronavirus/2019-ncov/your-health/quarantine-isolation.html    CDC - Caring for Someone:  www.cdc.gov/coronavirus/2019-ncov/if-you-are-sick/care-for-someone.html    [facility] clinical trials (COVID-19 research studies): [email or web address]-trials    Below are the COVID-19 hotlines at the [facility] of [facility] ([facility]). Interpreters are available.  o For health questions: Call 473-867-6037 or 1-317.771.5838 (7 a.m. to 7 p.m.)  o For questions about schools and childcare: Call 645-990-6419 or 1-712.519.6653 (7 a.m. to 7 p.m.)

## 2022-01-22 ENCOUNTER — APPOINTMENT (OUTPATIENT)
Dept: URGENT CARE | Facility: CLINIC | Age: 77
End: 2022-01-22
Payer: COMMERCIAL

## 2022-01-22 ENCOUNTER — MYC MEDICAL ADVICE (OUTPATIENT)
Dept: INTERNAL MEDICINE | Facility: CLINIC | Age: 77
End: 2022-01-22
Payer: COMMERCIAL

## 2022-01-23 ENCOUNTER — E-VISIT (OUTPATIENT)
Dept: INTERNAL MEDICINE | Facility: CLINIC | Age: 77
End: 2022-01-23
Payer: COMMERCIAL

## 2022-07-14 ENCOUNTER — TRANSFERRED RECORDS (OUTPATIENT)
Dept: INTERNAL MEDICINE | Facility: CLINIC | Age: 77
End: 2022-07-14

## 2022-07-14 LAB
ALT SERPL-CCNC: 18 LU/L (ref 5–35)
AST SERPL-CCNC: 31 U/L (ref 5–34)
CREATININE (EXTERNAL): 0.66 MG/DL (ref 0.5–1.3)

## 2022-10-03 ENCOUNTER — TRANSFERRED RECORDS (OUTPATIENT)
Dept: HEALTH INFORMATION MANAGEMENT | Facility: CLINIC | Age: 77
End: 2022-10-03

## 2022-10-03 LAB
ALT SERPL-CCNC: 14 IU/L (ref 5–35)
AST SERPL-CCNC: 27 U/L (ref 5–34)
CREATININE (EXTERNAL): 0.79 MG/DL (ref 0.5–1.3)
GFR ESTIMATED (EXTERNAL): 75 ML/MIN/1.73M2

## 2022-10-04 ASSESSMENT — ENCOUNTER SYMPTOMS
HEMATURIA: 0
NERVOUS/ANXIOUS: 0
DIZZINESS: 0
NAUSEA: 0
PALPITATIONS: 0
HEMATOCHEZIA: 0
CONSTIPATION: 1
FREQUENCY: 0
JOINT SWELLING: 0
SHORTNESS OF BREATH: 0
PARESTHESIAS: 0
FEVER: 0
MYALGIAS: 0
ABDOMINAL PAIN: 0
DIARRHEA: 0
DYSURIA: 0
CHILLS: 0
HEARTBURN: 0
WEAKNESS: 0
SORE THROAT: 0
HEADACHES: 0
BREAST MASS: 0
EYE PAIN: 0
ARTHRALGIAS: 0
COUGH: 0

## 2022-10-04 ASSESSMENT — ACTIVITIES OF DAILY LIVING (ADL): CURRENT_FUNCTION: NO ASSISTANCE NEEDED

## 2022-10-07 ENCOUNTER — OFFICE VISIT (OUTPATIENT)
Dept: INTERNAL MEDICINE | Facility: CLINIC | Age: 77
End: 2022-10-07
Payer: COMMERCIAL

## 2022-10-07 VITALS
HEART RATE: 72 BPM | HEIGHT: 66 IN | SYSTOLIC BLOOD PRESSURE: 148 MMHG | TEMPERATURE: 97.8 F | OXYGEN SATURATION: 97 % | BODY MASS INDEX: 23.95 KG/M2 | DIASTOLIC BLOOD PRESSURE: 86 MMHG | RESPIRATION RATE: 16 BRPM | WEIGHT: 149 LBS

## 2022-10-07 DIAGNOSIS — D36.9 ADENOMATOUS POLYPS: ICD-10-CM

## 2022-10-07 DIAGNOSIS — Z11.59 NEED FOR HEPATITIS C SCREENING TEST: ICD-10-CM

## 2022-10-07 DIAGNOSIS — F33.42 MAJOR DEPRESSIVE DISORDER, RECURRENT EPISODE, IN FULL REMISSION (H): ICD-10-CM

## 2022-10-07 DIAGNOSIS — G47.00 INSOMNIA, UNSPECIFIED TYPE: ICD-10-CM

## 2022-10-07 DIAGNOSIS — B00.9 RECURRENT HSV (HERPES SIMPLEX VIRUS): ICD-10-CM

## 2022-10-07 DIAGNOSIS — R32 URINARY INCONTINENCE, UNSPECIFIED TYPE: ICD-10-CM

## 2022-10-07 DIAGNOSIS — G89.29 CHRONIC LEFT-SIDED LOW BACK PAIN WITHOUT SCIATICA: ICD-10-CM

## 2022-10-07 DIAGNOSIS — R42 DIZZINESS: ICD-10-CM

## 2022-10-07 DIAGNOSIS — M54.50 CHRONIC LEFT-SIDED LOW BACK PAIN WITHOUT SCIATICA: ICD-10-CM

## 2022-10-07 DIAGNOSIS — M85.80 OSTEOPENIA, UNSPECIFIED LOCATION: ICD-10-CM

## 2022-10-07 DIAGNOSIS — R00.0 TACHYCARDIA: ICD-10-CM

## 2022-10-07 DIAGNOSIS — E78.5 HYPERLIPIDEMIA LDL GOAL <130: ICD-10-CM

## 2022-10-07 DIAGNOSIS — K21.9 GASTROESOPHAGEAL REFLUX DISEASE WITHOUT ESOPHAGITIS: ICD-10-CM

## 2022-10-07 DIAGNOSIS — Z00.00 ENCOUNTER FOR MEDICARE ANNUAL WELLNESS EXAM: Primary | ICD-10-CM

## 2022-10-07 DIAGNOSIS — Z78.0 MENOPAUSE: ICD-10-CM

## 2022-10-07 DIAGNOSIS — M35.00 SICCA SYNDROME (H): ICD-10-CM

## 2022-10-07 LAB
BASOPHILS # BLD AUTO: 0 10E3/UL (ref 0–0.2)
BASOPHILS NFR BLD AUTO: 0 %
EOSINOPHIL # BLD AUTO: 0.1 10E3/UL (ref 0–0.7)
EOSINOPHIL NFR BLD AUTO: 2 %
ERYTHROCYTE [DISTWIDTH] IN BLOOD BY AUTOMATED COUNT: 13.4 % (ref 10–15)
HCT VFR BLD AUTO: 39 % (ref 35–47)
HGB BLD-MCNC: 12.6 G/DL (ref 11.7–15.7)
IMM GRANULOCYTES # BLD: 0 10E3/UL
IMM GRANULOCYTES NFR BLD: 0 %
LYMPHOCYTES # BLD AUTO: 1.7 10E3/UL (ref 0.8–5.3)
LYMPHOCYTES NFR BLD AUTO: 27 %
MCH RBC QN AUTO: 28.6 PG (ref 26.5–33)
MCHC RBC AUTO-ENTMCNC: 32.3 G/DL (ref 31.5–36.5)
MCV RBC AUTO: 88 FL (ref 78–100)
MONOCYTES # BLD AUTO: 0.7 10E3/UL (ref 0–1.3)
MONOCYTES NFR BLD AUTO: 10 %
NEUTROPHILS # BLD AUTO: 3.8 10E3/UL (ref 1.6–8.3)
NEUTROPHILS NFR BLD AUTO: 60 %
PLATELET # BLD AUTO: 226 10E3/UL (ref 150–450)
RBC # BLD AUTO: 4.41 10E6/UL (ref 3.8–5.2)
WBC # BLD AUTO: 6.4 10E3/UL (ref 4–11)

## 2022-10-07 PROCEDURE — 86803 HEPATITIS C AB TEST: CPT | Performed by: INTERNAL MEDICINE

## 2022-10-07 PROCEDURE — 80061 LIPID PANEL: CPT | Performed by: INTERNAL MEDICINE

## 2022-10-07 PROCEDURE — 80053 COMPREHEN METABOLIC PANEL: CPT | Performed by: INTERNAL MEDICINE

## 2022-10-07 PROCEDURE — 99214 OFFICE O/P EST MOD 30 MIN: CPT | Mod: 25 | Performed by: INTERNAL MEDICINE

## 2022-10-07 PROCEDURE — 84443 ASSAY THYROID STIM HORMONE: CPT | Performed by: INTERNAL MEDICINE

## 2022-10-07 PROCEDURE — 36415 COLL VENOUS BLD VENIPUNCTURE: CPT | Performed by: INTERNAL MEDICINE

## 2022-10-07 PROCEDURE — 85027 COMPLETE CBC AUTOMATED: CPT | Performed by: INTERNAL MEDICINE

## 2022-10-07 PROCEDURE — G0439 PPPS, SUBSEQ VISIT: HCPCS | Performed by: INTERNAL MEDICINE

## 2022-10-07 RX ORDER — ALENDRONATE SODIUM 70 MG/1
TABLET ORAL
Qty: 13 TABLET | Refills: 3 | Status: SHIPPED | OUTPATIENT
Start: 2022-10-07 | End: 2023-11-28

## 2022-10-07 RX ORDER — TRAZODONE HYDROCHLORIDE 50 MG/1
TABLET, FILM COATED ORAL
Qty: 90 TABLET | Refills: 3 | Status: SHIPPED | OUTPATIENT
Start: 2022-10-07 | End: 2023-11-08

## 2022-10-07 RX ORDER — ACYCLOVIR 400 MG/1
400 TABLET ORAL 3 TIMES DAILY
Qty: 15 TABLET | Refills: 3 | Status: SHIPPED | OUTPATIENT
Start: 2022-10-07 | End: 2023-11-28

## 2022-10-07 RX ORDER — PRAVASTATIN SODIUM 10 MG
10 TABLET ORAL DAILY
Qty: 90 TABLET | Refills: 3 | Status: SHIPPED | OUTPATIENT
Start: 2022-10-07 | End: 2023-09-26

## 2022-10-07 RX ORDER — MECLIZINE HYDROCHLORIDE 25 MG/1
25 TABLET ORAL 3 TIMES DAILY PRN
Qty: 30 TABLET | Refills: 4 | Status: SHIPPED | OUTPATIENT
Start: 2022-10-07 | End: 2023-11-28

## 2022-10-07 RX ORDER — METOPROLOL TARTRATE 50 MG
TABLET ORAL
Qty: 135 TABLET | Refills: 3 | Status: SHIPPED | OUTPATIENT
Start: 2022-10-07 | End: 2023-11-01

## 2022-10-07 RX ORDER — OMEPRAZOLE 40 MG/1
CAPSULE, DELAYED RELEASE ORAL
Qty: 90 CAPSULE | Refills: 3 | Status: SHIPPED | OUTPATIENT
Start: 2022-10-07 | End: 2023-09-26

## 2022-10-07 RX ORDER — CITALOPRAM HYDROBROMIDE 20 MG/1
20 TABLET ORAL DAILY
Qty: 90 TABLET | Refills: 3 | Status: SHIPPED | OUTPATIENT
Start: 2022-10-07 | End: 2023-11-07

## 2022-10-07 SDOH — HEALTH STABILITY: PHYSICAL HEALTH: ON AVERAGE, HOW MANY MINUTES DO YOU ENGAGE IN EXERCISE AT THIS LEVEL?: 60 MIN

## 2022-10-07 SDOH — ECONOMIC STABILITY: INCOME INSECURITY: IN THE LAST 12 MONTHS, WAS THERE A TIME WHEN YOU WERE NOT ABLE TO PAY THE MORTGAGE OR RENT ON TIME?: NO

## 2022-10-07 SDOH — ECONOMIC STABILITY: FOOD INSECURITY: WITHIN THE PAST 12 MONTHS, YOU WORRIED THAT YOUR FOOD WOULD RUN OUT BEFORE YOU GOT MONEY TO BUY MORE.: NEVER TRUE

## 2022-10-07 SDOH — HEALTH STABILITY: PHYSICAL HEALTH: ON AVERAGE, HOW MANY DAYS PER WEEK DO YOU ENGAGE IN MODERATE TO STRENUOUS EXERCISE (LIKE A BRISK WALK)?: 6 DAYS

## 2022-10-07 SDOH — ECONOMIC STABILITY: FOOD INSECURITY: WITHIN THE PAST 12 MONTHS, THE FOOD YOU BOUGHT JUST DIDN'T LAST AND YOU DIDN'T HAVE MONEY TO GET MORE.: NEVER TRUE

## 2022-10-07 ASSESSMENT — SOCIAL DETERMINANTS OF HEALTH (SDOH)
WITHIN THE LAST YEAR, HAVE YOU BEEN AFRAID OF YOUR PARTNER OR EX-PARTNER?: NO
WITHIN THE LAST YEAR, HAVE YOU BEEN HUMILIATED OR EMOTIONALLY ABUSED IN OTHER WAYS BY YOUR PARTNER OR EX-PARTNER?: NO
WITHIN THE LAST YEAR, HAVE YOU BEEN KICKED, HIT, SLAPPED, OR OTHERWISE PHYSICALLY HURT BY YOUR PARTNER OR EX-PARTNER?: NO
HOW HARD IS IT FOR YOU TO PAY FOR THE VERY BASICS LIKE FOOD, HOUSING, MEDICAL CARE, AND HEATING?: NOT HARD AT ALL
WITHIN THE LAST YEAR, HAVE TO BEEN RAPED OR FORCED TO HAVE ANY KIND OF SEXUAL ACTIVITY BY YOUR PARTNER OR EX-PARTNER?: NO

## 2022-10-07 ASSESSMENT — PATIENT HEALTH QUESTIONNAIRE - PHQ9
SUM OF ALL RESPONSES TO PHQ QUESTIONS 1-9: 0
10. IF YOU CHECKED OFF ANY PROBLEMS, HOW DIFFICULT HAVE THESE PROBLEMS MADE IT FOR YOU TO DO YOUR WORK, TAKE CARE OF THINGS AT HOME, OR GET ALONG WITH OTHER PEOPLE: NOT DIFFICULT AT ALL
SUM OF ALL RESPONSES TO PHQ QUESTIONS 1-9: 0

## 2022-10-07 ASSESSMENT — ENCOUNTER SYMPTOMS
HEADACHES: 0
FEVER: 0
SORE THROAT: 0
NAUSEA: 0
FREQUENCY: 0
MYALGIAS: 0
CONSTIPATION: 1
PALPITATIONS: 0
DIARRHEA: 0
PARESTHESIAS: 0
HEARTBURN: 0
HEMATOCHEZIA: 0
DYSURIA: 0
ABDOMINAL PAIN: 0
BREAST MASS: 0
ARTHRALGIAS: 0
SHORTNESS OF BREATH: 0
NERVOUS/ANXIOUS: 0
EYE PAIN: 0
JOINT SWELLING: 0
DIZZINESS: 0
COUGH: 0
CHILLS: 0
WEAKNESS: 0
HEMATURIA: 0

## 2022-10-07 ASSESSMENT — LIFESTYLE VARIABLES
HOW OFTEN DO YOU HAVE A DRINK CONTAINING ALCOHOL: 4 OR MORE TIMES A WEEK
AUDIT-C TOTAL SCORE: 4
HOW MANY STANDARD DRINKS CONTAINING ALCOHOL DO YOU HAVE ON A TYPICAL DAY: 1 OR 2
HOW OFTEN DO YOU HAVE SIX OR MORE DRINKS ON ONE OCCASION: NEVER
SKIP TO QUESTIONS 9-10: 1

## 2022-10-07 ASSESSMENT — ACTIVITIES OF DAILY LIVING (ADL): CURRENT_FUNCTION: NO ASSISTANCE NEEDED

## 2022-10-07 NOTE — PROGRESS NOTES
"SUBJECTIVE:   Emma is a 76 year old who presents for Preventive Visit.      Patient has been advised of split billing requirements and indicates understanding: Yes  Are you in the first 12 months of your Medicare coverage?  No    Healthy Habits:     In general, how would you rate your overall health?  Good    Frequency of exercise:  4-5 days/week    Duration of exercise:  45-60 minutes    Do you usually eat at least 4 servings of fruit and vegetables a day, include whole grains    & fiber and avoid regularly eating high fat or \"junk\" foods?  No    Taking medications regularly:  Yes    Medication side effects:  None    Ability to successfully perform activities of daily living:  No assistance needed    Home Safety:  No safety concerns identified    Hearing Impairment:  Difficulty understanding soft or whispered speech    In the past 6 months, have you been bothered by leaking of urine? Yes    In general, how would you rate your overall mental or emotional health?  Good      PHQ-2 Total Score: 0    Additional concerns today:  No           Do you feel safe in your environment? Yes    Have you ever done Advance Care Planning? (For example, a Health Directive, POLST, or a discussion with a medical provider or your loved ones about your wishes): Yes, patient states has an Advance Care Planning document and will bring a copy to the clinic.       Fall risk  Fallen 2 or more times in the past year?: No  Any fall with injury in the past year?: No    Cognitive Screening   1) Repeat 3 items (Leader, Season, Table)    2) Clock draw: NORMAL  3) 3 item recall: Recalls 3 objects  Results: 3 items recalled: COGNITIVE IMPAIRMENT LESS LIKELY    Mini-CogTM Copyright [employer]. Licensed by the author for use in [facility]; reprinted with permission ([email or web address]). All rights reserved.      Do you have sleep apnea, excessive snoring or daytime drowsiness?: no    Reviewed and updated as needed this visit by clinical staff   " Tobacco  Allergies  Meds   Med Hx  Surg Hx  Fam Hx  Soc Hx          Reviewed and updated as needed this visit by Provider                   Social History     Tobacco Use     Smoking status: Former Smoker     Packs/day: 0.50     Years: 10.00     Pack years: 5.00     Types: Cigarettes     Start date: 1965     Quit date: 1975     Years since quittin.4     Smokeless tobacco: Never Used     Tobacco comment: Would smoke in social settings   Substance Use Topics     Alcohol use: Yes     Alcohol/week: 4.0 standard drinks     Types: 4 Glasses of wine per week     Comment: Approximately 4 glasses per week     If you drink alcohol do you typically have >3 drinks per day or >7 drinks per week? No    Alcohol Use 10/4/2022   Prescreen: >3 drinks/day or >7 drinks/week? No   Prescreen: >3 drinks/day or >7 drinks/week? -         PROBLEMS TO ADD ON...In addition to an Annual Wellness Exam, we addressed depression, osteopenia, prior tachycardia, GERD, dizziness, hyperlipidemia, insomnia, recurrent HSV, left sided low back pain.     The patient is tolerating her current medications without any adverse effects.  She is offered refills of meds for the following conditions:  Acyclovir for recurrent HSV  Citalopram helpful for chronic depressive symptoms.  Meclizine helpful for episodic dizziness.   Metoprolol taken for BP and prior tachycardia.   Tolerating alendronate for osteopenia. We agreed to update a DEXA scan.   Omeprazole helpful for GERD symptoms. No dysphagia or odynophagia.   Pravastatin helpful for hyperlipidemia.   Trazodone helps her to sleep.   Miralax helps for chronic constipation.   She would like a referral to Physical Therapy for chronic back pain, and to Urology or Uro/Gyn for chronic urinary incontinence.     Current providers sharing in care for this patient include:   Patient Care Team:  Gene Scott MD as PCP - General (Internal Medicine-Hematology & Oncology)  Karsten Malone MD as MD  (Internal Medicine)  Gene Scott MD as Assigned PCP    The following health maintenance items are reviewed in Epic and correct as of today:  Health Maintenance   Topic Date Due     DEPRESSION ACTION PLAN  Never done     HEPATITIS C SCREENING  Never done     COVID-19 Vaccine (5 - Booster for Pfizer series) 06/15/2022     MEDICARE ANNUAL WELLNESS VISIT  09/01/2022     ANNUAL REVIEW OF HM ORDERS  09/01/2022     INFLUENZA VACCINE (1) 09/01/2022     PHQ-9  04/07/2023     FALL RISK ASSESSMENT  10/07/2023     LIPID  09/14/2026     ADVANCE CARE PLANNING  09/16/2026     DTAP/TDAP/TD IMMUNIZATION (4 - Td or Tdap) 10/17/2026     DEXA  10/03/2033     Pneumococcal Vaccine: 65+ Years  Completed     ZOSTER IMMUNIZATION  Completed     IPV IMMUNIZATION  Aged Out     MENINGITIS IMMUNIZATION  Aged Out     HEPATITIS B IMMUNIZATION  Aged Out     Pneumonia Vaccine:  Patient has received both pneumonia vaccinations (Prevnar-13 and Pneumovax-23)   Mammogram Screening: Mammogram Screening - Patient over age 75, has elected to continue with screening.    Pertinent mammograms are reviewed under the imaging tab.    Review of Systems   Constitutional: Negative for chills and fever.   HENT: Negative for congestion, ear pain, hearing loss and sore throat.    Eyes: Negative for pain and visual disturbance.   Respiratory: Negative for cough and shortness of breath.    Cardiovascular: Negative for chest pain, palpitations and peripheral edema.   Gastrointestinal: Positive for constipation. Negative for abdominal pain, diarrhea, heartburn, hematochezia and nausea.   Breasts:  Negative for tenderness, breast mass and discharge.   Genitourinary: Negative for dysuria, frequency, genital sores, hematuria, pelvic pain, urgency, vaginal bleeding and vaginal discharge.   Musculoskeletal: Negative for arthralgias, joint swelling and myalgias.   Skin: Negative for rash.   Neurological: Negative for dizziness, weakness, headaches and paresthesias.  "  Psychiatric/Behavioral: Negative for mood changes. The patient is not nervous/anxious.        OBJECTIVE:   BP (!) 148/86   Pulse 72   Temp 97.8  F (36.6  C) (Tympanic)   Resp 16   Ht 1.664 m (5' 5.5\")   Wt 67.6 kg (149 lb)   LMP  (LMP Unknown)   SpO2 97%   Breastfeeding No   BMI 24.42 kg/m   Estimated body mass index is 24.42 kg/m  as calculated from the following:    Height as of this encounter: 1.664 m (5' 5.5\").    Weight as of this encounter: 67.6 kg (149 lb).     Physical Exam  GENERAL: healthy, alert and no distress  EYES: Eyes grossly normal to inspection, PERRL and conjunctivae and sclerae normal  HENT: ear canals and TM's normal, nose and mouth without ulcers or lesions  NECK: no adenopathy, no asymmetry, masses, or scars and thyroid normal to palpation  RESP: lungs clear to auscultation - no rales, rhonchi or wheezes  BREAST/PELVIC: exams not performed.  CV: regular rate and rhythm, normal S1 S2, no S3 or S4, no murmur, click or rub, no peripheral edema and peripheral pulses strong  ABDOMEN: soft, nontender, no hepatosplenomegaly, no masses and bowel sounds normal  MS: no gross musculoskeletal defects noted, no edema  SKIN: no suspicious lesions or rashes  NEURO: Normal strength and tone, mentation intact and speech normal  PSYCH: mentation appears normal, affect normal/bright    Diagnostic Test Results:  Labs reviewed in Epic    ASSESSMENT / PLAN:   (Z00.00) Encounter for Medicare annual wellness exam  (primary encounter diagnosis)  Comment: Stable health. See epic orders.     (F33.42) Major depressive disorder, recurrent episode, in full remission (H)  Comment: Well controlled. Continue current meds.   Plan: citalopram (CELEXA) 20 MG tablet, OFFICE/OUTPT         VISIT,EST,LEVL IV          (M85.80) Osteopenia, unspecified location  Comment: Refill alendronate. Update DEXA.   Plan: alendronate (FOSAMAX) 70 MG tablet,         OFFICE/OUTPT VISIT,EST,LEVL IV          (R00.0) Tachycardia  Comment: " Controlled. Continue current meds.   Plan: metoprolol tartrate (LOPRESSOR) 50 MG tablet,         OFFICE/OUTPT VISIT,EST,LEVL IV          (K21.9) Gastroesophageal reflux disease without esophagitis  Comment: Well controlled. Continue current meds.   Plan: omeprazole (PRILOSEC) 40 MG DR capsule,         OFFICE/OUTPT VISIT,EST,LEVL IV          (R42) Dizziness  Comment: Refilled meclizine for episodic use.   Plan: meclizine (ANTIVERT) 25 MG tablet, OFFICE/OUTPT        VISIT,EST,LEVL IV          (E78.5) Hyperlipidemia LDL goal <130  Comment: Update lipids. Continue current meds.   Plan: pravastatin (PRAVACHOL) 10 MG tablet,         OFFICE/OUTPT VISIT,EST,LEVL IV          (G47.00) Insomnia, unspecified type  Comment: Stable. Continue current meds.   Plan: traZODone (DESYREL) 50 MG tablet, OFFICE/OUTPT         VISIT,EST,LEVL IV          (R32) Urinary incontinence, unspecified type  Comment: Chronic symptom. Offered referral.   Plan: Adult Uro/Gyn  Referral, OFFICE/OUTPT         VISIT,EST,LEVL IV          (B00.9) Recurrent HSV (herpes simplex virus)  Comment: Refill acyclovir for episodic recurrence.   Plan: acyclovir (ZOVIRAX) 400 MG tablet, OFFICE/OUTPT        VISIT,EST,LEVL IV          (M54.50,  G89.29) Chronic left-sided low back pain without sciatica  Comment: Offered Physical Therapy referral.   Plan: OFFICE/OUTPT VISIT,EST,LEVL IV, Physical         Therapy Referral          (M35.00) Sicca syndrome (H)  Comment: Stable in recent months.     (Z78.0) Menopause  Plan: DX Hip/Pelvis/Spine          (D36.9) Adenomatous polyps  Comment: Referred for colonoscopy.   Plan: Colonoscopy Screening  Referral          (Z11.59) Need for hepatitis C screening test  Plan: Hepatitis C Screen Reflex to HCV RNA Quant and         Genotype            Patient has been advised of split billing requirements and indicates understanding: Yes    COUNSELING:  Reviewed preventive health counseling, as reflected in patient  "instructions    Estimated body mass index is 24.42 kg/m  as calculated from the following:    Height as of this encounter: 1.664 m (5' 5.5\").    Weight as of this encounter: 67.6 kg (149 lb).        She reports that she quit smoking about 47 years ago. Her smoking use included cigarettes. She started smoking about 57 years ago. She has a 5.00 pack-year smoking history. She has never used smokeless tobacco.      Appropriate preventive services were discussed with this patient, including applicable screening as appropriate for cardiovascular disease, diabetes, osteopenia/osteoporosis, and glaucoma.  As appropriate for age/gender, discussed screening for colorectal cancer, prostate cancer, breast cancer, and cervical cancer. Checklist reviewing preventive services available has been given to the patient.    Reviewed patients plan of care and provided an AVS. The Basic Care Plan (routine screening as documented in Health Maintenance) for Emma meets the Care Plan requirement. This Care Plan has been established and reviewed with the Patient.    Counseling Resources:  ATP IV Guidelines  Pooled Cohorts Equation Calculator  Breast Cancer Risk Calculator  Breast Cancer: Medication to Reduce Risk  FRAX Risk Assessment  ICSI Preventive Guidelines  Dietary Guidelines for Americans, 2010  [employer] MyPlate  ASA Prophylaxis  Lung CA Screening    Gene Scott MD,   [facility]    Identified Health Risks:  Answers for HPI/ROS submitted by the patient on 10/7/2022  If you checked off any problems, how difficult have these problems made it for you to do your work, take care of things at home, or get along with other people?: Not difficult at all  PHQ9 TOTAL SCORE: 0      Patient Instructions     Blood pressure elevated in the office today. Before adding a medication, let's have you check home BP readings and write them down weekly for about 4-6 weeks, then call our office with results.   If home BP's above " 140/90, recommend adding a medication called Losartan that rarely causes side effects and usually works quite well.     Someone will contact you to help you to schedule a bone density test.  Also someone will contact you to help you to schedule an appointment with a Urology/Gynecology specialist about bladder.      Otherwise everything looks fine!    Refills of medications have been faxed to your pharmacy.     Lab results will be available soon on [employer].    Call us in 4-6 weeks with home blood pressure readings.   See me in a year, sooner if problems.

## 2022-10-07 NOTE — PATIENT INSTRUCTIONS
Patient Education   Personalized Prevention Plan  You are due for the preventive services outlined below.  Your care team is available to assist you in scheduling these services.  If you have already completed any of these items, please share that information with your care team to update in your medical record.  Health Maintenance Due   Topic Date Due    Depression Action Plan  Never done    Hepatitis C Screening  Never done    COVID-19 Vaccine (5 - Booster for Pfizer series) 06/15/2022    Flu Vaccine (1) 09/01/2022       Understanding USDA MyPlate  The USDA has guidelines to help you make healthy food choices. These are called MyPlate. MyPlate shows the food groups that make up healthy meals using the image of a place setting. Before you eat, think about the healthiest choices for what to put on your plate or in your cup or bowl. To learn more about building a healthy plate, visit www.choosemyplate.gov.    The food groups  Fruits. Any fruit or 100% fruit juice counts as part of the Fruit Group. Fruits may be fresh, canned, frozen, or dried, and may be whole, cut-up, or pureed. Make 1/2 of your plate fruits and vegetables.  Vegetables. Any vegetable or 100% vegetable juice counts as a member of the Vegetable Group. Vegetables may be fresh, frozen, canned, or dried. They can be served raw or cooked and may be whole, cut-up, or mashed. Make 1/2 of your plate fruits and vegetables.  Grains. All foods made from grains are part of the Grains Group. These include wheat, rice, oats, cornmeal, and barley. Grains are often used to make foods such as bread, pasta, oatmeal, cereal, tortillas, and grits. Grains should be no more than 1/4 of your plate. At least half of your grains should be whole grains.  Protein. This group includes meat, poultry, seafood, beans and peas, eggs, processed soy products (such as tofu), nuts (including nut butters), and seeds. Make protein choices no more than 1/4 of your plate. Meat and  poultry choices should be lean or low fat.  Dairy. The Dairy Group includes all fluid milk products and foods made from milk that contain calcium, such as yogurt and cheese. (Foods that have little calcium, such as cream, butter, and cream cheese, are not part of this group.) Most dairy choices should be low-fat or fat-free.  Oils. Oils aren't a food group, but they do contain essential nutrients. However it's important to watch your intake of oils. These are fats that are liquid at room temperature. They include canola, corn, olive, soybean, vegetable, and sunflower oil. Foods that are mainly oil include mayonnaise, certain salad dressings, and soft margarines. You likely already get your daily oil allowance from the foods you eat.  Things to limit  Eating healthy also means limiting these things in your diet:     Salt (sodium). Many processed foods have a lot of sodium. To keep sodium intake down, eat fresh vegetables, meats, poultry, and seafood when possible. Purchase low-sodium, reduced-sodium, or no-salt-added food products at the store. And don't add salt to your meals at home. Instead, season them with herbs and spices such as dill, oregano, cumin, and paprika. Or try adding flavor with lemon or lime zest and juice.  Saturated fat. Saturated fats are most often found in animal products such as beef, pork, and chicken. They are often solid at room temperature, such as butter. To reduce your saturated fat intake, choose leaner cuts of meat and poultry. And try healthier cooking methods such as grilling, broiling, roasting, or baking. For a simple lower-fat swap, use plain nonfat yogurt instead of mayonnaise when making potato salad or macaroni salad.  Added sugars. These are sugars added to foods. They are in foods such as ice cream, candy, soda, fruit drinks, sports drinks, energy drinks, cookies, pastries, jams, and syrups. Cut down on added sugars by sharing sweet treats with a family member or friend. You  can also choose fruit for dessert, and drink water or other unsweetened beverages.     [employer] last reviewed this educational content on 6/1/2020 2000-2021 [employer]. All rights reserved. This information is not intended as a substitute for professional medical care. Always follow your healthcare professional's instructions.          Signs of Hearing Loss      Hearing much better with one ear can be a sign of hearing loss.   Hearing loss is a problem shared by many people. In fact, it is one of the most common health problems, particularly as people age. Most people age 65 and older have some hearing loss. By age 80, almost everyone does. Hearing loss often occurs slowly over the years. So you may not realize your hearing has gotten worse.  Have your hearing checked  Call your healthcare provider if you:  Have to strain to hear normal conversation  Have to watch other people s faces very carefully to follow what they re saying  Need to ask people to repeat what they ve said  Often misunderstand what people are saying  Turn the volume of the television or radio up so high that others complain  Feel that people are mumbling when they re talking to you  Find that the effort to hear leaves you feeling tired and irritated  Notice, when using the phone, that you hear better with one ear than the other  [employer] last reviewed this educational content on 1/1/2020 2000-2021 [employer]. All rights reserved. This information is not intended as a substitute for professional medical care. Always follow your healthcare professional's instructions.          Urinary Incontinence, Female (Adult)   Urinary incontinence means loss of bladder control. This problem affects many women, especially as they get older. If you have incontinence, you may be embarrassed to ask for help. But know that this problem can be treated.   Types of Incontinence  There are different types of incontinence. Two of the  main types are described here. You can have more than one type.   Stress incontinence. With this type, urine leaks when pressure (stress) is put on the bladder. This may happen when you cough, sneeze, or laugh. Stress incontinence most often occurs because the pelvic floor muscles that support the bladder and urethra are weak. This can happen after pregnancy and vaginal childbirth or a hysterectomy. It can also be due to excess body weight or hormone changes.  Urge incontinence (also called overactive bladder). With this type, a sudden urge to urinate is felt often. This may happen even though there may not be much urine in the bladder. The need to urinate often during the night is common. Urge incontinence most often occurs because of bladder spasms. This may be due to bladder irritation or infection. Damage to bladder nerves or pelvic muscles, constipation, and certain medicines can also lead to urge incontinence.  Treatment depends on the cause. Further evaluation is needed to find the type you have. This will likely include an exam and certain tests. Based on the results, you and your healthcare provider can then plan treatment. Until a diagnosis is made, the home care tips below can help ease symptoms.   Home care  Do pelvic floor muscle exercises, if they are prescribed. The pelvic floor muscles help support the bladder and urethra. Many women find that their symptoms improve when doing special exercises that strengthen these muscles. To do the exercises, contract the muscles you would use to stop your stream of urine. But do this when you re not urinating. Hold for 10 seconds, then relax. Repeat 10 to 20 times in a row, at least 3 times a day. Your healthcare provider may give you other instructions for how to do the exercises and how often.  Keep a bladder diary. This helps track how often and how much you urinate over a set period of time. Bring this diary with you to your next visit with the provider. The  information can help your provider learn more about your bladder problem.  Lose weight, if advised to by your provider. Extra weight puts pressure on the bladder. Your provider can help you create a weight-loss plan that s right for you. This may include exercising more and making certain diet changes.  Don't have foods and drinks that may irritate the bladder. These can include alcohol and caffeinated drinks.  Quit smoking. Smoking and other tobacco use can lead to a long-term (chronic) cough that strains the pelvic floor muscles. Smoking may also damage the bladder and urethra. Talk with your provider about treatments or methods you can use to quit smoking.  If drinking large amounts of fluid makes you have symptoms, you may be advised to limit your fluid intake. You may also be advised to drink most of your fluids during the day and to limit fluids at night.  If you re worried about urine leakage or accidents, you may wear absorbent pads to catch urine. Change the pads often. This helps reduce discomfort. It may also reduce the risk of skin or bladder infections.    Follow-up care  Follow up with your healthcare provider, or as directed. It may take some to find the right treatment for your problem. But healthy lifestyle changes can be made right away. These include such things as exercising on a regular basis, eating a healthy diet, losing weight (if needed), and quitting smoking. Your treatment plan may include special therapies or medicines. Certain procedures or surgery may also be options. Talk about any questions you have with your provider.   When to seek medical advice  Call the healthcare provider right away if any of these occur:  Fever of 100.4 F (38 C) or higher, or as directed by your provider  Bladder pain or fullness  Belly swelling  Nausea or vomiting  Back pain  Weakness, dizziness, or fainting  [employer] last reviewed this educational content on 1/1/2020 2000-2021 [employer].  All rights reserved. This information is not intended as a substitute for professional medical care. Always follow your healthcare professional's instructions.          Blood pressure elevated in the office today. Before adding a medication, let's have you check home BP readings and write them down weekly for about 4-6 weeks, then call our office with results.   If home BP's above 140/90, recommend adding a medication called Losartan that rarely causes side effects and usually works quite well.     Someone will contact you to help you to schedule a bone density test.  Also someone will contact you to help you to schedule an appointment with a Urology/Gynecology specialist about bladder.      Otherwise everything looks fine!    Refills of medications have been faxed to your pharmacy.     Lab results will be available soon on [employer].    Call us in 4-6 weeks with home blood pressure readings.   See me in a year, sooner if problems.

## 2022-10-07 NOTE — PROGRESS NOTES
"    The patient was counseled and encouraged to consider modifying their diet and eating habits. She was provided with information on recommended healthy diet options.  The patient was provided with written information regarding signs of hearing loss.  Information on urinary incontinence and treatment options given to patient.  Answers for HPI/ROS submitted by the patient on 10/7/2022  If you checked off any problems, how difficult have these problems made it for you to do your work, take care of things at home, or get along with other people?: Not difficult at all  PHQ9 TOTAL SCORE: 0  In general, how would you rate your overall physical health?: good  Frequency of exercise:: 4-5 days/week  Do you usually eat at least 4 servings of fruit and vegetables a day, include whole grains & fiber, and avoid regularly eating high fat or \"junk\" foods? : No  Taking medications regularly:: Yes  Medication side effects:: None  Activities of Daily Living: no assistance needed  Home safety: no safety concerns identified  Hearing Impairment:: difficulty understanding soft or whispered speech  In the past 6 months, have you been bothered by leaking of urine?: Yes  abdominal pain: No  Blood in stool: No  Blood in urine: No  chest pain: No  chills: No  congestion: No  constipation: Yes  cough: No  diarrhea: No  dizziness: No  ear pain: No  eye pain: No  nervous/anxious: No  fever: No  frequency: No  genital sores: No  headaches: No  hearing loss: No  heartburn: No  arthralgias: No  joint swelling: No  peripheral edema: No  mood changes: No  myalgias: No  nausea: No  dysuria: No  palpitations: No  Skin sensation changes: No  sore throat: No  urgency: No  rash: No  shortness of breath: No  visual disturbance: No  weakness: No  pelvic pain: No  vaginal bleeding: No  vaginal discharge: No  tenderness: No  breast mass: No  breast discharge: No  In general, how would you rate your overall mental or emotional health?: good  Additional " concerns today:: No  Duration of exercise:: 45-60 minutes

## 2022-10-08 LAB
ALBUMIN SERPL-MCNC: 3.5 G/DL (ref 3.4–5)
ALP SERPL-CCNC: 90 U/L (ref 40–150)
ALT SERPL W P-5'-P-CCNC: 20 U/L (ref 0–50)
ANION GAP SERPL CALCULATED.3IONS-SCNC: 7 MMOL/L (ref 3–14)
AST SERPL W P-5'-P-CCNC: 23 U/L (ref 0–45)
BILIRUB SERPL-MCNC: 0.5 MG/DL (ref 0.2–1.3)
BUN SERPL-MCNC: 14 MG/DL (ref 7–30)
CALCIUM SERPL-MCNC: 9.1 MG/DL (ref 8.5–10.1)
CHLORIDE BLD-SCNC: 107 MMOL/L (ref 94–109)
CHOLEST SERPL-MCNC: 167 MG/DL
CO2 SERPL-SCNC: 24 MMOL/L (ref 20–32)
CREAT SERPL-MCNC: 0.71 MG/DL (ref 0.52–1.04)
FASTING STATUS PATIENT QL REPORTED: YES
GFR SERPL CREATININE-BSD FRML MDRD: 87 ML/MIN/1.73M2
GLUCOSE BLD-MCNC: 90 MG/DL (ref 70–99)
HDLC SERPL-MCNC: 53 MG/DL
LDLC SERPL CALC-MCNC: 99 MG/DL
NONHDLC SERPL-MCNC: 114 MG/DL
POTASSIUM BLD-SCNC: 4.1 MMOL/L (ref 3.4–5.3)
PROT SERPL-MCNC: 6.7 G/DL (ref 6.8–8.8)
SODIUM SERPL-SCNC: 138 MMOL/L (ref 133–144)
TRIGL SERPL-MCNC: 74 MG/DL
TSH SERPL DL<=0.005 MIU/L-ACNC: 2.49 MU/L (ref 0.4–4)

## 2022-10-10 LAB — HCV AB SERPL QL IA: NONREACTIVE

## 2022-10-21 ENCOUNTER — E-VISIT (OUTPATIENT)
Dept: INTERNAL MEDICINE | Facility: CLINIC | Age: 77
End: 2022-10-21
Payer: COMMERCIAL

## 2022-10-21 DIAGNOSIS — I10 ESSENTIAL HYPERTENSION: Primary | ICD-10-CM

## 2022-10-21 PROCEDURE — 99421 OL DIG E/M SVC 5-10 MIN: CPT | Performed by: INTERNAL MEDICINE

## 2022-10-21 RX ORDER — LOSARTAN POTASSIUM 25 MG/1
TABLET ORAL
Qty: 90 TABLET | Refills: 0 | Status: SHIPPED | OUTPATIENT
Start: 2022-10-21 | End: 2022-12-13 | Stop reason: DRUGHIGH

## 2022-10-23 ENCOUNTER — HEALTH MAINTENANCE LETTER (OUTPATIENT)
Age: 77
End: 2022-10-23

## 2022-11-01 ENCOUNTER — THERAPY VISIT (OUTPATIENT)
Dept: PHYSICAL THERAPY | Facility: CLINIC | Age: 77
End: 2022-11-01
Attending: INTERNAL MEDICINE
Payer: COMMERCIAL

## 2022-11-01 ENCOUNTER — E-VISIT (OUTPATIENT)
Dept: INTERNAL MEDICINE | Facility: CLINIC | Age: 77
End: 2022-11-01
Payer: COMMERCIAL

## 2022-11-01 DIAGNOSIS — G89.29 CHRONIC LEFT-SIDED LOW BACK PAIN WITHOUT SCIATICA: ICD-10-CM

## 2022-11-01 DIAGNOSIS — M54.50 CHRONIC LEFT-SIDED LOW BACK PAIN WITHOUT SCIATICA: ICD-10-CM

## 2022-11-01 DIAGNOSIS — M54.2 NECK PAIN: Primary | ICD-10-CM

## 2022-11-01 PROCEDURE — 97161 PT EVAL LOW COMPLEX 20 MIN: CPT | Mod: GP | Performed by: PHYSICAL THERAPIST

## 2022-11-01 PROCEDURE — 97110 THERAPEUTIC EXERCISES: CPT | Mod: GP | Performed by: PHYSICAL THERAPIST

## 2022-11-01 PROCEDURE — 99207 PR NON-BILLABLE SERV PER CHARTING: CPT | Performed by: INTERNAL MEDICINE

## 2022-11-01 NOTE — PROGRESS NOTES
[facility] Initial Evaluation -- Lumbar    Date: November 1, 2022  Emma Jenkins is a 76 year old female with a chronic low back condition.   Referral: Dr. Scott  Work mechanical stresses:  retired  Employment status:  retired  Leisure mechanical stresses: walking  Functional disability score (JOSH/STarT Back):  See flow sheet  VAS score (0-10): 4/10  Patient goals/expectations:  Wants thorough home program to offset any aging changes    HISTORY:  Answers for HPI/ROS submitted by the patient on 11/1/2022  Reason for Visit:: Neck and back stenosis  When problem began:: 4/15/2018  How problem occurred:: Fall  Number scale: 4/10  General health as reported by patient: good  Please check all that apply to your current or past medical history: rheumatoid arthritis  Medical allergies: none  Surgeries: none  Medications you are currently taking: anti-depressants, anti-inflammatory, bone density, high blood pressure medication, sleep medication  Occupation:: Retired  What are your primary job tasks: computer work, driving, prolonged sitting, prolonged standing    Present symptoms: L low back  Pain quality (sharp/shooting/stabbing/aching/burning/cramping):  aching   Paresthesia (yes/no):  no    Present since (onset date): chronic, seen by Dr Scott 10/7/22  w/referral to PT.     Symptoms (improving/unchanging/worsening):  unchanging.     Symptoms commenced as a result of: fall in 2018   Condition occurred in the following environment:   community     Symptoms at onset (back/thigh/leg): L back  Constant symptoms (back/thigh/leg): L back  Intermittent symptoms (back/thigh/leg):     Symptoms are made worse with the following: Always Bending, Always Standing and Time of day - Always AM and Always as the day progresses   Symptoms are made better with the following: Always On the move    Disturbed sleep (yes/no):  no Sleeping postures (prone/sup/side R/L): varied    Previous episodes (0/1-5/6-10/11+):  1+ Year of first episode: 2018    Previous history: chronic  Previous treatments: PT      Specific Questions:  Cough/Sneeze/Strain (pos/neg): neg  Bowel/Bladder (normal/abnormal): normal  Gait (normal/abnormal): normal  Medications (nil/NSAIDS/analg/steroids/anticoag/other):  Other - anti-depressants, anti-inflammatory, bone density, high blood pressure medication, sleep medication  Medical allergies:  See chart  General health (excellent/good/fair/poor):  good  Pertinent medical history:  High blood pressure and Rheumatoid arthritis  Imaging (None/Xray/MRI/Other):  None recent; previous indicated spondy at multiple levels.  Recent or major surgery (yes/no):  no  Night pain (yes/no): no  Accidents (yes/no): no  Unexplained weight loss (yes/no): none  Barriers at home: none  Other red flags: none    EXAMINATION    Posture:   Sitting (good/fair/poor): fair  Standing (good/fair/poor):fair  Lordosis (red/acc/normal): red  Correction of posture (better/worse/no effect):     Lateral Shift (right/left/nil):   Relevant (yes/no):    Other Observations:     Neurological:    Motor deficit:  intact  Reflexes: n/ax  Sensory deficit:  intact  Dural signs:  n/a    Movement Loss:   Johan Mod Min Nil Pain   Flexion   x  pdm   Extension  x x  erp   Side Gliding R   x     Side Gliding L   x  pdm     Test Movements:   During: produces, abolishes, increases, decreases, no effect, centralizing, peripheralizing   After: better, worse, no better, no worse, no effect, centralized, peripheralized    Pretest symptoms standing:    Symptoms During Symptoms After ROM increased ROM decreased No Effect   FIS        Rep FIS        EIS        Rep EIS          Pretest symptoms lying:     Symptoms During Symptoms After ROM increased ROM decreased No Effect   MACEY        Rep MACEY        EIL        Rep EIL          If required, pretest symptoms:    Symptoms During Symptoms After ROM increased ROM decreased No Effect   SGIS - R        Rep SGIS - R         SGIS - L        Rep SGIS - L          Static Tests:  Sitting slouched:     Sitting erect:    Standing slouched:   Standing erect:    Lying prone in extension:   Long sitting:      Other Tests: TA initiation/tonic holding 2/5; glut med=4-/5, glut max=3+/5, hip ER=4/5, hip IR=4+/5    Provisional Classification:  Inconclusive/Other - Structurally Compromised    Principle of Management:  Education:  Therapeutic dose of exercise   Equipment provided:    Mechanical therapy (Y/N):     Extension principle:    Lateral Principle:    Flexion principle:    Other:  Core/hip/spinal extensor strengthening    ASSESSMENT/PLAN:    Patient is a 76 year old female with lumbar complaints.    Patient has the following significant findings with corresponding treatment plan.                Diagnosis 1:  Chronic L LBP  Pain -  self management, education and home program  Decreased ROM/flexibility - manual therapy and therapeutic exercise  Decreased strength - therapeutic exercise and therapeutic activities  Decreased function - therapeutic activities  Impaired posture - neuro re-education    Therapy Evaluation Codes:   1) History comprised of:   Personal factors that impact the plan of care:      None.    Comorbidity factors that impact the plan of care are:      High blood pressure and Osteoporosis.     Medications impacting care: anti-depressants, anti-inflammatory, bone density, high blood pressure medication, sleep medication.  2) Examination of Body Systems comprised of:   Body structures and functions that impact the plan of care:      Lumbar spine.   Activity limitations that impact the plan of care are:      Bending, Lifting and Standing.  3) Clinical presentation characteristics are:   Evolving/Changing.  4) Decision-Making    Moderate complexity using standardized patient assessment instrument and/or measureable assessment of functional outcome.  Cumulative Therapy Evaluation is: Moderate complexity.    Previous and current  functional limitations:  (See Goal Flow Sheet for this information)    Short term and Long term goals: (See Goal Flow Sheet for this information)     Communication ability:  Patient appears to be able to clearly communicate and understand verbal and written communication and follow directions correctly.  Treatment Explanation - The following has been discussed with the patient:   RX ordered/plan of care  Anticipated outcomes  Possible risks and side effects  This patient would benefit from PT intervention to resume normal activities.   Rehab potential is good.    Frequency:  1 X week, once daily  Duration:  for 6 weeks  Discharge Plan:  Achieve all LTG.  Independent in home treatment program.  Reach maximal therapeutic benefit.    Please refer to the daily flowsheet for treatment today, total treatment time and time spent performing 1:1 timed codes.

## 2022-11-04 NOTE — PROGRESS NOTES
[facility]    OUTPATIENT Physical Therapy ORTHOPEDIC EVALUATION  PLAN OF TREATMENT FOR OUTPATIENT REHABILITATION  (COMPLETE FOR INITIAL CLAIMS ONLY)  Patient's Last Name, First Name, M.I.  YOB: 1945  Emma Jenkins    Provider s Name:  [facility]   Medical Record No.  [id number]   Start of Care Date:  11/03/22   Onset Date:   10/07/22   Treatment Diagnosis:    Medical Diagnosis:  Chronic left-sided low back pain without sciatica       Goals:     11/01/22 1813   Body Part   Goals listed below are for low back   Goal #1   Goal #1 bending   Previous Functional Level No restrictions   Current Functional Level Can bend until hands reach ankle   Performance level 4/10   STG Target Performance Bend until hands reach ankle   Performance level 2/10   Rationale for bathing LE;for dressing LE;for household tasks such as bedmaking, emptying diswasher, washer and dryer, washing floors   Due date 11/22/22   LTG Target Performance Unrestricted bending   Performance Level painfree   Rationale for dressing LE;for bathing LE;for household tasks such as bedmaking, emptying diswasher, washer and dryer, washing floors   Due date 12/13/22       Therapy Frequency:  1 x week  Predicted Duration of Therapy Intervention:  6 weeks    Ramila Herrera, PT                 I CERTIFY THE NEED FOR THESE SERVICES FURNISHED UNDER        THIS PLAN OF TREATMENT AND WHILE UNDER MY CARE     (Physician attestation of this document indicates review and certification of the therapy plan).                     Certification Date From:  11/03/22   Certification Date To:  01/29/23    Referring Provider:  Gene Scott    Initial Assessment        See Epic Evaluation SOC Date: 11/03/22

## 2022-11-08 ENCOUNTER — E-VISIT (OUTPATIENT)
Dept: INTERNAL MEDICINE | Facility: CLINIC | Age: 77
End: 2022-11-08
Payer: COMMERCIAL

## 2022-11-08 DIAGNOSIS — M54.2 CERVICALGIA: Primary | ICD-10-CM

## 2022-11-08 PROCEDURE — 99207 PR NON-BILLABLE SERV PER CHARTING: CPT | Performed by: INTERNAL MEDICINE

## 2022-11-14 ENCOUNTER — HOSPITAL ENCOUNTER (OUTPATIENT)
Dept: MAMMOGRAPHY | Facility: CLINIC | Age: 77
Discharge: HOME | End: 2022-11-14
Attending: INTERNAL MEDICINE | Admitting: INTERNAL MEDICINE
Payer: COMMERCIAL

## 2022-11-14 DIAGNOSIS — Z12.31 VISIT FOR SCREENING MAMMOGRAM: ICD-10-CM

## 2022-11-14 PROCEDURE — 77067 SCR MAMMO BI INCL CAD: CPT

## 2022-11-15 ENCOUNTER — MYC MEDICAL ADVICE (OUTPATIENT)
Dept: INTERNAL MEDICINE | Facility: CLINIC | Age: 77
End: 2022-11-15

## 2022-11-15 ENCOUNTER — TELEPHONE (OUTPATIENT)
Dept: GASTROENTEROLOGY | Facility: CLINIC | Age: 77
End: 2022-11-15

## 2022-11-15 DIAGNOSIS — Z20.822 SUSPECTED 2019 NOVEL CORONAVIRUS INFECTION: Primary | ICD-10-CM

## 2022-11-15 NOTE — TELEPHONE ENCOUNTER
Screening Questions  BLUE  KIND OF PREP RED  LOCATION [review exclusion criteria] GREEN  SEDATION TYPE    Y Are you active on mychart?   Gene Scott MD  Ordering/Referring Provider?    BCBS/MEDICARE  What type of coverage do you have?  N Have you had a positive covid test in the last 90 days?     24.1  1. BMI  [BMI 40+ - review exclusion criteria]    SELF   2. Are you able to give consent for your medical care? [IF NO,RN REVIEW]        N- UNSURE   3. Are you taking any prescription pain medications on a routine schedule?        FOR ARTHRITIS  3a. EXTENDED PREP What kind of prescription?     N 4. Do you have any chemical dependencies such as alcohol, street drugs, or methadone?    N 5. Do you have any history of post-traumatic stress syndrome, severe anxiety or history of psychosis?      **If yes 3- 5 , please schedule with [facility] sedation.**          IF YES TO ANY 6 - 10 - HOSPITAL SETTING ONLY.     N 6.   Do you need assistance transferring?     N 7.   Have you had a heart or lung transplant?    N 8.   Are you currently on dialysis?   N 9.   Do you use daily home oxygen?   N 10. Do you take nitroglycerin?   10a. N If yes, how often?     11. [FEMALES]   Are you currently pregnant?     11a.  If yes, how many weeks? [ Greater than 12 weeks, OR NEEDED]    N 12. Do you have Pulmonary Hypertension?             *NEED PAC APPT AT [facility]*     N 13. [review exclusion criteria]  Do you have any implantable devices in your body (pacemaker, defib, LVAD)?    N 14. In the past 6 months, have you had any heart related issues including cardiomyopathy or heart attack?     N 14a. If yes, did it require cardiac stenting if so when?     N 15. Have you had a stroke or Transient ischemic attack (TIA - aka  mini stroke ) within 6 months?      N 16. Do you have mod to severe Obstructive Sleep Apnea?  [Hospital only - Ok at [facility]]    N 17. Do you have SEVERE AND UNCONTROLLED asthma?              *NEED PAC APPT AT [facility]*     N 18. Are  "you currently taking any blood thinners?     18a. If yes, inform patient to \"follow up w/ ordering provider for bridging instructions.\"    N 19. Do you take the medication Phentermine?    19a. If yes, \"Hold for 7 days before procedure.  Please consult your prescribing provider if you have questions about holding this medication.\"     N  20. Do you have chronic kidney disease?      N  21. Do you have a diagnosis of diabetes?     Y - CONSTIPATED   22. On a regular basis do you go 3-5 days between bowel movements?     23. Preferred LOCAL Pharmacy for Pre Prescription    [ LIST ONLY ONE PHARMACY]        [facility] [id number] [facility] - [place], [place] - [address] [place] [address] [address]          - CLOSING REMINDERS -    Informed patient they will need an adult    Cannot take any type of public or medical transportation alone    Conscious Sedation- Needs  for 6 hours after the procedure       MAC/General-Needs  for 24 hours after procedure    [facility]-Procedure [facility] test [facility] be [facility]    Confirmed Nurse will call to complete assessment       - SCHEDULING DETAILS -        JOSE   Surgeon   01/12/2023  Date of Procedure    Location  [facility]  Sedation Type     Y - SCHED WITH PCP   PAC / Pre-op Required       Type of Procedure Scheduled  Lower Endoscopy [Colonoscopy]      Which Colonoscopy Prep was Sent?     EXT PREP - CONSTIPATED       Additional comments:  N       "

## 2022-11-29 ENCOUNTER — E-VISIT (OUTPATIENT)
Dept: INTERNAL MEDICINE | Facility: CLINIC | Age: 77
End: 2022-11-29
Payer: COMMERCIAL

## 2022-11-29 DIAGNOSIS — I10 BENIGN ESSENTIAL HYPERTENSION: Primary | ICD-10-CM

## 2022-11-29 PROCEDURE — 99207 PR NON-BILLABLE SERV PER CHARTING: CPT | Performed by: INTERNAL MEDICINE

## 2022-12-06 ENCOUNTER — THERAPY VISIT (OUTPATIENT)
Dept: PHYSICAL THERAPY | Facility: CLINIC | Age: 77
End: 2022-12-06
Attending: INTERNAL MEDICINE
Payer: COMMERCIAL

## 2022-12-06 DIAGNOSIS — M54.2 CERVICALGIA: Primary | ICD-10-CM

## 2022-12-06 PROCEDURE — 97162 PT EVAL MOD COMPLEX 30 MIN: CPT | Mod: GP | Performed by: PHYSICAL THERAPIST

## 2022-12-06 PROCEDURE — 97110 THERAPEUTIC EXERCISES: CPT | Mod: GP | Performed by: PHYSICAL THERAPIST

## 2022-12-06 PROCEDURE — 97112 NEUROMUSCULAR REEDUCATION: CPT | Mod: GP | Performed by: PHYSICAL THERAPIST

## 2022-12-09 NOTE — PROGRESS NOTES
[facility]    OUTPATIENT Physical Therapy ORTHOPEDIC EVALUATION  PLAN OF TREATMENT FOR OUTPATIENT REHABILITATION  (COMPLETE FOR INITIAL CLAIMS ONLY)  Patient's Last Name, First Name, M.I.  YOB: 1945  Emma Jenkins    Provider s Name:  [facility]   Medical Record No.  [id number]   Start of Care Date:      Onset Date:       Treatment Diagnosis:    Medical Diagnosis:  Data Unavailable       Goals:     12/06/22 1111   Body Part   Goals listed below are for cervical   Goal #2   Goal #2 driving/transportation   Previous Functional Level No restrictions   Current Functional Level Unable to rotate neck to look over shoulders;Drives using side and rearview mirrors   Performance Level B Rot=mod loss   STG Target Performance Able to look over either shoulder ;Drive using side and rearview mirrors   Performance Level min to mod loss B rot   Rationale for safe driving   Due date 12/27/22   LTG Target Performance Able to look over either shoulder   Performance Level min loss B Rot   Rationale for safe driving   Due date 01/17/23       Therapy Frequency:     Predicted Duration of Therapy Intervention:       Ramila Herrera, PT                 I CERTIFY THE NEED FOR THESE SERVICES FURNISHED UNDER        THIS PLAN OF TREATMENT AND WHILE UNDER MY CARE     (Physician attestation of this document indicates review and certification of the therapy plan).                     Certification Date From:      Certification Date To:       Referring Provider:  Gene Scott    Initial Assessment        See Epic Evaluation

## 2022-12-09 NOTE — PROGRESS NOTES
[facility] Initial Evaluation -- Cervical     Evaluation Date: December 6, 2022  Emma Jenkins is a 77 year old female with a cervical condition.   Referral: Dr. Scott  Work mechanical stresses: retired   Employment status: retired  Leisure mechanical stresses: home keeping  Functional disability score (NDI):  See flow shee  VAS score (0-10): 4/10  Patient goals/expectations:  Pain relief     HISTORY:     Present symptoms:  Central lower, upper, upper thoracic.  Pain quality (sharp/shooting/stabbing/aching/burning/cramping):  aching .  Paresthesia (yes/no):  no     Present since (onset date): took a fall in [place] x 5 years ago, hitting chin; referred to PT October 2022               Symptoms (improving/unchanging/worsening):  unchanging     Symptoms commenced as a result of: fall   Condition occurred in the following environment:  community     Symptoms at onset (neck/arm/forearm/headache): neck pain  Constant symptoms (neck/arm/forearm/headache): neck pain  Intermittent symptoms (neck/arm/forearm/headache):      Symptoms are made worse with the following: Always Bending, Always Turning, time of day - No effect and Always When still   Symptoms are made better with the following: Always Lying and Advil     Disturbed sleep (yes/no): no                        Number of pillows: 1  Sleeping postures (prone/sup/side R/L): sides/back     Previous episodes (0/1-5/6-10/11+): none prior to fall 5 years ago            Year of first episode: 2017     Previous history: none previous  Previous treatments: one PT episode     Specific Questions: (as reported by the patient)  Dizziness/Tinnitus/Nausea/Swallowing (pos/neg): neg  Gait/Upper Limbs (normal/abnormal): normal  Medications (nil/NSAIDS/anlag/steroids/anticoag/other):  Other - Bone densisty, High blood pressure and Enbrel, anti depressant  Medical allergies:  See chart  General health (excellent/good/fair/poor):  good  Pertinent medical history:   Depression, High blood pressure and Rheumatoid arthritis  Imaging (None/Xray/MRI/Other):  None recent  Recent or major surgery (yes/no): no  Night pain (yes/no): no  Accidents (yes/no): no  Unexplained weight loss (yes/no): none  Barriers at home: none  Other red flags: none     EXAMINATION     Posture:   Sitting (good/fair/poor): fair              Standing (good/fair/poor): fair2      Protruded head (yes/no): yes            Wry Neck (right/left/nil):  Nil             Relevant (yes/no):  no      Correction of posture(better/worse/no effect): better  Other observations:  incr thoracic kyphosis     Neurological:     Motor Deficit:  n/a                              Reflexes:    Sensory Deficit:                                 Dural signs:       Movement Loss:    Johan Mod Min Nil Pain   Protrusion       x     Flexion     x       Retraction   x         Extension   x     pdm   Lateral flexion R   x     erp   Lateral flexion L   x x   erp   Rotation R   x x   erp   Rotation L   x x   erp      Test Movements:   During: produces, abolishes, increases, decreases, no effect, centralizing, peripheralizing  After: better, worse, no better, no worse, no effect, centralized, peripheralized     Pretest symptoms sitting: bilateral neck    Symptoms During Symptoms After ROM increased ROM decreased No Effect   PRO             Rep PRO             RET Increases    No Worse            Rep RET Increases No Worse    x       RET EXT             Rep RET EXT                Pretest symptoms lying:      Symptoms During Symptoms After ROM increased ROM decreased No Effect   RET             Rep RET             RET EXT             Rep RET EXT                If required, pretest symptoms sitting:       Symptoms During Symptoms After ROM increased ROM decreased No Effect   LF-R             Rep LF-R             LF-L             Rep LF-L             ROT-R             Rep ROT-R             ROT-L             Rep ROT-L             FLEX             Rep  FLEX                   Static Tests:   Protrusion:                 Flexion:    Retraction:                 Extension (sitting/prone/supine):       Other Tests:      Provisional Classification:  Derangement - Bilateral, symmetrical, symptoms above elbow     Principle of Management:  Education:  Posture, traffic light, therapeutic dose of exercise                                            Equipment provided:    Mechanical therapy (Y/N):  Y             Extension principle:  Rep RET + pt. Op x 10/4 x day                                 Lateral principle:    Flexion principle:                              Other:  Mid back mobility, strengthening     ASSESSMENT/PLAN:     Patient is a 77 year old female with cervical complaints.    Patient has the following significant findings with corresponding treatment plan.                Diagnosis 1:  cervicalgia  Pain -  manual therapy, self management, education, directional preference exercise and home program  Decreased ROM/flexibility - manual therapy and therapeutic exercise  Decreased joint mobility - manual therapy and therapeutic exercise  Decreased strength - therapeutic exercise and therapeutic activities  Decreased function - therapeutic activities  Impaired posture - neuro re-education     Therapy Evaluation Codes:   1. History comprised of:              Personal factors that impact the plan of care:                            None.               Comorbidity factors that impact the plan of care are:                            Depression, High blood pressure and Rheumatoid arthritis.                Medications impacting care: Bone densisty, High blood pressure and Enbrel, anti depressant.  2. Examination of Body Systems comprised of:              Body structures and functions that impact the plan of care:                            Cervical spine.              Activity limitations that impact the plan of care are:                            Bending, Driving,  Reading/Computer work and Sleeping.  3. Clinical presentation characteristics are:              Evolving/Changing.  4. Decision-Making                          Moderate complexity using standardized patient assessment instrument and/or measureable assessment of functional outcome.  Cumulative Therapy Evaluation is: Moderate complexity.     Previous and current functional limitations:  (See Goal Flow Sheet for this information)    Short term and Long term goals: (See Goal Flow Sheet for this information)      Communication ability:  Patient appears to be able to clearly communicate and understand verbal and written communication and follow directions correctly.  Treatment Explanation - The following has been discussed with the patient:   RX ordered/plan of care  Anticipated outcomes  Possible risks and side effects  This patient would benefit from PT intervention to resume normal activities.   Rehab potential is good.     Frequency:  1 X week, once daily  Duration:  for 6 weeks  Discharge Plan:  Achieve all LTG.  Independent in home treatment program.  Reach maximal therapeutic benefit.     Please refer to the daily flowsheet for treatment today, total treatment time and time spent performing 1:1 timed codes.

## 2022-12-12 DIAGNOSIS — I10 ESSENTIAL HYPERTENSION: ICD-10-CM

## 2022-12-14 RX ORDER — LOSARTAN POTASSIUM 25 MG/1
TABLET ORAL
Qty: 90 TABLET | Refills: 0 | OUTPATIENT
Start: 2022-12-14

## 2023-01-03 ENCOUNTER — OFFICE VISIT (OUTPATIENT)
Dept: INTERNAL MEDICINE | Facility: CLINIC | Age: 78
End: 2023-01-03
Payer: COMMERCIAL

## 2023-01-03 ENCOUNTER — LAB (OUTPATIENT)
Dept: LAB | Facility: CLINIC | Age: 78
End: 2023-01-03
Payer: COMMERCIAL

## 2023-01-03 ENCOUNTER — OFFICE VISIT (OUTPATIENT)
Dept: UROLOGY | Facility: CLINIC | Age: 78
End: 2023-01-03
Attending: INTERNAL MEDICINE
Payer: COMMERCIAL

## 2023-01-03 VITALS
SYSTOLIC BLOOD PRESSURE: 156 MMHG | HEIGHT: 64 IN | DIASTOLIC BLOOD PRESSURE: 80 MMHG | BODY MASS INDEX: 25.44 KG/M2 | WEIGHT: 149 LBS

## 2023-01-03 VITALS
WEIGHT: 149 LBS | HEIGHT: 64 IN | BODY MASS INDEX: 25.44 KG/M2 | HEART RATE: 76 BPM | SYSTOLIC BLOOD PRESSURE: 138 MMHG | RESPIRATION RATE: 16 BRPM | TEMPERATURE: 97.8 F | DIASTOLIC BLOOD PRESSURE: 86 MMHG | OXYGEN SATURATION: 98 %

## 2023-01-03 DIAGNOSIS — N89.8 VAGINAL ITCHING: ICD-10-CM

## 2023-01-03 DIAGNOSIS — N95.2 ATROPHIC VAGINITIS: ICD-10-CM

## 2023-01-03 DIAGNOSIS — Z01.818 PRE-OP EXAMINATION: Primary | ICD-10-CM

## 2023-01-03 DIAGNOSIS — K59.04 CHRONIC IDIOPATHIC CONSTIPATION: ICD-10-CM

## 2023-01-03 DIAGNOSIS — N39.46 MIXED STRESS AND URGE URINARY INCONTINENCE: Primary | ICD-10-CM

## 2023-01-03 DIAGNOSIS — F33.42 MAJOR DEPRESSIVE DISORDER, RECURRENT EPISODE, IN FULL REMISSION (H): ICD-10-CM

## 2023-01-03 DIAGNOSIS — L29.0 RECTAL ITCHING: ICD-10-CM

## 2023-01-03 DIAGNOSIS — D36.9 ADENOMATOUS POLYPS: ICD-10-CM

## 2023-01-03 DIAGNOSIS — M35.00 SICCA SYNDROME (H): ICD-10-CM

## 2023-01-03 LAB
ALBUMIN UR-MCNC: NEGATIVE MG/DL
APPEARANCE UR: CLEAR
BILIRUB UR QL STRIP: NEGATIVE
CLUE CELLS: ABNORMAL
COLOR UR AUTO: YELLOW
GLUCOSE UR STRIP-MCNC: NEGATIVE MG/DL
HGB UR QL STRIP: NEGATIVE
KETONES UR STRIP-MCNC: NEGATIVE MG/DL
LEUKOCYTE ESTERASE UR QL STRIP: NEGATIVE
NITRATE UR QL: NEGATIVE
PH UR STRIP: 5.5 [PH] (ref 5–7)
RESIDUAL VOLUME (RV) (EXTERNAL): 20
SP GR UR STRIP: 1.01 (ref 1–1.03)
TRICHOMONAS, WET PREP: ABNORMAL
UROBILINOGEN UR STRIP-ACNC: 0.2 E.U./DL
WBC'S/HIGH POWER FIELD, WET PREP: ABNORMAL
YEAST, WET PREP: ABNORMAL

## 2023-01-03 PROCEDURE — 81003 URINALYSIS AUTO W/O SCOPE: CPT | Mod: QW | Performed by: PHYSICIAN ASSISTANT

## 2023-01-03 PROCEDURE — 93000 ELECTROCARDIOGRAM COMPLETE: CPT | Performed by: INTERNAL MEDICINE

## 2023-01-03 PROCEDURE — 51798 US URINE CAPACITY MEASURE: CPT | Performed by: PHYSICIAN ASSISTANT

## 2023-01-03 PROCEDURE — 99214 OFFICE O/P EST MOD 30 MIN: CPT | Performed by: INTERNAL MEDICINE

## 2023-01-03 PROCEDURE — 99203 OFFICE O/P NEW LOW 30 MIN: CPT | Mod: 25 | Performed by: PHYSICIAN ASSISTANT

## 2023-01-03 PROCEDURE — 87210 SMEAR WET MOUNT SALINE/INK: CPT

## 2023-01-03 RX ORDER — ESTRADIOL 0.1 MG/G
CREAM VAGINAL
Qty: 42.5 G | Refills: 3 | Status: SHIPPED | OUTPATIENT
Start: 2023-01-03 | End: 2023-11-28

## 2023-01-03 RX ORDER — BISACODYL 5 MG
TABLET, DELAYED RELEASE (ENTERIC COATED) ORAL
Qty: 4 TABLET | Refills: 0 | Status: SHIPPED | OUTPATIENT
Start: 2023-01-03 | End: 2023-11-28

## 2023-01-03 ASSESSMENT — PAIN SCALES - GENERAL: PAINLEVEL: NO PAIN (0)

## 2023-01-03 NOTE — PROGRESS NOTES
Subjective      REQUESTING PROVIDER   Gene Scott     REASON FOR CONSULT   Urinary incontinence    HISTORY OF PRESENT ILLNESS   Ms. Jenkins is a very pleasant 77-year-old female, who presents today for further evaluation recommendations regarding urinary incontinence.  She notes that this has been going on for a while.  In reviewing her chart, it was noted in 2019.  She does wear Depends when walking due to fecal urgency more so than urinary symptoms.  She endorses urinary urgency with urge incontinence, particularly bad in the overnight hours.  She notes occasional stress incontinence.  She has nocturia 1-2 times, and she does restrict fluids prior to bedtime.  She endorses some slight dysuria.  Denies hematuria or urinary frequency.    Patient does not regularly wear pads.  She has had issues with longstanding constipation.  She has also had longstanding issues with spinal stenosis, which is relatively controlled at this time.    Urinalysis is not convincing for infection.  Postvoid residual today is 20 mL.    Fluid intake typically includes 2 cups of coffee, 1 to 2 cups of water, and a glass of wine.  She does note that wine will worsen her urinary urgency and urge incontinence.    Distant history urinary tract infections.  She does note that she has some rectal and vaginal irritation.  She has not tried anything for her urinary incontinence.    It appears that she had a hysterectomy and a bladder surgery at 1 point.    The following portions of the patient's history were reviewed and updated as appropriate: allergies, current medications, past family history, past medical history, past social history, past surgical history and problem list.     REVIEW OF SYSTEMS   Review of Systems   Constitutional: Negative for chills and fever.   Respiratory: Negative for shortness of breath.    Cardiovascular: Negative for chest pain.   Gastrointestinal: Positive for constipation and diarrhea (More fecal urgency). Negative  "for nausea and vomiting.   Genitourinary: Positive for dysuria and urgency. Negative for difficulty urinating, frequency and hematuria.   Musculoskeletal: Positive for back pain (Stenosis and does exercises).      Per HPI.     Patient Active Problem List   Diagnosis     Seropositive rheumatoid arthritis of multiple sites (H)     Major depressive disorder, recurrent episode, in full remission (H)     Cervicalgia     Anxiety     Gastroenteritis     Sicca syndrome (H)      Past Medical History:   Diagnosis Date     Anxiety and depression      Arrhythmia      Arthritis     rheumatoid arthritis     Depressive disorder      GERD (gastroesophageal reflux disease)      Heart arrhythmias      Irregular heart beat     \"fast heart beat when sleeping\"     Mumps      Palpitations       Past Surgical History:   Procedure Laterality Date     APPENDECTOMY       BLADDER SURGERY       BLEPHAROPLASTY BILATERAL Bilateral 01/09/2017    Procedure: BLEPHAROPLASTY BILATERAL;  Surgeon: Ismael Holt MD;  Location:  [facility]     COLONOSCOPY  06/04/2013    Adenomatous polyps, repeat in 5 years     COLONOSCOPY N/A 11/09/2018    3 polyps. Procedure: colonoscopy with polypectomy;  Surgeon: Nallely Alaniz MD;  Location: [facility]     GYN SURGERY      hysterectomy     HYSTERECTOMY, PAP NO LONGER INDICATED       ORTHOPEDIC SURGERY       REMOVE HARDWARE HAND  10/07/2011    Procedure:REMOVE HARDWARE HAND; Right Index Finger K-Wire Removal ; Surgeon:KELSEY ALAS; [facility]      Social History:   .     Objective      PHYSICAL EXAM   BP (!) 156/80   Ht 1.626 m (5' 4\")   Wt 67.6 kg (149 lb)   LMP  (LMP Unknown)   BMI 25.58 kg/m     Physical Exam  Constitutional:       Appearance: Normal appearance.   HENT:      Head: Normocephalic.      Nose: Nose normal.   Eyes:      General: No scleral icterus.  Pulmonary:      Effort: Pulmonary effort is normal.   Abdominal:      General: There is no distension.   Genitourinary:     " Comments: Normal intact perineal sensation bilaterally.  Hypermobility of the urethra appreciated, but no stress incontinence elicited with cough or Valsalva.  Left-sided internal labial erythema.  Otherwise remainder of the internal and external labia appear normal.  Slight vaginal discharge.  Grade 1 anterior prolapse, grade 1 apical prolapse, and no rectal prolapse.  Evidence of vaginal atrophy.  No significant evidence of pelvic floor tension myalgia.  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    External hemorrhoid with slight thrombosis.  Musculoskeletal:         General: Normal range of motion.      Cervical back: Normal range of motion.   Skin:     General: Skin is warm and dry.   Neurological:      General: No focal deficit present.      Mental Status: She is alert and oriented to person, place, and time.   Psychiatric:         Mood and Affect: Mood normal.         Behavior: Behavior normal.        LABORATORY   Recent Labs   Lab Test 01/03/23  1348 07/30/19  0920 03/28/19  1521   COLOR Yellow   < > Yellow   APPEARANCE Clear   < > Clear   URINEGLC Negative   < > Negative   URINEBILI Negative   < > Negative   URINEKETONE Negative   < > Negative   SG 1.010   < > 1.010   UBLD Negative   < > Negative   URINEPH 5.5   < > 6.0   PROTEIN Negative   < > Negative   UROBILINOGEN 0.2   < >  --    NITRITE Negative   < > Negative   LEUKEST Negative   < > Negative   RBCU  --   --  1   WBCU  --   --  2    < > = values in this interval not displayed.     TESTING    PVR: 20 mL    Assessment & Plan    1. Mixed stress and urge urinary incontinence    2. Chronic idiopathic constipation    3. Rectal itching    4. Vaginal itching    5. Atrophic vaginitis        I had the pleasure today of meeting with Ms. Jenkins to discuss her urinary incontinence, vaginal itching, and vaginal atrophy.  We discussed that her urinary incontinence has a small stress incontinence component but appears to be much more urge incontinence.  Given the presence  of both, we would consider this mixed urinary incontinence, but she does have urge predominant.  Portion also has longstanding underlying constipation.  We discussed the relationship between bowel and bladder and her constipation can worsen urinary urgency, frequency, emptying, and urge incontinence.  She also has evidence of vaginal discharge as well as some erythema in the vaginal labia area.    It is likely that her rectal itching is secondary to the hemorrhoid that was noted.    We discussed that we typically would treat urge incontinence first in a mixed incontinence picture of urge and stress incontinence. Typical treatments for urge incontinence include avoiding bladder irritants, biofeedback bladder retraining, overactive bladder medications such as oxybutynin, posterior tibial nerve stimulation (PTNS), Botox (which would require learning to perform clean intermittent catheterization and place it works too well), and InterStim. We typically go through this in a stepwise fashion.     We also briefly discussed stress incontinence treatment options including Kegel exercises/biofeedback bladder retraining/pelvic floor PT, E-stim pessary, Poise Impressa, sling, and bulking agents.      In discussion with the patient, she would like to avoid that if patient if possible.  We also discussed that she has already identified a bladder irritant for herself in the form of wine.    We will plan on the following:    -Patient was provided with a list of bladder irritants to use as a guideline.    -Referral to pelvic floor physical therapy.    -We will plan on wet prep to look for any evidence of yeast or bacterial vaginosis infection.    -For hemorrhoid and urinary symptoms, discussed working on softening stool.  We also discussed usage of over-the-counter tools such as Tucks or Preparation H.  We also discussed trying to use MiraLAX regularly for a soft well-formed bowel movement once daily.    -For vaginal erythema and  vaginal atrophy, would recommend starting topical estrogen cream.    Patient will plan on letting me know how she is doing.  Would consider trial of an overactive bladder medication in the future.  Would need to be mindful about possible side effects of constipation with the anticholinergics.    Signed by:     Vikki Mckeon PA-C 1/3/2023 2:13 PM

## 2023-01-03 NOTE — H&P (VIEW-ONLY)
[place] [address]  [address]  [facility] [address]  Phone: [phone]  Primary Provider: Julee Sctot  Pre-op Performing Provider: JULEE SCOTT      PREOPERATIVE EVALUATION:  Today's date: 1/3/2023    Emma Jenkins is a 77 year old female who presents for a preoperative evaluation.    Surgical Information:  Surgery/Procedure: colonoscopy  Surgery Location: [facility]  Surgeon: Jonatan  Surgery Date: 1/12/2023  Time of Surgery: 0630  Where patient plans to recover: At home with family  Fax number for surgical facility: Note does not need to be faxed, will be available electronically in Epic.    Type of Anesthesia Anticipated: moderate sedation    Assessment & Plan     The proposed surgical procedure is considered INTERMEDIATE risk.  (Z01.818) Pre-op examination  (primary encounter diagnosis)  Comment:  Satisfactory operative candidate with anesthesia as required.   Plan: EKG 12-lead complete w/read - Clinics          (D36.9) Adenomatous polyps  Comment: Reason for colonoscopy.     (F33.42) Major depressive disorder, recurrent episode, in full remission (H)  Comment: Remains in remission.     (M35.00) Sicca syndrome (H)  Comment: Relatively inactive problem.       RECOMMENDATION:  APPROVAL GIVEN to proceed with proposed procedure, without further diagnostic evaluation.    Patient Instructions   Everything looks fine to go ahead with surgery as planned.   Take losartan and metoprolol with sips of water on the morning of surgery.       Two phone number options for information about the colonoscopy prep:  Colonoscopy : [phone]  Colorectal surgeon (Shirlene Cheung) office: [phone]    See me in October 2023 for next Annual Wellness Visit.            Subjective     HPI related to upcoming procedure: No recent acute illness symptoms.         Preop Questions 1/2/2023   1. Have you ever had a heart attack or stroke? No   2. Have  you ever had surgery on your heart or blood vessels, such as a stent placement, a coronary artery bypass, or surgery on an artery in your head, neck, heart, or legs? No   3. Do you have chest pain with activity? No   4. Do you have a history of  heart failure? No   5. Do you currently have a cold, bronchitis or symptoms of other infection? No   6. Do you have a cough, shortness of breath, or wheezing? No   7. Do you or anyone in your family have previous history of blood clots? No   8. Do you or does anyone in your family have a serious bleeding problem such as prolonged bleeding following surgeries or cuts? No   9. Have you ever had problems with anemia or been told to take iron pills? YES -    10. Have you had any abnormal blood loss such as black, tarry or bloody stools, or abnormal vaginal bleeding? No   11. Have you ever had a blood transfusion? No   12. Are you willing to have a blood transfusion if it is medically needed before, during, or after your surgery? Yes   13. Have you or any of your relatives ever had problems with anesthesia? No   14. Do you have sleep apnea, excessive snoring or daytime drowsiness? No   15. Do you have any artifical heart valves or other implanted medical devices like a pacemaker, defibrillator, or continuous glucose monitor? No   16. Do you have artificial joints? No   17. Are you allergic to latex? No   18. Is there any chance that you may be pregnant? -       Health Care Directive:  Patient does not have a Health Care Directive or Living Will: Discussed advance care planning with patient; information given to patient to review.    Preoperative Review of :   reviewed - no record of controlled substances prescribed.    Past medical, family and social histories as well as medications reviewed and updated as needed.    Review of Systems  REVIEW OF SYSTEMS: The following systems have been completely reviewed and are negative except as noted above:   Constitutional, HEENT,  "respiratory, cardiovascular, gastrointestinal, genitourinary, hematologic, psychiatric, and neurologic systems.      Patient Active Problem List    Diagnosis Date Noted     Sicca syndrome (H) 11/27/2020     Priority: Medium     Gastroenteritis 03/28/2019     Priority: Medium     Anxiety 10/03/2018     Priority: Medium     Cervicalgia 09/10/2018     Priority: Medium     Major depressive disorder, recurrent episode, in full remission (H) 07/25/2018     Priority: Medium     Seropositive rheumatoid arthritis of multiple sites (H) 10/17/2016     Priority: Medium      Past Medical History:   Diagnosis Date     Anxiety and depression      Arrhythmia      Arthritis     rheumatoid arthritis     Depressive disorder      GERD (gastroesophageal reflux disease)      Heart arrhythmias      Irregular heart beat     \"fast heart beat when sleeping\"     Past Surgical History:   Procedure Laterality Date     APPENDECTOMY       BLEPHAROPLASTY BILATERAL Bilateral 1/9/2017    Procedure: BLEPHAROPLASTY BILATERAL;  Surgeon: Ismael Holt MD;  Location:  [facility]     COLONOSCOPY  06/04/2013    Adenomatous polyps, repeat in 5 years     COLONOSCOPY N/A 11/9/2018    3 polyps. Procedure: colonoscopy with polypectomy;  Surgeon: Nallely Alaniz MD;  Location: [facility]     GYN SURGERY      hysterectomy     HYSTERECTOMY, PAP NO LONGER INDICATED       ORTHOPEDIC SURGERY       REMOVE HARDWARE HAND  10/7/2011    Procedure:REMOVE HARDWARE HAND; Right Index Finger K-Wire Removal ; Surgeon:KELSEY ALAS; [facility]     Current Outpatient Medications   Medication Sig Dispense Refill     alendronate (FOSAMAX) 70 MG tablet TAKE 1 TABLET (70 MG) BY MOUTH WITH 8OZ WATER EVERY 7 DAYS 30 MINUTES BEFORE BREAKFAST AND REMAIN UPRIGHT DURING THIS TIME. 13 tablet 3     citalopram (CELEXA) 20 MG tablet Take 1 tablet (20 mg) by mouth daily 90 tablet 3     etanercept (ENBREL) 50 MG/ML injection Inject 50 mg Subcutaneous once a week On Saturdays   "     losartan (COZAAR) 50 MG tablet Take 1 tablet (50 mg) by mouth daily 14 tablet 0     meclizine (ANTIVERT) 25 MG tablet Take 1 tablet (25 mg) by mouth 3 times daily as needed for dizziness 30 tablet 4     metoprolol tartrate (LOPRESSOR) 50 MG tablet TAKE ONE-HALF TABLET BY MOUTH EVERY MORNING AND TAKE ONE TABLET BY MOUTH EVERY NIGHT AT BEDTIME 135 tablet 3     omeprazole (PRILOSEC) 40 MG DR capsule TAKE 1 CAPSULE BY MOUTH EVERY DAY, TAKE 30-60 MINUTES BEFORE EATING 90 capsule 3     polyethylene glycol (MIRALAX/GLYCOLAX) Packet Take 1 packet by mouth daily       pravastatin (PRAVACHOL) 10 MG tablet Take 1 tablet (10 mg) by mouth daily 90 tablet 3     traZODone (DESYREL) 50 MG tablet TAKE ONE TABLET BY MOUTH AT BEDTIME 90 tablet 3     acyclovir (ZOVIRAX) 400 MG tablet Take 1 tablet (400 mg) by mouth 3 times daily (Patient not taking: Reported on 1/3/2023) 15 tablet 3     aspirin 81 MG tablet Take 1 tablet (81 mg) by mouth daily (Patient not taking: Reported on 10/7/2022) 30 tablet 0     calcium carb 1250 mg, 500 mg [employer],/vitamin D 200 units (OSCAL WITH D) 500-200 MG-UNIT per tablet Take 1 tablet by mouth every morning  (Patient not taking: Reported on 10/7/2022)       Cholecalciferol (VITAMIN D3 PO) Take 2,500 Units by mouth daily  (Patient not taking: Reported on 10/7/2022)       multivitamin, therapeutic (THERA-VIT) TABS Take 1 tablet by mouth daily (Patient not taking: Reported on 10/7/2022)       Omega-3 Fatty Acids (OMEGA-3 FISH OIL PO) Take 1 g by mouth daily  (Patient not taking: Reported on 10/7/2022)         No Known Allergies     Social History     Tobacco Use     Smoking status: Former     Packs/day: 0.50     Years: 10.00     Pack years: 5.00     Types: Cigarettes     Start date: 1965     Quit date: 1975     Years since quittin.7     Smokeless tobacco: Never     Tobacco comments:     Would smoke in social settings   Substance Use Topics     Alcohol use: Yes     Alcohol/week: 4.0 standard  "drinks     Types: 4 Glasses of wine per week     Comment: Approximately 4 glasses per week     Family History   Problem Relation Age of Onset     Colon Cancer Mother 79         from colon cancer     Depression Father          age 94     Anxiety Disorder Father      Depression Sister         Born 1941     Anxiety Disorder Sister      Colon Cancer Brother 71        Born 1940. Had colon CA, cancer-free for 5+ years. B     Anxiety Disorder Brother         Born 1950, back issues     History   Drug Use No         Objective     BP (!) 142/82 (BP Location: Left arm, Patient Position: Sitting, Cuff Size: Adult Regular)   Pulse 76   Temp 97.8  F (36.6  C) (Tympanic)   Resp 16   Ht 1.63 m (5' 4.17\")   Wt 67.6 kg (149 lb)   LMP  (LMP Unknown)   SpO2 98%   Breastfeeding No   BMI 25.44 kg/m      Physical Exam  GENERAL APPEARANCE: healthy, alert and no distress  EYES: Eyes grossly normal to inspection, PERRL and conjunctivae and sclerae normal  HENT: ear canals and TM's normal and nose and mouth without ulcers or lesions  RESP: lungs clear to auscultation - no rales, rhonchi or wheezes  CV: regular rate and rhythm, normal S1 S2, no S3 or S4 and no murmur, click or rub   ABDOMEN: soft, nontender, no HSM or masses and bowel sounds normal  NEURO: Normal strength and tone, sensory exam grossly normal, mentation intact and speech normal    Recent Labs   Lab Test 10/07/22  1050 12/10/21  1051   HGB 12.6 12.1    222    130*   POTASSIUM 4.1 4.2   CR 0.71 0.69        Diagnostics:  No labs were ordered during this visit.   EKG: appears normal, NSR, normal axis, normal intervals, no acute ST/T changes c/w ischemia, no LVH by voltage criteria, unchanged from previous tracings    Revised Cardiac Risk Index (RCRI):  The patient has the following serious cardiovascular risks for perioperative complications:   - No serious cardiac risks = 0 points     RCRI Interpretation: 0 points: Class I (very low risk - 0.4% " complication rate)           Signed Electronically by: Gene Scott MD,   Copy of this evaluation report is provided to requesting physician.

## 2023-01-03 NOTE — PROGRESS NOTES
[place] [address] [address]  [address]  [facility] [address]  Phone: [phone]  Primary Provider: Julee Scott  Pre-op Performing Provider: JULEE SCOTT      PREOPERATIVE EVALUATION:  Today's date: 1/3/2023    Emma Jenkins is a 77 year old female who presents for a preoperative evaluation.    Surgical Information:  Surgery/Procedure: colonoscopy  Surgery Location: [facility]  Surgeon: Jonatan  Surgery Date: 1/12/2023  Time of Surgery: 0630  Where patient plans to recover: At home with family  Fax number for surgical facility: Note does not need to be faxed, will be available electronically in Epic.    Type of Anesthesia Anticipated: moderate sedation    Assessment & Plan     The proposed surgical procedure is considered INTERMEDIATE risk.  (Z01.818) Pre-op examination  (primary encounter diagnosis)  Comment:  Satisfactory operative candidate with anesthesia as required.   Plan: EKG 12-lead complete w/read - Clinics          (D36.9) Adenomatous polyps  Comment: Reason for colonoscopy.     (F33.42) Major depressive disorder, recurrent episode, in full remission (H)  Comment: Remains in remission.     (M35.00) Sicca syndrome (H)  Comment: Relatively inactive problem.       RECOMMENDATION:  APPROVAL GIVEN to proceed with proposed procedure, without further diagnostic evaluation.    Patient Instructions   Everything looks fine to go ahead with surgery as planned.   Take losartan and metoprolol with sips of water on the morning of surgery.       Two phone number options for information about the colonoscopy prep:  Colonoscopy : [phone]  Colorectal surgeon (Shirlene Cheung) office: [phone]    See me in October 2023 for next Annual Wellness Visit.            Subjective     HPI related to upcoming procedure: No recent acute illness symptoms.         Preop Questions 1/2/2023   1. Have you ever had a heart attack or stroke? No   2. Have  you ever had surgery on your heart or blood vessels, such as a stent placement, a coronary artery bypass, or surgery on an artery in your head, neck, heart, or legs? No   3. Do you have chest pain with activity? No   4. Do you have a history of  heart failure? No   5. Do you currently have a cold, bronchitis or symptoms of other infection? No   6. Do you have a cough, shortness of breath, or wheezing? No   7. Do you or anyone in your family have previous history of blood clots? No   8. Do you or does anyone in your family have a serious bleeding problem such as prolonged bleeding following surgeries or cuts? No   9. Have you ever had problems with anemia or been told to take iron pills? YES -    10. Have you had any abnormal blood loss such as black, tarry or bloody stools, or abnormal vaginal bleeding? No   11. Have you ever had a blood transfusion? No   12. Are you willing to have a blood transfusion if it is medically needed before, during, or after your surgery? Yes   13. Have you or any of your relatives ever had problems with anesthesia? No   14. Do you have sleep apnea, excessive snoring or daytime drowsiness? No   15. Do you have any artifical heart valves or other implanted medical devices like a pacemaker, defibrillator, or continuous glucose monitor? No   16. Do you have artificial joints? No   17. Are you allergic to latex? No   18. Is there any chance that you may be pregnant? -       Health Care Directive:  Patient does not have a Health Care Directive or Living Will: Discussed advance care planning with patient; information given to patient to review.    Preoperative Review of :   reviewed - no record of controlled substances prescribed.    Past medical, family and social histories as well as medications reviewed and updated as needed.    Review of Systems  REVIEW OF SYSTEMS: The following systems have been completely reviewed and are negative except as noted above:   Constitutional, HEENT,  "respiratory, cardiovascular, gastrointestinal, genitourinary, hematologic, psychiatric, and neurologic systems.      Patient Active Problem List    Diagnosis Date Noted     Sicca syndrome (H) 11/27/2020     Priority: Medium     Gastroenteritis 03/28/2019     Priority: Medium     Anxiety 10/03/2018     Priority: Medium     Cervicalgia 09/10/2018     Priority: Medium     Major depressive disorder, recurrent episode, in full remission (H) 07/25/2018     Priority: Medium     Seropositive rheumatoid arthritis of multiple sites (H) 10/17/2016     Priority: Medium      Past Medical History:   Diagnosis Date     Anxiety and depression      Arrhythmia      Arthritis     rheumatoid arthritis     Depressive disorder      GERD (gastroesophageal reflux disease)      Heart arrhythmias      Irregular heart beat     \"fast heart beat when sleeping\"     Past Surgical History:   Procedure Laterality Date     APPENDECTOMY       BLEPHAROPLASTY BILATERAL Bilateral 1/9/2017    Procedure: BLEPHAROPLASTY BILATERAL;  Surgeon: Ismael Holt MD;  Location:  [facility]     COLONOSCOPY  06/04/2013    Adenomatous polyps, repeat in 5 years     COLONOSCOPY N/A 11/9/2018    3 polyps. Procedure: colonoscopy with polypectomy;  Surgeon: Nallely Alaniz MD;  Location: [facility] OR     GYN SURGERY      hysterectomy     HYSTERECTOMY, PAP NO LONGER INDICATED       ORTHOPEDIC SURGERY       REMOVE HARDWARE HAND  10/7/2011    Procedure:REMOVE HARDWARE HAND; Right Index Finger K-Wire Removal ; Surgeon:KELSEY ALAS; Location: [facility]     Current Outpatient Medications   Medication Sig Dispense Refill     alendronate (FOSAMAX) 70 MG tablet TAKE 1 TABLET (70 MG) BY MOUTH WITH 8OZ WATER EVERY 7 DAYS 30 MINUTES BEFORE BREAKFAST AND REMAIN UPRIGHT DURING THIS TIME. 13 tablet 3     citalopram (CELEXA) 20 MG tablet Take 1 tablet (20 mg) by mouth daily 90 tablet 3     etanercept (ENBREL) 50 MG/ML injection Inject 50 mg Subcutaneous once a week On Saturdays   "     losartan (COZAAR) 50 MG tablet Take 1 tablet (50 mg) by mouth daily 14 tablet 0     meclizine (ANTIVERT) 25 MG tablet Take 1 tablet (25 mg) by mouth 3 times daily as needed for dizziness 30 tablet 4     metoprolol tartrate (LOPRESSOR) 50 MG tablet TAKE ONE-HALF TABLET BY MOUTH EVERY MORNING AND TAKE ONE TABLET BY MOUTH EVERY NIGHT AT BEDTIME 135 tablet 3     omeprazole (PRILOSEC) 40 MG DR capsule TAKE 1 CAPSULE BY MOUTH EVERY DAY, TAKE 30-60 MINUTES BEFORE EATING 90 capsule 3     polyethylene glycol (MIRALAX/GLYCOLAX) Packet Take 1 packet by mouth daily       pravastatin (PRAVACHOL) 10 MG tablet Take 1 tablet (10 mg) by mouth daily 90 tablet 3     traZODone (DESYREL) 50 MG tablet TAKE ONE TABLET BY MOUTH AT BEDTIME 90 tablet 3     acyclovir (ZOVIRAX) 400 MG tablet Take 1 tablet (400 mg) by mouth 3 times daily (Patient not taking: Reported on 1/3/2023) 15 tablet 3     aspirin 81 MG tablet Take 1 tablet (81 mg) by mouth daily (Patient not taking: Reported on 10/7/2022) 30 tablet 0     calcium carb 1250 mg, 500 mg [place],/vitamin D 200 units (OSCAL WITH D) 500-200 MG-UNIT per tablet Take 1 tablet by mouth every morning  (Patient not taking: Reported on 10/7/2022)       Cholecalciferol (VITAMIN D3 PO) Take 2,500 Units by mouth daily  (Patient not taking: Reported on 10/7/2022)       multivitamin, therapeutic (THERA-VIT) TABS Take 1 tablet by mouth daily (Patient not taking: Reported on 10/7/2022)       Omega-3 Fatty Acids (OMEGA-3 FISH OIL PO) Take 1 g by mouth daily  (Patient not taking: Reported on 10/7/2022)         No Known Allergies     Social History     Tobacco Use     Smoking status: Former     Packs/day: 0.50     Years: 10.00     Pack years: 5.00     Types: Cigarettes     Start date: 1965     Quit date: 1975     Years since quittin.7     Smokeless tobacco: Never     Tobacco comments:     Would smoke in social settings   Substance Use Topics     Alcohol use: Yes     Alcohol/week: 4.0 standard  "drinks     Types: 4 Glasses of wine per week     Comment: Approximately 4 glasses per week     Family History   Problem Relation Age of Onset     Colon Cancer Mother 79         from colon cancer     Depression Father          age 94     Anxiety Disorder Father      Depression Sister         Born 1941     Anxiety Disorder Sister      Colon Cancer Brother 71        Born 1940. Had colon CA, cancer-free for 5+ years. B     Anxiety Disorder Brother         Born 1950, back issues     History   Drug Use No         Objective     BP (!) 142/82 (BP Location: Left arm, Patient Position: Sitting, Cuff Size: Adult Regular)   Pulse 76   Temp 97.8  F (36.6  C) (Tympanic)   Resp 16   Ht 1.63 m (5' 4.17\")   Wt 67.6 kg (149 lb)   LMP  (LMP Unknown)   SpO2 98%   Breastfeeding No   BMI 25.44 kg/m      Physical Exam  GENERAL APPEARANCE: healthy, alert and no distress  EYES: Eyes grossly normal to inspection, PERRL and conjunctivae and sclerae normal  HENT: ear canals and TM's normal and nose and mouth without ulcers or lesions  RESP: lungs clear to auscultation - no rales, rhonchi or wheezes  CV: regular rate and rhythm, normal S1 S2, no S3 or S4 and no murmur, click or rub   ABDOMEN: soft, nontender, no HSM or masses and bowel sounds normal  NEURO: Normal strength and tone, sensory exam grossly normal, mentation intact and speech normal    Recent Labs   Lab Test 10/07/22  1050 12/10/21  1051   HGB 12.6 12.1    222    130*   POTASSIUM 4.1 4.2   CR 0.71 0.69        Diagnostics:  No labs were ordered during this visit.   EKG: appears normal, NSR, normal axis, normal intervals, no acute ST/T changes c/w ischemia, no LVH by voltage criteria, unchanged from previous tracings    Revised Cardiac Risk Index (RCRI):  The patient has the following serious cardiovascular risks for perioperative complications:   - No serious cardiac risks = 0 points     RCRI Interpretation: 0 points: Class I (very low risk - 0.4% " complication rate)           Signed Electronically by: Gene Scott MD,   Copy of this evaluation report is provided to requesting physician.

## 2023-01-03 NOTE — LETTER
1/3/2023       RE: Emma Jenkins  [address]  [facility] [address]     Dear Colleague,    Thank you for referring your patient, Emma Jenkins, to the [facility] [place] [facility]. Please see a copy of my visit note below.    Subjective       REQUESTING PROVIDER   Gene Scott     REASON FOR CONSULT   Urinary incontinence    HISTORY OF PRESENT ILLNESS   Ms. Jenkins is a very pleasant 77-year-old female, who presents today for further evaluation recommendations regarding urinary incontinence.  She notes that this has been going on for a while.  In reviewing her chart, it was noted in 2019.  She does wear Depends when walking due to fecal urgency more so than urinary symptoms.  She endorses urinary urgency with urge incontinence, particularly bad in the overnight hours.  She notes occasional stress incontinence.  She has nocturia 1-2 times, and she does restrict fluids prior to bedtime.  She endorses some slight dysuria.  Denies hematuria or urinary frequency.    Patient does not regularly wear pads.  She has had issues with longstanding constipation.  She has also had longstanding issues with spinal stenosis, which is relatively controlled at this time.    Urinalysis is not convincing for infection.  Postvoid residual today is 20 mL.    Fluid intake typically includes 2 cups of coffee, 1 to 2 cups of water, and a glass of wine.  She does note that wine will worsen her urinary urgency and urge incontinence.    Distant history urinary tract infections.  She does note that she has some rectal and vaginal irritation.  She has not tried anything for her urinary incontinence.    It appears that she had a hysterectomy and a bladder surgery at 1 point.    The following portions of the patient's history were reviewed and updated as appropriate: allergies, current medications, past family history, past medical history, past social  "history, past surgical history and problem list.     REVIEW OF SYSTEMS   Review of Systems   Constitutional: Negative for chills and fever.   Respiratory: Negative for shortness of breath.    Cardiovascular: Negative for chest pain.   Gastrointestinal: Positive for constipation and diarrhea (More fecal urgency). Negative for nausea and vomiting.   Genitourinary: Positive for dysuria and urgency. Negative for difficulty urinating, frequency and hematuria.   Musculoskeletal: Positive for back pain (Stenosis and does exercises).      Per HPI.     Patient Active Problem List   Diagnosis     Seropositive rheumatoid arthritis of multiple sites (H)     Major depressive disorder, recurrent episode, in full remission (H)     Cervicalgia     Anxiety     Gastroenteritis     Sicca syndrome (H)      Past Medical History:   Diagnosis Date     Anxiety and depression      Arrhythmia      Arthritis     rheumatoid arthritis     Depressive disorder      GERD (gastroesophageal reflux disease)      Heart arrhythmias      Irregular heart beat     \"fast heart beat when sleeping\"     Mumps      Palpitations       Past Surgical History:   Procedure Laterality Date     APPENDECTOMY       BLADDER SURGERY       BLEPHAROPLASTY BILATERAL Bilateral 01/09/2017    Procedure: BLEPHAROPLASTY BILATERAL;  Surgeon: Ismael Holt MD;  Location:  [facility]     COLONOSCOPY  06/04/2013    Adenomatous polyps, repeat in 5 years     COLONOSCOPY N/A 11/09/2018    3 polyps. Procedure: colonoscopy with polypectomy;  Surgeon: Nallely Alaniz MD;  Location: [facility] OR     GYN SURGERY      hysterectomy     HYSTERECTOMY, PAP NO LONGER INDICATED       ORTHOPEDIC SURGERY       REMOVE HARDWARE HAND  10/07/2011    Procedure:REMOVE HARDWARE HAND; Right Index Finger K-Wire Removal ; Surgeon:KELSEY ALAS; Location: [facility]      Social History:   .     Objective       PHYSICAL EXAM   BP (!) 156/80   Ht 1.626 m (5' 4\")   Wt 67.6 kg (149 lb)   LMP  (LMP " Unknown)   BMI 25.58 kg/m     Physical Exam  Constitutional:       Appearance: Normal appearance.   HENT:      Head: Normocephalic.      Nose: Nose normal.   Eyes:      General: No scleral icterus.  Pulmonary:      Effort: Pulmonary effort is normal.   Abdominal:      General: There is no distension.   Genitourinary:     Comments: Normal intact perineal sensation bilaterally.  Hypermobility of the urethra appreciated, but no stress incontinence elicited with cough or Valsalva.  Left-sided internal labial erythema.  Otherwise remainder of the internal and external labia appear normal.  Slight vaginal discharge.  Grade 1 anterior prolapse, grade 1 apical prolapse, and no rectal prolapse.  Evidence of vaginal atrophy.  No significant evidence of pelvic floor tension myalgia.  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    External hemorrhoid with slight thrombosis.  Musculoskeletal:         General: Normal range of motion.      Cervical back: Normal range of motion.   Skin:     General: Skin is warm and dry.   Neurological:      General: No focal deficit present.      Mental Status: She is alert and oriented to person, place, and time.   Psychiatric:         Mood and Affect: Mood normal.         Behavior: Behavior normal.        LABORATORY   Recent Labs   Lab Test 01/03/23  1348 07/30/19  0920 03/28/19  1521   COLOR Yellow   < > Yellow   APPEARANCE Clear   < > Clear   URINEGLC Negative   < > Negative   URINEBILI Negative   < > Negative   URINEKETONE Negative   < > Negative   SG 1.010   < > 1.010   UBLD Negative   < > Negative   URINEPH 5.5   < > 6.0   PROTEIN Negative   < > Negative   UROBILINOGEN 0.2   < >  --    NITRITE Negative   < > Negative   LEUKEST Negative   < > Negative   RBCU  --   --  1   WBCU  --   --  2    < > = values in this interval not displayed.     TESTING    PVR: 20 mL    Assessment & Plan    1. Mixed stress and urge urinary incontinence    2. Chronic idiopathic constipation    3. Rectal itching    4.  Vaginal itching    5. Atrophic vaginitis        I had the pleasure today of meeting with Ms. Jenkins to discuss her urinary incontinence, vaginal itching, and vaginal atrophy.  We discussed that her urinary incontinence has a small stress incontinence component but appears to be much more urge incontinence.  Given the presence of both, we would consider this mixed urinary incontinence, but she does have urge predominant.  Portion also has longstanding underlying constipation.  We discussed the relationship between bowel and bladder and her constipation can worsen urinary urgency, frequency, emptying, and urge incontinence.  She also has evidence of vaginal discharge as well as some erythema in the vaginal labia area.    It is likely that her rectal itching is secondary to the hemorrhoid that was noted.    We discussed that we typically would treat urge incontinence first in a mixed incontinence picture of urge and stress incontinence. Typical treatments for urge incontinence include avoiding bladder irritants, biofeedback bladder retraining, overactive bladder medications such as oxybutynin, posterior tibial nerve stimulation (PTNS), Botox (which would require learning to perform clean intermittent catheterization and place it works too well), and InterStim. We typically go through this in a stepwise fashion.     We also briefly discussed stress incontinence treatment options including Kegel exercises/biofeedback bladder retraining/pelvic floor PT, E-stim pessary, Poise Impressa, sling, and bulking agents.      In discussion with the patient, she would like to avoid that if patient if possible.  We also discussed that she has already identified a bladder irritant for herself in the form of wine.    We will plan on the following:    -Patient was provided with a list of bladder irritants to use as a guideline.    -Referral to pelvic floor physical therapy.    -We will plan on wet prep to look for any evidence of yeast  or bacterial vaginosis infection.    -For hemorrhoid and urinary symptoms, discussed working on softening stool.  We also discussed usage of over-the-counter tools such as Tucks or Preparation H.  We also discussed trying to use MiraLAX regularly for a soft well-formed bowel movement once daily.    -For vaginal erythema and vaginal atrophy, would recommend starting topical estrogen cream.    Patient will plan on letting me know how she is doing.  Would consider trial of an overactive bladder medication in the future.  Would need to be mindful about possible side effects of constipation with the anticholinergics.    Signed by:     Vikki Mckeon PA-C 1/3/2023 2:13 PM

## 2023-01-03 NOTE — PATIENT INSTRUCTIONS
Everything looks fine to go ahead with surgery as planned.   Take losartan and metoprolol with sips of water on the morning of surgery.       Two phone number options for information about the colonoscopy prep:  Colonoscopy : [phone]  Colorectal surgeon (Shirlene Cheung) office: [phone]    See me in October 2023 for next Annual Wellness Visit.

## 2023-01-03 NOTE — PATIENT INSTRUCTIONS
Below is a list of things that can irritate the bladder and should try to remove to see if it improves your symptoms:     Caffeinated soft drinks.  Coffee.  Tea.  Chocolate.  Tomato-based foods.  Acidic juices and fruits. (includes cranberry juice)  Alcohol.  Carbonated drinks.  Aspartame/Nutrasweet (artificial sweeteners)  Vitamin C supplements and citrus fruit  Spicy food     **This order will print in the [facility] Scheduling Office**  Physical Therapy available through:    *[facility]    Call one number to schedule at any of the above locations: [phone].    Your provider has referred you to: Physical Therapy at [facility] or [facility]  Please be aware that coverage of these services is subject to the terms and limitations of your health insurance plan.  Call member services at your health plan with any benefit or coverage questions.      Please bring the following to your appointment:    *Your personal calendar for scheduling future appointments  *Comfortable clothing    Will plan on wet prep to look for any yeast infection.    For hemorrhoid, work on softening stool.  You can use Tucks pads over the counter or preparation H cream.      Start estrogen cream a pea to blueberry sized amount by the urethra and vaginal introitus at bedtime three times a week (Monday, Wednesday, and Friday).  If >$45, let me know and we can get via a compound pharmacy.  I would recommend NOT using the applicator.

## 2023-01-03 NOTE — NURSING NOTE
Chief Complaint   Patient presents with     Urinary incontinence     Pt. Reports she is having trouble with bladder control.  No frequency, difficulty urinating, burning or gross hematuria.  Feels the urge to urinate and leaks on the way to the bathroom.    PVR: 20 mL bl bladder scan    Yue Salcedo, EMT

## 2023-01-06 ENCOUNTER — TRANSFERRED RECORDS (OUTPATIENT)
Dept: HEALTH INFORMATION MANAGEMENT | Facility: CLINIC | Age: 78
End: 2023-01-06

## 2023-01-06 ENCOUNTER — THERAPY VISIT (OUTPATIENT)
Dept: PHYSICAL THERAPY | Facility: CLINIC | Age: 78
End: 2023-01-06
Attending: INTERNAL MEDICINE
Payer: COMMERCIAL

## 2023-01-06 DIAGNOSIS — M54.2 CERVICALGIA: Primary | ICD-10-CM

## 2023-01-06 LAB
ALT SERPL-CCNC: 21 IU/L (ref 5–35)
AST SERPL-CCNC: 30 U/L (ref 5–34)
CREATININE (EXTERNAL): 0.68 MG/DL (ref 0.5–1.3)

## 2023-01-06 PROCEDURE — 97112 NEUROMUSCULAR REEDUCATION: CPT | Mod: GP | Performed by: PHYSICAL THERAPIST

## 2023-01-06 PROCEDURE — 97110 THERAPEUTIC EXERCISES: CPT | Mod: GP | Performed by: PHYSICAL THERAPIST

## 2023-01-09 ENCOUNTER — LAB (OUTPATIENT)
Dept: LAB | Facility: CLINIC | Age: 78
End: 2023-01-09
Payer: COMMERCIAL

## 2023-01-09 DIAGNOSIS — Z20.822 SUSPECTED 2019 NOVEL CORONAVIRUS INFECTION: ICD-10-CM

## 2023-01-09 LAB — SARS-COV-2 RNA RESP QL NAA+PROBE: NEGATIVE

## 2023-01-09 PROCEDURE — U0005 INFEC AGEN DETEC AMPLI PROBE: HCPCS

## 2023-01-09 PROCEDURE — U0003 INFECTIOUS AGENT DETECTION BY NUCLEIC ACID (DNA OR RNA); SEVERE ACUTE RESPIRATORY SYNDROME CORONAVIRUS 2 (SARS-COV-2) (CORONAVIRUS DISEASE [COVID-19]), AMPLIFIED PROBE TECHNIQUE, MAKING USE OF HIGH THROUGHPUT TECHNOLOGIES AS DESCRIBED BY CMS-2020-01-R: HCPCS

## 2023-01-09 ASSESSMENT — ENCOUNTER SYMPTOMS
NAUSEA: 0
DIARRHEA: 1
FEVER: 0
BACK PAIN: 1
FREQUENCY: 0
CONSTIPATION: 1
DYSURIA: 1
HEMATURIA: 0
DIFFICULTY URINATING: 0
CHILLS: 0
SHORTNESS OF BREATH: 0
VOMITING: 0

## 2023-01-10 ENCOUNTER — TELEPHONE (OUTPATIENT)
Dept: GASTROENTEROLOGY | Facility: CLINIC | Age: 78
End: 2023-01-10

## 2023-01-10 NOTE — TELEPHONE ENCOUNTER
The patient called to verify the arrival time for her 1/12 procedure at [facility].  The  confirmed an arrival time of 7:15 am for the 8:00 am procedure.

## 2023-01-12 ENCOUNTER — ANESTHESIA (OUTPATIENT)
Dept: GASTROENTEROLOGY | Facility: CLINIC | Age: 78
End: 2023-01-12
Payer: COMMERCIAL

## 2023-01-12 ENCOUNTER — HOSPITAL ENCOUNTER (OUTPATIENT)
Facility: CLINIC | Age: 78
Discharge: HOME | End: 2023-01-12
Attending: COLON & RECTAL SURGERY | Admitting: COLON & RECTAL SURGERY
Payer: COMMERCIAL

## 2023-01-12 ENCOUNTER — ANESTHESIA EVENT (OUTPATIENT)
Dept: GASTROENTEROLOGY | Facility: CLINIC | Age: 78
End: 2023-01-12
Payer: COMMERCIAL

## 2023-01-12 VITALS
HEIGHT: 64 IN | OXYGEN SATURATION: 97 % | BODY MASS INDEX: 25.44 KG/M2 | HEART RATE: 63 BPM | WEIGHT: 149 LBS | RESPIRATION RATE: 13 BRPM | DIASTOLIC BLOOD PRESSURE: 79 MMHG | SYSTOLIC BLOOD PRESSURE: 121 MMHG

## 2023-01-12 DIAGNOSIS — Z12.11 ENCOUNTER FOR SCREENING COLONOSCOPY: Primary | ICD-10-CM

## 2023-01-12 LAB — COLONOSCOPY: NORMAL

## 2023-01-12 PROCEDURE — G0105 COLORECTAL SCRN; HI RISK IND: HCPCS | Performed by: COLON & RECTAL SURGERY

## 2023-01-12 PROCEDURE — 250N000009 HC RX 250: Performed by: NURSE ANESTHETIST, CERTIFIED REGISTERED

## 2023-01-12 PROCEDURE — 370N000017 HC ANESTHESIA TECHNICAL FEE, PER MIN: Performed by: COLON & RECTAL SURGERY

## 2023-01-12 PROCEDURE — 45378 DIAGNOSTIC COLONOSCOPY: CPT | Performed by: COLON & RECTAL SURGERY

## 2023-01-12 PROCEDURE — 999N000010 HC STATISTIC ANES STAT CODE-CRNA PER MINUTE: Performed by: COLON & RECTAL SURGERY

## 2023-01-12 PROCEDURE — 258N000003 HC RX IP 258 OP 636: Performed by: NURSE ANESTHETIST, CERTIFIED REGISTERED

## 2023-01-12 PROCEDURE — 250N000011 HC RX IP 250 OP 636: Performed by: NURSE ANESTHETIST, CERTIFIED REGISTERED

## 2023-01-12 RX ORDER — DEXMEDETOMIDINE HYDROCHLORIDE 4 UG/ML
INJECTION, SOLUTION INTRAVENOUS PRN
Status: DISCONTINUED | OUTPATIENT
Start: 2023-01-12 | End: 2023-01-12

## 2023-01-12 RX ORDER — SODIUM CHLORIDE, SODIUM LACTATE, POTASSIUM CHLORIDE, CALCIUM CHLORIDE 600; 310; 30; 20 MG/100ML; MG/100ML; MG/100ML; MG/100ML
INJECTION, SOLUTION INTRAVENOUS CONTINUOUS PRN
Status: DISCONTINUED | OUTPATIENT
Start: 2023-01-12 | End: 2023-01-12

## 2023-01-12 RX ORDER — LIDOCAINE HYDROCHLORIDE 20 MG/ML
INJECTION, SOLUTION INFILTRATION; PERINEURAL PRN
Status: DISCONTINUED | OUTPATIENT
Start: 2023-01-12 | End: 2023-01-12

## 2023-01-12 RX ORDER — LIDOCAINE 40 MG/G
CREAM TOPICAL
Status: CANCELLED | OUTPATIENT
Start: 2023-01-12

## 2023-01-12 RX ORDER — PROPOFOL 10 MG/ML
INJECTION, EMULSION INTRAVENOUS PRN
Status: DISCONTINUED | OUTPATIENT
Start: 2023-01-12 | End: 2023-01-12

## 2023-01-12 RX ORDER — ONDANSETRON 2 MG/ML
INJECTION INTRAMUSCULAR; INTRAVENOUS PRN
Status: DISCONTINUED | OUTPATIENT
Start: 2023-01-12 | End: 2023-01-12

## 2023-01-12 RX ORDER — ONDANSETRON 2 MG/ML
4 INJECTION INTRAMUSCULAR; INTRAVENOUS
Status: CANCELLED | OUTPATIENT
Start: 2023-01-12

## 2023-01-12 RX ORDER — PROPOFOL 10 MG/ML
INJECTION, EMULSION INTRAVENOUS CONTINUOUS PRN
Status: DISCONTINUED | OUTPATIENT
Start: 2023-01-12 | End: 2023-01-12

## 2023-01-12 RX ADMIN — PHENYLEPHRINE HYDROCHLORIDE 100 MCG: 10 INJECTION INTRAVENOUS at 09:03

## 2023-01-12 RX ADMIN — PROPOFOL 10 MG: 10 INJECTION, EMULSION INTRAVENOUS at 08:45

## 2023-01-12 RX ADMIN — LIDOCAINE HYDROCHLORIDE 40 MG: 20 INJECTION, SOLUTION INFILTRATION; PERINEURAL at 08:35

## 2023-01-12 RX ADMIN — DEXMEDETOMIDINE HYDROCHLORIDE 4 MCG: 100 INJECTION, SOLUTION INTRAVENOUS at 08:51

## 2023-01-12 RX ADMIN — PROPOFOL 10 MG: 10 INJECTION, EMULSION INTRAVENOUS at 08:43

## 2023-01-12 RX ADMIN — DEXMEDETOMIDINE HYDROCHLORIDE 8 MCG: 100 INJECTION, SOLUTION INTRAVENOUS at 08:35

## 2023-01-12 RX ADMIN — PROPOFOL 200 MCG/KG/MIN: 10 INJECTION, EMULSION INTRAVENOUS at 08:35

## 2023-01-12 RX ADMIN — ONDANSETRON 4 MG: 2 INJECTION INTRAMUSCULAR; INTRAVENOUS at 09:02

## 2023-01-12 RX ADMIN — SODIUM CHLORIDE, POTASSIUM CHLORIDE, SODIUM LACTATE AND CALCIUM CHLORIDE: 600; 310; 30; 20 INJECTION, SOLUTION INTRAVENOUS at 08:33

## 2023-01-12 RX ADMIN — PROPOFOL 10 MG: 10 INJECTION, EMULSION INTRAVENOUS at 08:49

## 2023-01-12 ASSESSMENT — ACTIVITIES OF DAILY LIVING (ADL)
ADLS_ACUITY_SCORE: 35
ADLS_ACUITY_SCORE: 33

## 2023-01-12 ASSESSMENT — LIFESTYLE VARIABLES: TOBACCO_USE: 1

## 2023-01-12 NOTE — ANESTHESIA POSTPROCEDURE EVALUATION
Patient: Emma Jenkins    Procedure: Procedure(s):  COLONOSCOPY       Anesthesia Type:  MAC    Note:  Disposition: Outpatient   Postop Pain Control: Uneventful            Sign Out: Well controlled pain   PONV: No   Neuro/Psych: Uneventful            Sign Out: Acceptable/Baseline neuro status   Airway/Respiratory: Uneventful            Sign Out: Acceptable/Baseline resp. status   CV/Hemodynamics: Uneventful            Sign Out: Acceptable CV status; No obvious hypovolemia; No obvious fluid overload   Other NRE: NONE   DID A NON-ROUTINE EVENT OCCUR? No           Last vitals:  Vitals Value Taken Time   BP     Temp     Pulse 60 01/12/23 0916   Resp 14 01/12/23 0916   SpO2 93 % 01/12/23 0916   Vitals shown include unvalidated device data.    Electronically Signed By: Aaliyah Guerra MD  January 12, 2023  9:17 AM

## 2023-01-12 NOTE — INTERVAL H&P NOTE
"I have reviewed the surgical (or preoperative) H&P that is linked to this encounter, and examined the patient. There are no significant changes    Clinical Conditions Present on Arrival:  Clinically Significant Risk Factors Present on Admission                    # Overweight: Estimated body mass index is 25.58 kg/m  as calculated from the following:    Height as of 1/3/23: 1.626 m (5' 4\").    Weight as of 1/3/23: 67.6 kg (149 lb).       "

## 2023-01-12 NOTE — ANESTHESIA PREPROCEDURE EVALUATION
"Anesthesia Pre-Procedure Evaluation    Patient: Emma Jenkins   MRN: [id number] : 1945        Procedure : Procedure(s):  COLONOSCOPY          Past Medical History:   Diagnosis Date     Anxiety and depression      Arrhythmia      Arthritis     rheumatoid arthritis     Depressive disorder      GERD (gastroesophageal reflux disease)      Heart arrhythmias      Irregular heart beat     \"fast heart beat when sleeping\"     Mumps      Palpitations       Past Surgical History:   Procedure Laterality Date     APPENDECTOMY       BLADDER SURGERY       BLEPHAROPLASTY BILATERAL Bilateral 2017    Procedure: BLEPHAROPLASTY BILATERAL;  Surgeon: Ismael Holt MD;  Location:  [facility]     COLONOSCOPY  2013    Adenomatous polyps, repeat in 5 years     COLONOSCOPY N/A 2018    3 polyps. Procedure: colonoscopy with polypectomy;  Surgeon: Nallely Alaniz MD;  Location: [facility]     GYN SURGERY      hysterectomy     HYSTERECTOMY, PAP NO LONGER INDICATED       ORTHOPEDIC SURGERY       REMOVE HARDWARE HAND  10/07/2011    Procedure:REMOVE HARDWARE HAND; Right Index Finger K-Wire Removal ; Surgeon:KELSEY ALAS; Location: [facility]      No Known Allergies   Social History     Tobacco Use     Smoking status: Former     Packs/day: 0.50     Years: 10.00     Pack years: 5.00     Types: Cigarettes     Start date: 1965     Quit date: 1975     Years since quittin.7     Smokeless tobacco: Never     Tobacco comments:     Would smoke in social settings   Substance Use Topics     Alcohol use: Yes     Alcohol/week: 4.0 standard drinks     Types: 4 Glasses of wine per week     Comment: Approximately 4 glasses per week      Wt Readings from Last 1 Encounters:   23 67.6 kg (149 lb)        Anesthesia Evaluation   Pt has had prior anesthetic.     No history of anesthetic complications       ROS/MED HX  ENT/Pulmonary:     (+) tobacco use, Past use,  (-) sleep apnea   Neurologic:       Cardiovascular:   "  (-) hypertension   METS/Exercise Tolerance:     Hematologic:       Musculoskeletal:   (+) arthritis,     GI/Hepatic:     (+) GERD,     Renal/Genitourinary:  - neg Renal ROS     Endo:    (-) Type II DM   Psychiatric/Substance Use:     (+) psychiatric history anxiety and depression     Infectious Disease:       Malignancy:       Other: Comment: Sicca syndrome           Physical Exam    Airway        Mallampati: II   TM distance: > 3 FB   Neck ROM: full   Mouth opening: > 3 cm    Respiratory Devices and Support         Dental       (+) caps      Cardiovascular   cardiovascular exam normal          Pulmonary   pulmonary exam normal                OUTSIDE LABS:  CBC:   Lab Results   Component Value Date    WBC 6.4 10/07/2022    WBC 7.0 12/10/2021    HGB 12.6 10/07/2022    HGB 12.1 12/10/2021    HCT 39.0 10/07/2022    HCT 36.9 12/10/2021     10/07/2022     12/10/2021     BMP:   Lab Results   Component Value Date     10/07/2022     (L) 12/10/2021    POTASSIUM 4.1 10/07/2022    POTASSIUM 4.2 12/10/2021    CHLORIDE 107 10/07/2022    CHLORIDE 101 12/10/2021    CO2 24 10/07/2022    CO2 25 12/10/2021    BUN 14 10/07/2022    BUN 16 12/10/2021    CR 0.71 10/07/2022    CR 0.69 12/10/2021    GLC 90 10/07/2022     (H) 12/10/2021     COAGS: No results found for: PTT, INR, FIBR  POC: No results found for: BGM, HCG, HCGS  HEPATIC:   Lab Results   Component Value Date    ALBUMIN 3.5 10/07/2022    PROTTOTAL 6.7 (L) 10/07/2022    ALT 20 10/07/2022    AST 23 10/07/2022    ALKPHOS 90 10/07/2022    BILITOTAL 0.5 10/07/2022     OTHER:   Lab Results   Component Value Date    LACT 0.6 (L) 03/28/2019    AILYN 9.1 10/07/2022    LIPASE 100 03/16/2014    TSH 2.49 10/07/2022       Anesthesia Plan    ASA Status:  2   NPO Status:  NPO Appropriate    Anesthesia Type: MAC.     - Reason for MAC: immobility needed              Consents    Anesthesia Plan(s) and associated risks, benefits, and realistic alternatives  discussed. Questions answered and patient/representative(s) expressed understanding.    - Discussed:     - Discussed with:  Patient         Postoperative Care    Pain management: IV analgesics.   PONV prophylaxis: Ondansetron (or other 5HT-3), Background Propofol Infusion     Comments:                Aaliyah Guerra MD

## 2023-01-12 NOTE — ANESTHESIA CARE TRANSFER NOTE
Patient: Emma Jenkins    Procedure: Procedure(s):  COLONOSCOPY       Diagnosis: Adenomatous polyps [D36.9]  Diagnosis Additional Information: No value filed.    Anesthesia Type:   MAC     Note:    Oropharynx: oropharynx clear of all foreign objects and spontaneously breathing  Level of Consciousness: awake  Oxygen Supplementation: room air    Independent Airway: airway patency satisfactory and stable  Dentition: dentition unchanged  Vital Signs Stable: post-procedure vital signs reviewed and stable  Report to RN Given: handoff report given  Destination: endoscopy.  Comments: After procedure in [facility] [place] under monitored anesthesia care (MAC), patient exhibited spontaneous respirations, patient breathing room air with oxygen saturation maintained greater than 95%, patient brought to [facility] [place] recovery bay for postprocedure recovery, SpO2, NiBP, and EKG monitors and alarms on and functioning, report on patient's clinical status given to recovery-trained endoscopy RN, RN questions answered.          Vitals:  Vitals Value Taken Time   BP     Temp     Pulse     Resp     SpO2         Electronically Signed By: GLORIA Perdomo CRNA  January 12, 2023  9:09 AM

## 2023-01-20 ENCOUNTER — THERAPY VISIT (OUTPATIENT)
Dept: PHYSICAL THERAPY | Facility: CLINIC | Age: 78
End: 2023-01-20
Payer: COMMERCIAL

## 2023-01-20 DIAGNOSIS — M54.2 CERVICALGIA: Primary | ICD-10-CM

## 2023-01-20 PROCEDURE — 97110 THERAPEUTIC EXERCISES: CPT | Mod: GP | Performed by: PHYSICAL THERAPIST

## 2023-01-20 PROCEDURE — 97112 NEUROMUSCULAR REEDUCATION: CPT | Mod: GP | Performed by: PHYSICAL THERAPIST

## 2023-01-20 NOTE — PROGRESS NOTES
DISCHARGE REPORT    Progress reporting period is from 12-6-22 to 1-20-23.       SUBJECTIVE  Subjective changes noted by patient:  .  Subjective: Improved compliance; reports she is at least 50% improved; Feels she can self lmanage;    Current pain level is 1/10  .     Previous pain level was  7/10 Initial Pain level: 7/10.   Changes in function:  Yes (See Goal flowsheet attached for changes in current functional level)  Adverse reaction to treatment or activity: None    OBJECTIVE  Changes noted in objective findings:  The objective findings below are from DOS 1-20-23.  Objective: Cx ROM: B Rot=min loss, erp, EXT=min to mod losss     ASSESSMENT/PLAN  Updated problem list and treatment plan: Diagnosis 1:  cervicalgia Pain -  self management, education, directional preference exercise and home program  Decreased ROM/flexibility - manual therapy and therapeutic exercise  Decreased joint mobility - manual therapy and therapeutic exercise  Decreased strength - therapeutic exercise and therapeutic activities  Decreased function - therapeutic activities  Impaired posture - neuro re-education  STG/LTGs have been met or progress has been made towards goals:  Yes (See Goal flow sheet completed today.)  Assessment of Progress: The patient's condition has potential to improve.  Patient is meeting short term goals and is progressing towards long term goals.  Self Management Plans:  Patient is independent in a home treatment program.  Patient is independent in self management of symptoms.  I have re-evaluated this patient and find that the nature, scope, duration and intensity of the therapy is appropriate for the medical condition of the patient.  Emma continues to require the following intervention to meet STG and LTG's:  PT intervention is no longer required to meet STG/LTG.    Recommendations:  This patient is ready to be discharged from therapy and continue their home treatment program.    Please refer to the daily  flowsheet for treatment today, total treatment time and time spent performing 1:1 timed codes.

## 2023-01-26 ENCOUNTER — TELEPHONE (OUTPATIENT)
Dept: INTERNAL MEDICINE | Facility: CLINIC | Age: 78
End: 2023-01-26

## 2023-01-26 NOTE — TELEPHONE ENCOUNTER
[facility] is calling to ask Dr Scott to attest the certification for rehabilitation in epic. Questions? Please call [facility] at [phone]

## 2023-02-06 DIAGNOSIS — I10 ESSENTIAL HYPERTENSION: ICD-10-CM

## 2023-02-08 RX ORDER — LOSARTAN POTASSIUM 50 MG/1
TABLET ORAL
Qty: 90 TABLET | Refills: 1 | Status: SHIPPED | OUTPATIENT
Start: 2023-02-08 | End: 2023-08-09

## 2023-04-11 ENCOUNTER — TRANSFERRED RECORDS (OUTPATIENT)
Dept: HEALTH INFORMATION MANAGEMENT | Facility: CLINIC | Age: 78
End: 2023-04-11
Payer: COMMERCIAL

## 2023-04-11 LAB
ALT SERPL-CCNC: 18 IU/L (ref 5–35)
AST SERPL-CCNC: 29 U/L (ref 5–34)
CREATININE (EXTERNAL): 0.68 MG/DL (ref 0.5–1.3)

## 2023-05-11 PROBLEM — M54.2 CERVICALGIA: Status: RESOLVED | Noted: 2018-09-10 | Resolved: 2023-05-11

## 2023-07-01 ENCOUNTER — TRANSFERRED RECORDS (OUTPATIENT)
Dept: MULTI SPECIALTY CLINIC | Facility: CLINIC | Age: 78
End: 2023-07-01

## 2023-07-01 LAB — RETINOPATHY: NORMAL

## 2023-08-09 DIAGNOSIS — I10 ESSENTIAL HYPERTENSION: ICD-10-CM

## 2023-08-09 RX ORDER — LOSARTAN POTASSIUM 50 MG/1
TABLET ORAL
Qty: 90 TABLET | Refills: 1 | Status: SHIPPED | OUTPATIENT
Start: 2023-08-09 | End: 2023-11-28

## 2023-08-10 ENCOUNTER — TRANSFERRED RECORDS (OUTPATIENT)
Dept: HEALTH INFORMATION MANAGEMENT | Facility: CLINIC | Age: 78
End: 2023-08-10
Payer: COMMERCIAL

## 2023-09-26 DIAGNOSIS — K21.9 GASTROESOPHAGEAL REFLUX DISEASE WITHOUT ESOPHAGITIS: ICD-10-CM

## 2023-09-26 DIAGNOSIS — E78.5 HYPERLIPIDEMIA LDL GOAL <130: ICD-10-CM

## 2023-09-26 RX ORDER — PRAVASTATIN SODIUM 10 MG
10 TABLET ORAL DAILY
Qty: 90 TABLET | Refills: 0 | Status: SHIPPED | OUTPATIENT
Start: 2023-09-26 | End: 2023-11-28

## 2023-09-26 RX ORDER — OMEPRAZOLE 40 MG/1
CAPSULE, DELAYED RELEASE ORAL
Qty: 90 CAPSULE | Refills: 0 | Status: SHIPPED | OUTPATIENT
Start: 2023-09-26 | End: 2023-11-28

## 2023-10-20 DIAGNOSIS — M85.80 OSTEOPENIA, UNSPECIFIED LOCATION: ICD-10-CM

## 2023-10-24 RX ORDER — ALENDRONATE SODIUM 70 MG/1
TABLET ORAL
Qty: 12 TABLET | Refills: 3 | OUTPATIENT
Start: 2023-10-24

## 2023-11-08 DIAGNOSIS — G47.00 INSOMNIA, UNSPECIFIED TYPE: ICD-10-CM

## 2023-11-08 RX ORDER — TRAZODONE HYDROCHLORIDE 50 MG/1
TABLET, FILM COATED ORAL
Qty: 90 TABLET | Refills: 0 | Status: SHIPPED | OUTPATIENT
Start: 2023-11-08 | End: 2023-11-28

## 2023-11-09 ENCOUNTER — TRANSFERRED RECORDS (OUTPATIENT)
Dept: HEALTH INFORMATION MANAGEMENT | Facility: CLINIC | Age: 78
End: 2023-11-09
Payer: COMMERCIAL

## 2023-11-09 LAB
ALT SERPL-CCNC: 22 IU/L (ref 5–35)
AST SERPL-CCNC: 29 U/L (ref 5–34)
CREATININE (EXTERNAL): 0.75 MG/DL (ref 0.5–1.3)

## 2023-11-11 ENCOUNTER — HEALTH MAINTENANCE LETTER (OUTPATIENT)
Age: 78
End: 2023-11-11

## 2023-11-28 ENCOUNTER — OFFICE VISIT (OUTPATIENT)
Dept: INTERNAL MEDICINE | Facility: CLINIC | Age: 78
End: 2023-11-28
Payer: COMMERCIAL

## 2023-11-28 VITALS
OXYGEN SATURATION: 95 % | SYSTOLIC BLOOD PRESSURE: 122 MMHG | HEART RATE: 69 BPM | WEIGHT: 148 LBS | RESPIRATION RATE: 14 BRPM | BODY MASS INDEX: 25.27 KG/M2 | HEIGHT: 64 IN | DIASTOLIC BLOOD PRESSURE: 72 MMHG | TEMPERATURE: 98.2 F

## 2023-11-28 DIAGNOSIS — G47.00 INSOMNIA, UNSPECIFIED TYPE: ICD-10-CM

## 2023-11-28 DIAGNOSIS — R42 DIZZINESS: ICD-10-CM

## 2023-11-28 DIAGNOSIS — Z00.00 ENCOUNTER FOR MEDICARE ANNUAL WELLNESS EXAM: Primary | ICD-10-CM

## 2023-11-28 DIAGNOSIS — E78.5 HYPERLIPIDEMIA LDL GOAL <130: ICD-10-CM

## 2023-11-28 DIAGNOSIS — I10 ESSENTIAL HYPERTENSION: ICD-10-CM

## 2023-11-28 DIAGNOSIS — R00.0 TACHYCARDIA: ICD-10-CM

## 2023-11-28 DIAGNOSIS — K21.9 GASTROESOPHAGEAL REFLUX DISEASE WITHOUT ESOPHAGITIS: ICD-10-CM

## 2023-11-28 DIAGNOSIS — M05.79 SEROPOSITIVE RHEUMATOID ARTHRITIS OF MULTIPLE SITES (H): ICD-10-CM

## 2023-11-28 DIAGNOSIS — Z00.00 ROUTINE GENERAL MEDICAL EXAMINATION AT A HEALTH CARE FACILITY: ICD-10-CM

## 2023-11-28 LAB
ALBUMIN SERPL BCG-MCNC: 4 G/DL (ref 3.5–5.2)
ALP SERPL-CCNC: 92 U/L (ref 40–150)
ALT SERPL W P-5'-P-CCNC: 24 U/L (ref 0–50)
ANION GAP SERPL CALCULATED.3IONS-SCNC: 8 MMOL/L (ref 7–15)
AST SERPL W P-5'-P-CCNC: 32 U/L (ref 0–45)
BASOPHILS # BLD AUTO: 0 10E3/UL (ref 0–0.2)
BASOPHILS NFR BLD AUTO: 0 %
BILIRUB SERPL-MCNC: 0.5 MG/DL
BUN SERPL-MCNC: 12.7 MG/DL (ref 8–23)
CALCIUM SERPL-MCNC: 9.8 MG/DL (ref 8.8–10.2)
CHLORIDE SERPL-SCNC: 101 MMOL/L (ref 98–107)
CHOLEST SERPL-MCNC: 190 MG/DL
CREAT SERPL-MCNC: 0.72 MG/DL (ref 0.51–0.95)
DEPRECATED HCO3 PLAS-SCNC: 25 MMOL/L (ref 22–29)
EGFRCR SERPLBLD CKD-EPI 2021: 85 ML/MIN/1.73M2
EOSINOPHIL # BLD AUTO: 0.2 10E3/UL (ref 0–0.7)
EOSINOPHIL NFR BLD AUTO: 2 %
ERYTHROCYTE [DISTWIDTH] IN BLOOD BY AUTOMATED COUNT: 12.8 % (ref 10–15)
GLUCOSE SERPL-MCNC: 89 MG/DL (ref 70–99)
HCT VFR BLD AUTO: 38.5 % (ref 35–47)
HDLC SERPL-MCNC: 52 MG/DL
HGB BLD-MCNC: 12.9 G/DL (ref 11.7–15.7)
IMM GRANULOCYTES # BLD: 0 10E3/UL
IMM GRANULOCYTES NFR BLD: 0 %
LDLC SERPL CALC-MCNC: 126 MG/DL
LYMPHOCYTES # BLD AUTO: 1.8 10E3/UL (ref 0.8–5.3)
LYMPHOCYTES NFR BLD AUTO: 26 %
MCH RBC QN AUTO: 29.3 PG (ref 26.5–33)
MCHC RBC AUTO-ENTMCNC: 33.5 G/DL (ref 31.5–36.5)
MCV RBC AUTO: 88 FL (ref 78–100)
MONOCYTES # BLD AUTO: 0.8 10E3/UL (ref 0–1.3)
MONOCYTES NFR BLD AUTO: 12 %
NEUTROPHILS # BLD AUTO: 4.1 10E3/UL (ref 1.6–8.3)
NEUTROPHILS NFR BLD AUTO: 60 %
NONHDLC SERPL-MCNC: 138 MG/DL
PLATELET # BLD AUTO: 212 10E3/UL (ref 150–450)
POTASSIUM SERPL-SCNC: 4.6 MMOL/L (ref 3.4–5.3)
PROT SERPL-MCNC: 6.8 G/DL (ref 6.4–8.3)
RBC # BLD AUTO: 4.4 10E6/UL (ref 3.8–5.2)
SODIUM SERPL-SCNC: 134 MMOL/L (ref 135–145)
TRIGL SERPL-MCNC: 61 MG/DL
TSH SERPL DL<=0.005 MIU/L-ACNC: 1.94 UIU/ML (ref 0.3–4.2)
WBC # BLD AUTO: 6.8 10E3/UL (ref 4–11)

## 2023-11-28 PROCEDURE — G0439 PPPS, SUBSEQ VISIT: HCPCS | Performed by: INTERNAL MEDICINE

## 2023-11-28 PROCEDURE — 80061 LIPID PANEL: CPT | Performed by: INTERNAL MEDICINE

## 2023-11-28 PROCEDURE — 84443 ASSAY THYROID STIM HORMONE: CPT | Performed by: INTERNAL MEDICINE

## 2023-11-28 PROCEDURE — 99214 OFFICE O/P EST MOD 30 MIN: CPT | Mod: 25 | Performed by: INTERNAL MEDICINE

## 2023-11-28 PROCEDURE — 85025 COMPLETE CBC W/AUTO DIFF WBC: CPT | Performed by: INTERNAL MEDICINE

## 2023-11-28 PROCEDURE — 36415 COLL VENOUS BLD VENIPUNCTURE: CPT | Performed by: INTERNAL MEDICINE

## 2023-11-28 PROCEDURE — 80053 COMPREHEN METABOLIC PANEL: CPT | Performed by: INTERNAL MEDICINE

## 2023-11-28 RX ORDER — OMEPRAZOLE 40 MG/1
CAPSULE, DELAYED RELEASE ORAL
Qty: 90 CAPSULE | Refills: 3 | Status: SHIPPED | OUTPATIENT
Start: 2023-11-28

## 2023-11-28 RX ORDER — METOPROLOL TARTRATE 50 MG
TABLET ORAL
Qty: 135 TABLET | Refills: 3 | Status: SHIPPED | OUTPATIENT
Start: 2023-11-28

## 2023-11-28 RX ORDER — LOSARTAN POTASSIUM 50 MG/1
50 TABLET ORAL DAILY
Qty: 90 TABLET | Refills: 3 | Status: SHIPPED | OUTPATIENT
Start: 2023-11-28

## 2023-11-28 RX ORDER — RESPIRATORY SYNCYTIAL VIRUS VACCINE 120MCG/0.5
0.5 KIT INTRAMUSCULAR ONCE
Qty: 1 EACH | Refills: 0 | Status: CANCELLED | OUTPATIENT
Start: 2023-11-28 | End: 2023-11-28

## 2023-11-28 RX ORDER — TRAZODONE HYDROCHLORIDE 50 MG/1
TABLET, FILM COATED ORAL
Qty: 90 TABLET | Refills: 3 | Status: SHIPPED | OUTPATIENT
Start: 2023-11-28

## 2023-11-28 RX ORDER — PRAVASTATIN SODIUM 10 MG
10 TABLET ORAL DAILY
Qty: 90 TABLET | Refills: 3 | Status: SHIPPED | OUTPATIENT
Start: 2023-11-28

## 2023-11-28 RX ORDER — MECLIZINE HYDROCHLORIDE 25 MG/1
25 TABLET ORAL 3 TIMES DAILY PRN
Qty: 30 TABLET | Refills: 3 | Status: SHIPPED | OUTPATIENT
Start: 2023-11-28

## 2023-11-28 SDOH — HEALTH STABILITY: PHYSICAL HEALTH: ON AVERAGE, HOW MANY DAYS PER WEEK DO YOU ENGAGE IN MODERATE TO STRENUOUS EXERCISE (LIKE A BRISK WALK)?: 5 DAYS

## 2023-11-28 SDOH — HEALTH STABILITY: PHYSICAL HEALTH: ON AVERAGE, HOW MANY MINUTES DO YOU ENGAGE IN EXERCISE AT THIS LEVEL?: 90 MIN

## 2023-11-28 ASSESSMENT — ENCOUNTER SYMPTOMS
DIARRHEA: 0
PALPITATIONS: 0
SORE THROAT: 0
ABDOMINAL PAIN: 0
CONSTIPATION: 1
PARESTHESIAS: 0
BREAST MASS: 0
COUGH: 0
SHORTNESS OF BREATH: 0
NERVOUS/ANXIOUS: 0
NAUSEA: 0
HEMATOCHEZIA: 0
HEMATURIA: 0
DYSURIA: 0
FEVER: 0
HEADACHES: 0
WEAKNESS: 1
MYALGIAS: 0
CHILLS: 0
HEARTBURN: 0
JOINT SWELLING: 0
EYE PAIN: 0
ARTHRALGIAS: 1
DIZZINESS: 0
FREQUENCY: 0

## 2023-11-28 ASSESSMENT — LIFESTYLE VARIABLES
AUDIT-C TOTAL SCORE: 4
HOW MANY STANDARD DRINKS CONTAINING ALCOHOL DO YOU HAVE ON A TYPICAL DAY: 1 OR 2
SKIP TO QUESTIONS 9-10: 1
HOW OFTEN DO YOU HAVE SIX OR MORE DRINKS ON ONE OCCASION: NEVER
HOW OFTEN DO YOU HAVE A DRINK CONTAINING ALCOHOL: 4 OR MORE TIMES A WEEK

## 2023-11-28 ASSESSMENT — ACTIVITIES OF DAILY LIVING (ADL): CURRENT_FUNCTION: NO ASSISTANCE NEEDED

## 2023-11-28 NOTE — PROGRESS NOTES
"SUBJECTIVE:   Emma is a 78 year old, presenting for the following:  Medicare Visit        11/28/2023    10:24 AM   Additional Questions   Roomed by Leelee MONTGOMERY CMA       Are you in the first 12 months of your Medicare coverage?  No    Healthy Habits:     In general, how would you rate your overall health?  Good    Frequency of exercise:  2-3 days/week    Duration of exercise:  15-30 minutes    Do you usually eat at least 4 servings of fruit and vegetables a day, include whole grains    & fiber and avoid regularly eating high fat or \"junk\" foods?  No    Taking medications regularly:  Yes    Medication side effects:  Not applicable    Ability to successfully perform activities of daily living:  No assistance needed    Home Safety:  No safety concerns identified    Hearing Impairment:  Difficulty understanding soft or whispered speech    In the past 6 months, have you been bothered by leaking of urine? Yes    In general, how would you rate your overall mental or emotional health?  Good    Additional concerns today:  No  Answers submitted by the patient for this visit:  Patient Health Questionnaire (Submitted on 11/28/2023)  If you checked off any problems, how difficult have these problems made it for you to do your work, take care of things at home, or get along with other people?: Not difficult at all  PHQ9 TOTAL SCORE: 0      Today's PHQ-9 Score:       11/28/2023    10:07 AM   PHQ-9 SCORE   PHQ-9 Total Score MyChart 0   PHQ-9 Total Score 0           Have you ever done Advance Care Planning? (For example, a Health Directive, POLST, or a discussion with a medical provider or your loved ones about your wishes): Yes, patient states has an Advance Care Planning document and will bring a copy to the clinic.       Fall risk  Fallen 2 or more times in the past year?: No  Any fall with injury in the past year?: No    Cognitive Screening   1) Repeat 3 items (Leader, Season, Table)    2) Clock draw: NORMAL  3) 3 item recall: " "Recalls 3 objects  Results: 3 items recalled: COGNITIVE IMPAIRMENT LESS LIKELY    Mini-CogTM Copyright [employer]. Licensed by the author for use in [facility]; reprinted with permission (dyllan@[email or web address]). All rights reserved.      Do you have sleep apnea, excessive snoring or daytime drowsiness? : no    Reviewed and updated as needed this visit by clinical staff   Tobacco  Allergies  Meds              Reviewed and updated as needed this visit by Provider                 Social History     Tobacco Use    Smoking status: Former     Packs/day: 0.50     Years: 10.00     Additional pack years: 0.00     Total pack years: 5.00     Types: Cigarettes     Start date: 1965     Quit date: 1975     Years since quittin.6    Smokeless tobacco: Never    Tobacco comments:     Would smoke in social settings   Substance Use Topics    Alcohol use: Yes     Alcohol/week: 4.0 standard drinks of alcohol     Types: 4 Glasses of wine per week     Comment: Approximately 4 glasses per week             2023    10:11 AM   Alcohol Use   Prescreen: >3 drinks/day or >7 drinks/week? No     Do you have a current opioid prescription? No  Do you use any other controlled substances or medications that are not prescribed by a provider? None      Eye exam with ophthalmology on this date: [facility]; 2023        PROBLEMS TO ADD ON...\"...In addition to an Annual Wellness Exam, we addressed depression, osteopenia, prior tachycardia, GERD, dizziness, hyperlipidemia, insomnia, recurrent HSV.     The patient is tolerating her current medications without any adverse effects.    She is offered refills of meds for the following conditions:  Acyclovir for recurrent HSV  Citalopram helpful for chronic depressive symptoms.  Meclizine helpful for episodic dizziness.   Metoprolol taken for BP and prior tachycardia.     Tolerating alendronate for osteopenia. We reviewed her most recent DEXA scan. We discussed that she might try taking " a break from continued chronic use of her bisphosphonate medication.     Omeprazole helpful for GERD symptoms. No dysphagia or odynophagia. She wonders whether she could reduce or discontinue taking this.   Pravastatin helpful for hyperlipidemia.   Trazodone helps her to sleep.   Miralax helps for chronic constipation.        Current providers sharing in care for this patient include:   Patient Care Team:  Gene Scott MD as PCP - General (Internal Medicine-Hematology & Oncology)  Karsten Malone MD as MD (Internal Medicine)  Gene Scott MD as Assigned PCP  Vikki Mckeon PA-C as Assigned Surgical Provider    The following health maintenance items are reviewed in Epic and correct as of today:  Health Maintenance   Topic Date Due    DEPRESSION ACTION PLAN  Never done    RSV VACCINE (Pregnancy & 60+) (1 - 1-dose 60+ series) Never done    COVID-19 Vaccine (5 - 2023-24 season) 09/01/2023    MEDICARE ANNUAL WELLNESS VISIT  10/07/2023    ANNUAL REVIEW OF HM ORDERS  10/07/2023    PHQ-9  05/28/2024    FALL RISK ASSESSMENT  11/28/2024    DTAP/TDAP/TD IMMUNIZATION (4 - Td or Tdap) 10/17/2026    LIPID  10/07/2027    ADVANCE CARE PLANNING  10/09/2027    DEXA  10/03/2033    HEPATITIS C SCREENING  Completed    INFLUENZA VACCINE  Completed    Pneumococcal Vaccine: 65+ Years  Completed    ZOSTER IMMUNIZATION  Completed    IPV IMMUNIZATION  Aged Out    HPV IMMUNIZATION  Aged Out    MENINGITIS IMMUNIZATION  Aged Out    RSV MONOCLONAL ANTIBODY  Aged Out    MAMMO SCREENING  Discontinued    COLORECTAL CANCER SCREENING  Discontinued           Pneumonia Vaccine:  Patient has received both pneumonia vaccinations (Prevnar-13 and Pneumovax-23)     Mammogram Screening: Mammogram Screening - Patient over age 75, has elected to continue with screening.    Pertinent mammograms are reviewed under the imaging tab.    Review of Systems   Constitutional:  Negative for chills and fever.   HENT:  Negative for congestion, ear pain,  "hearing loss and sore throat.    Eyes:  Negative for pain and visual disturbance.   Respiratory:  Negative for cough and shortness of breath.    Cardiovascular:  Negative for chest pain, palpitations and peripheral edema.   Gastrointestinal:  Positive for constipation. Negative for abdominal pain, diarrhea, heartburn, hematochezia and nausea.   Breasts:  Negative for tenderness, breast mass and discharge.   Genitourinary:  Negative for dysuria, frequency, genital sores, hematuria, pelvic pain, urgency, vaginal bleeding and vaginal discharge.   Musculoskeletal:  Positive for arthralgias. Negative for joint swelling and myalgias.   Skin:  Negative for rash.   Neurological:  Positive for weakness. Negative for dizziness, headaches and paresthesias.   Psychiatric/Behavioral:  Negative for mood changes. The patient is not nervous/anxious.        OBJECTIVE:   /72 (BP Location: Left arm, Patient Position: Sitting, Cuff Size: Adult Large)   Pulse 69   Temp 98.2  F (36.8  C) (Oral)   Resp 14   Ht 1.626 m (5' 4\")   Wt 67.1 kg (148 lb)   LMP  (LMP Unknown)   SpO2 95%   Breastfeeding No   BMI 25.40 kg/m   Estimated body mass index is 25.4 kg/m  as calculated from the following:    Height as of this encounter: 1.626 m (5' 4\").    Weight as of this encounter: 67.1 kg (148 lb).    Physical Exam  GENERAL: healthy, alert and no distress  EYES: Eyes grossly normal to inspection, PERRL and conjunctivae and sclerae normal  HENT: ear canals and TM's normal, nose and mouth without ulcers or lesions  NECK: no adenopathy, no asymmetry, masses, or scars and thyroid normal to palpation  RESP: lungs clear to auscultation - no rales, rhonchi or wheezes  BREAST/PELVIC: exams not performed.  CV: regular rate and rhythm, normal S1 S2, no S3 or S4, no murmur, click or rub, no peripheral edema and peripheral pulses strong  ABDOMEN: soft, nontender, no hepatosplenomegaly, no masses and bowel sounds normal  MS: no gross musculoskeletal " defects noted, no edema  SKIN: no suspicious lesions or rashes  NEURO: Normal strength and tone, mentation intact and speech normal  PSYCH: mentation appears normal, affect normal/bright     Diagnostic Test Results:  Labs reviewed in Epic    ASSESSMENT / PLAN:   (Z00.00) Encounter for Medicare annual wellness exam  (primary encounter diagnosis)  Comment: Stable health. See epic orders.     (I10) Essential hypertension  Comment: BP at target. Continue current meds.   Plan: losartan (COZAAR) 50 MG tablet, OFFICE/OUTPT         VISIT,EST,LEVL IV          (R42) Dizziness  Comment: Stable. Refilled meclizine for episodic use.   Plan: meclizine (ANTIVERT) 25 MG tablet, OFFICE/OUTPT        VISIT,EST,LEVL IV          (R00.0) Tachycardia  Comment: Continue metoprolol.   Plan: metoprolol tartrate (LOPRESSOR) 50 MG tablet,         OFFICE/OUTPT VISIT,EST,LEVL IV          (K21.9) Gastroesophageal reflux disease without esophagitis  Comment: Patient will consider reducing dosing of omeprazole, using famotidine prn.   Plan: omeprazole (PRILOSEC) 40 MG DR capsule,         OFFICE/OUTPT VISIT,EST,LEVL IV          (E78.5) Hyperlipidemia LDL goal <130  Comment: Update lipids. Continue current meds.   Plan: pravastatin (PRAVACHOL) 10 MG tablet,         OFFICE/OUTPT VISIT,EST,LEVL IV          (G47.00) Insomnia, unspecified type  Comment: Stable. Continue current meds.   Plan: traZODone (DESYREL) 50 MG tablet, OFFICE/OUTPT         VISIT,EST,LEVL IV          (M05.79) Seropositive rheumatoid arthritis of multiple sites (H)  Comment: Stable.     (Z00.00) Routine general medical examination at a health care facility  Plan: Lipid panel reflex to direct LDL Fasting, TSH         with free T4 reflex, Comprehensive metabolic         panel (BMP + Alb, Alk Phos, ALT, AST, Total.         Bili, TP), CBC with platelets and differential        Patient has been advised of split billing requirements and indicates understanding:  "Yes      COUNSELING:  Reviewed preventive health counseling, as reflected in patient instructions      BMI:   Estimated body mass index is 25.4 kg/m  as calculated from the following:    Height as of this encounter: 1.626 m (5' 4\").    Weight as of this encounter: 67.1 kg (148 lb).         She reports that she quit smoking about 48 years ago. Her smoking use included cigarettes. She started smoking about 58 years ago. She has a 5.00 pack-year smoking history. She has never used smokeless tobacco.      Appropriate preventive services were discussed with this patient, including applicable screening as appropriate for fall prevention, nutrition, physical activity, Tobacco-use cessation, weight loss and cognition.  Checklist reviewing preventive services available has been given to the patient.    Reviewed patients plan of care and provided an AVS. The Basic Care Plan (routine screening as documented in Health Maintenance) for Emma meets the Care Plan requirement. This Care Plan has been established and reviewed with the Patient.          Gene Scott MD,   [facility]      Patient Instructions     Overall everything looks fine!    You might try famotidine (Pepcid) 20 mg once or twice a day as NEEDED for heartburn, in place of omeprazole.     Okay to take a break from alendronate for a while--we are doing this routinely in patient's who've taken it for year.   Recommend scheduling an updated bone density test (baseline to compare in 2-3 years off of alendronate).    Refilled all other meds.   You might try over the counter Debrox drops (or half-strength hydrogen peroxide: mix equal parts of peroxide with warm tap water), a few drops in one or both ears, a few times a week.   If ears feel plugged still, could consider seeing ENT.     Will update labs.   Lab results will be available soon on [facility].    See me in a year, sooner if problems.               Identified Health Risks:  I have " reviewed Opioid Use Disorder and Substance Use Disorder risk factors and made any needed referrals. The patient was counseled and encouraged to consider modifying their diet and eating habits. She was provided with information on recommended healthy diet options.  The patient was provided with written information regarding signs of hearing loss.  Information on urinary incontinence and treatment options given to patient.

## 2023-11-28 NOTE — PATIENT INSTRUCTIONS
Patient Education   Personalized Prevention Plan  You are due for the preventive services outlined below.  Your care team is available to assist you in scheduling these services.  If you have already completed any of these items, please share that information with your care team to update in your medical record.  Health Maintenance Due   Topic Date Due    Depression Action Plan  Never done    RSV VACCINE (Pregnancy & 60+) (1 - 1-dose 60+ series) Never done    COVID-19 Vaccine (5 - 2023-24 season) 09/01/2023    ANNUAL REVIEW OF HM ORDERS  10/07/2023     Learning About Dietary Guidelines  What are the Dietary Guidelines for Americans?     Dietary Guidelines for Americans provide tips for eating well and staying healthy. This helps reduce the risk for long-term (chronic) diseases.  These guidelines recommend that you:  Eat and drink the right amount for you. The U.[place] government's food guide is called MyPlate. It can help you make your own well-balanced eating plan.  Try to balance your eating with your activity. This helps you stay at a healthy weight.  Drink alcohol in moderation, if at all.  Limit foods high in salt, saturated fat, trans fat, and added sugar.  These guidelines are from the U.S. Department of Agriculture and the U.S. Department of Health and Human Services. They are updated every 5 years.  What is MyPlate?  MyPlate is the U.[place] government's food guide. It can help you make your own well-balanced eating plan. A balanced eating plan means that you eat enough, but not too much, and that your food gives you the nutrients you need to stay healthy.  MyPlate focuses on eating plenty of whole grains, fruits, and vegetables, and on limiting fat and sugar. It is available online at www.ChooseMyPlate.gov.  How can you get started?  If you're trying to eat healthier, you can slowly change your eating habits over time. You don't have to make big changes all at once. Start by adding one or two healthy foods to your  "meals each day.  Grains  Choose whole-grain breads and cereals and whole-wheat pasta and whole-grain crackers.  Vegetables  Eat a variety of vegetables every day. They have lots of nutrients and are part of a heart-healthy diet.  Fruits  Eat a variety of fruits every day. Fruits contain lots of nutrients. Choose fresh fruit instead of fruit juice.  Protein foods  Choose fish and lean poultry more often. Eat red meat and fried meats less often. Dried beans, tofu, and nuts are also good sources of protein.  Dairy  Choose low-fat or fat-free products from this food group. If you have problems digesting milk, try eating cheese or yogurt instead.  Fats and oils  Limit fats and oils if you're trying to cut calories. Choose healthy fats when you cook. These include canola oil and olive oil.  Where can you learn more?  Go to https://www.[employer].net/patiented  Enter D676 in the search box to learn more about \"Learning About Dietary Guidelines.\"  Current as of: February 28, 2023               Content Version: 13.8    8513-9189 [employer].   Care instructions adapted under license by your healthcare professional. If you have questions about a medical condition or this instruction, always ask your healthcare professional. [employer] disclaims any warranty or liability for your use of this information.      Hearing Loss: Care Instructions  Overview     Hearing loss is a sudden or slow decrease in how well you hear. It can range from slight to profound. Permanent hearing loss can occur with aging. It also can happen when you are exposed long-term to loud noise. Examples include listening to loud music, riding motorcycles, or being around other loud machines.  Hearing loss can affect your work and home life. It can make you feel lonely or depressed. You may feel that you have lost your independence. But hearing aids and other devices can help you hear better and feel connected to others.  Follow-up " care is a key part of your treatment and safety. Be sure to make and go to all appointments, and call your doctor if you are having problems. It's also a good idea to know your test results and keep a list of the medicines you take.  How can you care for yourself at home?  Avoid loud noises whenever possible. This helps keep your hearing from getting worse.  Always wear hearing protection around loud noises.  Wear a hearing aid as directed.  A professional can help you pick a hearing aid that will work best for you.  You can also get hearing aids over the counter for mild to moderate hearing loss.  Have hearing tests as your doctor suggests. They can show whether your hearing has changed. Your hearing aid may need to be adjusted.  Use other devices as needed. These may include:  Telephone amplifiers and hearing aids that can connect to a television, stereo, radio, or microphone.  Devices that use lights or vibrations. These alert you to the doorbell, a ringing telephone, or a baby monitor.  Television closed-captioning. This shows the words at the bottom of the screen. Most new TVs can do this.  TTY (text telephone). This lets you type messages back and forth on the telephone instead of talking or listening. These devices are also called TDD. When messages are typed on the keyboard, they are sent over the phone line to a receiving TTY. The message is shown on a monitor.  Use text messaging, social media, and email if it is hard for you to communicate by telephone.  Try to learn a listening technique called speechreading. It is not lipreading. You pay attention to people's gestures, expressions, posture, and tone of voice. These clues can help you understand what a person is saying. Face the person you are talking to, and have them face you. Make sure the lighting is good. You need to see the other person's face clearly.  Think about counseling if you need help to adjust to your hearing loss.  When should you call  "for help?  Watch closely for changes in your health, and be sure to contact your doctor if:    You think your hearing is getting worse.     You have new symptoms, such as dizziness or nausea.   Where can you learn more?  Go to https://www.[employer].net/patiented  Enter R798 in the search box to learn more about \"Hearing Loss: Care Instructions.\"  Current as of: February 28, 2023               Content Version: 13.8    8809-1347 [employer].   Care instructions adapted under license by your healthcare professional. If you have questions about a medical condition or this instruction, always ask your healthcare professional. [employer] disclaims any warranty or liability for your use of this information.      Bladder Training: Care Instructions  Your Care Instructions     Bladder training is used to treat urge incontinence and stress incontinence. Urge incontinence means that the need to urinate comes on so fast that you can't get to a toilet in time. Stress incontinence means that you leak urine because of pressure on your bladder. For example, it may happen when you laugh, cough, or lift something heavy.  Bladder training can increase how long you can wait before you have to urinate. It can also help your bladder hold more urine. And it can give you better control over the urge to urinate.  It is important to remember that bladder training takes a few weeks to a few months to make a difference. You may not see results right away, but don't give up.  Follow-up care is a key part of your treatment and safety. Be sure to make and go to all appointments, and call your doctor if you are having problems. It's also a good idea to know your test results and keep a list of the medicines you take.  How can you care for yourself at home?  Work with your doctor to come up with a bladder training program that is right for you. You may use one or more of the following methods.  Delayed urination  In the " "beginning, try to keep from urinating for 5 minutes after you first feel the need to go.  While you wait, take deep, slow breaths to relax. Kegel exercises can also help you delay the need to go to the bathroom.  After some practice, when you can easily wait 5 minutes to urinate, try to wait 10 minutes before you urinate.  Slowly increase the waiting period until you are able to control when you have to urinate.  Scheduled urination  Empty your bladder when you first wake up in the morning.  Schedule times throughout the day when you will urinate.  Start by going to the bathroom every hour, even if you don't need to go.  Slowly increase the time between trips to the bathroom.  When you have found a schedule that works well for you, keep doing it.  If you wake up during the night and have to urinate, do it. Apply your schedule to waking hours only.  Kegel exercises  These tighten and strengthen pelvic muscles, which can help you control the flow of urine. (If doing these exercises causes pain, stop doing them and talk with your doctor.) To do Kegel exercises:  Squeeze your muscles as if you were trying not to pass gas. Or squeeze your muscles as if you were stopping the flow of urine. Your belly, legs, and buttocks shouldn't move.  Hold the squeeze for 3 seconds, then relax for 5 to 10 seconds.  Start with 3 seconds, then add 1 second each week until you are able to squeeze for 10 seconds.  Repeat the exercise 10 times a session. Do 3 to 8 sessions a day.  When should you call for help?  Watch closely for changes in your health, and be sure to contact your doctor if:    Your incontinence is getting worse.     You do not get better as expected.   Where can you learn more?  Go to https://www.[employer].net/patiented  Enter V684 in the search box to learn more about \"Bladder Training: Care Instructions.\"  Current as of: February 28, 2023               Content Version: 13.8    8729-2452 [employer].   Care " instructions adapted under license by your healthcare professional. If you have questions about a medical condition or this instruction, always ask your healthcare professional. [employer] disclaims any warranty or liability for your use of this information.          Overall everything looks fine!    You might try famotidine (Pepcid) 20 mg once or twice a day as NEEDED for heartburn, in place of omeprazole.     Okay to take a break from alendronate for a while--we are doing this routinely in patient's who've taken it for year.   Recommend scheduling an updated bone density test (baseline to compare in 2-3 years off of alendronate).    Refilled all other meds.   You might try over the counter Debrox drops (or half-strength hydrogen peroxide: mix equal parts of peroxide with warm tap water), a few drops in one or both ears, a few times a week.   If ears feel plugged still, could consider seeing ENT.     Will update labs.   Lab results will be available soon on [facility].    See me in a year, sooner if problems.

## 2023-12-05 ENCOUNTER — HOSPITAL ENCOUNTER (OUTPATIENT)
Dept: MAMMOGRAPHY | Facility: CLINIC | Age: 78
Discharge: HOME | End: 2023-12-05
Attending: INTERNAL MEDICINE | Admitting: INTERNAL MEDICINE
Payer: COMMERCIAL

## 2023-12-05 DIAGNOSIS — Z12.31 BREAST CANCER SCREENING BY MAMMOGRAM: ICD-10-CM

## 2023-12-05 PROCEDURE — 77067 SCR MAMMO BI INCL CAD: CPT

## 2023-12-19 ENCOUNTER — TRANSFERRED RECORDS (OUTPATIENT)
Dept: HEALTH INFORMATION MANAGEMENT | Facility: CLINIC | Age: 78
End: 2023-12-19
Payer: COMMERCIAL

## 2023-12-28 ENCOUNTER — TRANSFERRED RECORDS (OUTPATIENT)
Dept: HEALTH INFORMATION MANAGEMENT | Facility: CLINIC | Age: 78
End: 2023-12-28
Payer: COMMERCIAL

## 2024-01-19 ENCOUNTER — MYC MEDICAL ADVICE (OUTPATIENT)
Dept: INTERNAL MEDICINE | Facility: CLINIC | Age: 79
End: 2024-01-19
Payer: COMMERCIAL

## 2024-01-25 ENCOUNTER — MYC MEDICAL ADVICE (OUTPATIENT)
Dept: INTERNAL MEDICINE | Facility: CLINIC | Age: 79
End: 2024-01-25

## 2024-01-25 ENCOUNTER — E-VISIT (OUTPATIENT)
Dept: INTERNAL MEDICINE | Facility: CLINIC | Age: 79
End: 2024-01-25
Payer: COMMERCIAL

## 2024-01-25 DIAGNOSIS — M85.80 OSTEOPENIA, UNSPECIFIED LOCATION: Primary | ICD-10-CM

## 2024-01-25 DIAGNOSIS — Z78.0 ASYMPTOMATIC MENOPAUSAL STATE: ICD-10-CM

## 2024-01-25 PROCEDURE — 99207 PR NON-BILLABLE SERV PER CHARTING: CPT | Performed by: INTERNAL MEDICINE

## 2024-03-26 ENCOUNTER — TRANSFERRED RECORDS (OUTPATIENT)
Dept: HEALTH INFORMATION MANAGEMENT | Facility: CLINIC | Age: 79
End: 2024-03-26
Payer: COMMERCIAL

## 2024-03-26 LAB
ALT SERPL-CCNC: 18 IU/L (ref 5–35)
AST SERPL-CCNC: 28 U/L (ref 5–34)
CREATININE (EXTERNAL): 0.69 MG/DL (ref 0.5–1.3)

## 2024-04-17 ENCOUNTER — TRANSFERRED RECORDS (OUTPATIENT)
Dept: HEALTH INFORMATION MANAGEMENT | Facility: CLINIC | Age: 79
End: 2024-04-17
Payer: COMMERCIAL

## 2024-04-23 SDOH — HEALTH STABILITY: PHYSICAL HEALTH: ON AVERAGE, HOW MANY MINUTES DO YOU ENGAGE IN EXERCISE AT THIS LEVEL?: 50 MIN

## 2024-04-23 SDOH — HEALTH STABILITY: PHYSICAL HEALTH: ON AVERAGE, HOW MANY DAYS PER WEEK DO YOU ENGAGE IN MODERATE TO STRENUOUS EXERCISE (LIKE A BRISK WALK)?: 5 DAYS

## 2024-04-23 ASSESSMENT — SOCIAL DETERMINANTS OF HEALTH (SDOH)
IN A TYPICAL WEEK, HOW MANY TIMES DO YOU TALK ON THE PHONE WITH FAMILY, FRIENDS, OR NEIGHBORS?: MORE THAN THREE TIMES A WEEK
HOW OFTEN DO YOU ATTEND CHURCH OR RELIGIOUS SERVICES?: MORE THAN 4 TIMES PER YEAR
HOW OFTEN DO YOU GET TOGETHER WITH FRIENDS OR RELATIVES?: MORE THAN THREE TIMES A WEEK
HOW OFTEN DO YOU ATTENT MEETINGS OF THE CLUB OR ORGANIZATION YOU BELONG TO?: 1 TO 4 TIMES PER YEAR
DO YOU BELONG TO ANY CLUBS OR ORGANIZATIONS SUCH AS CHURCH GROUPS UNIONS, FRATERNAL OR ATHLETIC GROUPS, OR SCHOOL GROUPS?: YES

## 2024-04-23 ASSESSMENT — LIFESTYLE VARIABLES
HOW MANY STANDARD DRINKS CONTAINING ALCOHOL DO YOU HAVE ON A TYPICAL DAY: 1 OR 2
HOW OFTEN DO YOU HAVE A DRINK CONTAINING ALCOHOL: 4 OR MORE TIMES A WEEK
HOW OFTEN DO YOU HAVE SIX OR MORE DRINKS ON ONE OCCASION: NEVER
SKIP TO QUESTIONS 9-10: 1
AUDIT-C TOTAL SCORE: 4

## 2024-04-29 ENCOUNTER — OFFICE VISIT (OUTPATIENT)
Dept: FAMILY MEDICINE | Facility: CLINIC | Age: 79
End: 2024-04-29
Payer: COMMERCIAL

## 2024-04-29 VITALS
WEIGHT: 146 LBS | OXYGEN SATURATION: 97 % | BODY MASS INDEX: 24.32 KG/M2 | TEMPERATURE: 97.5 F | RESPIRATION RATE: 16 BRPM | HEIGHT: 65 IN | HEART RATE: 76 BPM | DIASTOLIC BLOOD PRESSURE: 76 MMHG | SYSTOLIC BLOOD PRESSURE: 135 MMHG

## 2024-04-29 DIAGNOSIS — M20.5X1 CLAW TOE, ACQUIRED, RIGHT: ICD-10-CM

## 2024-04-29 DIAGNOSIS — M05.79 SEROPOSITIVE RHEUMATOID ARTHRITIS OF MULTIPLE SITES (H): ICD-10-CM

## 2024-04-29 DIAGNOSIS — Z01.818 PREOP GENERAL PHYSICAL EXAM: Primary | ICD-10-CM

## 2024-04-29 PROBLEM — K52.9 GASTROENTERITIS: Status: RESOLVED | Noted: 2019-03-28 | Resolved: 2024-04-29

## 2024-04-29 PROBLEM — M35.00 SICCA SYNDROME (H): Status: RESOLVED | Noted: 2020-11-27 | Resolved: 2024-04-29

## 2024-04-29 PROBLEM — E78.5 HYPERLIPIDEMIA: Status: ACTIVE | Noted: 2017-06-21

## 2024-04-29 LAB — HGB BLD-MCNC: 12.5 G/DL (ref 11.7–15.7)

## 2024-04-29 PROCEDURE — 93000 ELECTROCARDIOGRAM COMPLETE: CPT | Performed by: PHYSICIAN ASSISTANT

## 2024-04-29 PROCEDURE — 85018 HEMOGLOBIN: CPT | Performed by: PHYSICIAN ASSISTANT

## 2024-04-29 PROCEDURE — 99213 OFFICE O/P EST LOW 20 MIN: CPT | Performed by: PHYSICIAN ASSISTANT

## 2024-04-29 PROCEDURE — 36415 COLL VENOUS BLD VENIPUNCTURE: CPT | Performed by: PHYSICIAN ASSISTANT

## 2024-04-29 RX ORDER — PREDNISONE 5 MG/ML
5 SOLUTION ORAL DAILY
COMMUNITY
Start: 2024-03-01

## 2024-04-29 RX ORDER — RESPIRATORY SYNCYTIAL VIRUS VACCINE 120MCG/0.5
0.5 KIT INTRAMUSCULAR ONCE
Qty: 1 EACH | Refills: 0 | Status: CANCELLED | OUTPATIENT
Start: 2024-04-29 | End: 2024-04-29

## 2024-04-29 RX ORDER — LEFLUNOMIDE 20 MG/1
20 TABLET ORAL EVERY OTHER DAY
COMMUNITY
Start: 2024-03-12

## 2024-04-29 NOTE — PROGRESS NOTES
Preoperative Evaluation  [facility]  [address] [address] [address]  Phone: [phone]  Primary Provider: Gene Scott  Pre-op Performing Provider: SIMON SOSA  Apr 29, 2024       Emma is a 78 year old, presenting for the following:  Pre-Op Exam        4/29/2024    10:41 AM   Additional Questions   Roomed by Mary     Surgical Information  Surgery/Procedure: R 3rd and 4th claw toe reconstruction  Surgery Location: [facility]  Surgeon: Fransico Rodriguez  Surgery Date: 5/2/2024  Time of Surgery: 1:00 pm  Where patient plans to recover: At home with family  Fax number for surgical facility: [phone]    Assessment & Plan     The proposed surgical procedure is considered INTERMEDIATE risk.    Preop general physical exam    Cleared for surgery.    - Hemoglobin; Future  - EKG 12-lead complete w/read - Clinics  - Hemoglobin      Seropositive rheumatoid arthritis of multiple sites (H)    Surgery planned to fix below deformity as result of RA.      Claw toe, acquired, right    Surgery planned.          - No identified additional risk factors other than previously addressed    Antiplatelet or Anticoagulation Medication Instructions   - Patient is on no antiplatelet or anticoagulation medications.    Additional Medication Instructions   - ACE/ARB: HOLD on day of surgery (minimum 11 hours for general anesthesia).   - Beta Blockers: Continue taking on the day of surgery.   - SSRIs, SNRIs, TCAs, Antipsychotics: Continue without modification.    - Intraoperative stress dose steroids may be indicated due to chronic steroid use in the last 3 months (e.g. > 3 weeks of prednisone 20 mg or daily prednisone 5 mg).      Recommendation  APPROVAL GIVEN to proceed with proposed procedure, without further diagnostic evaluation.      Subjective       HPI related to upcoming procedure: rheumatoid arthritis- claw toe deformity        4/23/2024     8:08 AM   Preop Questions    1. Have you ever had a heart attack or stroke? No   2. Have you ever had surgery on your heart or blood vessels, such as a stent placement, a coronary artery bypass, or surgery on an artery in your head, neck, heart, or legs? No   3. Do you have chest pain with activity? No   4. Do you have a history of  heart failure? No   5. Do you currently have a cold, bronchitis or symptoms of other infection? No   6. Do you have a cough, shortness of breath, or wheezing? No   7. Do you or anyone in your family have previous history of blood clots? No   8. Do you or does anyone in your family have a serious bleeding problem such as prolonged bleeding following surgeries or cuts? No   9. Have you ever had problems with anemia or been told to take iron pills? No   10. Have you had any abnormal blood loss such as black, tarry or bloody stools, or abnormal vaginal bleeding? No   11. Have you ever had a blood transfusion? No   12. Are you willing to have a blood transfusion if it is medically needed before, during, or after your surgery? Yes   13. Have you or any of your relatives ever had problems with anesthesia? No   14. Do you have sleep apnea, excessive snoring or daytime drowsiness? No   15. Do you have any artifical heart valves or other implanted medical devices like a pacemaker, defibrillator, or continuous glucose monitor? No   16. Do you have artificial joints? YES - hands bilateral- fusions   17. Are you allergic to latex? No       Health Care Directive  Patient does not have a Health Care Directive or Living Will: Discussed advance care planning with patient; however, patient declined at this time.    Preoperative Review of    reviewed - no record of controlled substances prescribed.      Status of Chronic Conditions:  DEPRESSION - Patient has a long history of Depression of moderate severity requiring medication for control with recent symptoms being stable..Current symptoms of depression include none.  "    HYPERLIPIDEMIA - Patient has a long history of significant Hyperlipidemia requiring medication for treatment with recent good control. Patient reports no problems or side effects with the medication.     HYPERTENSION - Patient has longstanding history of HTN , currently denies any symptoms referable to elevated blood pressure. Specifically denies chest pain, palpitations, dyspnea, orthopnea, PND or peripheral edema. Blood pressure readings have been in normal range. Current medication regimen is as listed below. Patient denies any side effects of medication.     Patient Active Problem List    Diagnosis Date Noted    Anxiety 10/03/2018     Priority: Medium    Major depressive disorder, recurrent episode, in full remission (H24) 07/25/2018     Priority: Medium    Hyperlipidemia 06/21/2017     Priority: Medium    Seropositive rheumatoid arthritis of multiple sites (H) 10/17/2016     Priority: Medium    Gastroesophageal reflux disease 06/30/2015     Priority: Medium    Hypertensive disorder 06/30/2015     Priority: Medium      Past Medical History:   Diagnosis Date    Anxiety and depression     Arrhythmia     Arthritis     rheumatoid arthritis    Depressive disorder     GERD (gastroesophageal reflux disease)     Heart arrhythmias     Hypertensive disorder 6/30/2015    Irregular heart beat     \"fast heart beat when sleeping\"    Mumps     Palpitations      Past Surgical History:   Procedure Laterality Date    APPENDECTOMY      BLADDER SURGERY      BLEPHAROPLASTY BILATERAL Bilateral 01/09/2017    Procedure: BLEPHAROPLASTY BILATERAL;  Surgeon: Ismael Holt MD;  Location:  [facility]    COLONOSCOPY  06/04/2013    Adenomatous polyps, repeat in 5 years    COLONOSCOPY N/A 11/09/2018    3 polyps. Procedure: colonoscopy with polypectomy;  Surgeon: Nallely Alaniz MD;  Location:  OR    COLONOSCOPY N/A 01/12/2023    No polyps. Procedure: COLONOSCOPY;  Surgeon: Shirlene Cheung MD;  Location:  GI    GYN SURGERY   "    hysterectomy    HYSTERECTOMY, PAP NO LONGER INDICATED      ORTHOPEDIC SURGERY      REMOVE HARDWARE HAND  10/07/2011    Procedure:REMOVE HARDWARE HAND; Right Index Finger K-Wire Removal ; Surgeon:KELSEY ALAS; Location: GI     Current Outpatient Medications   Medication Sig Dispense Refill    citalopram (CELEXA) 20 MG tablet TAKE ONE TABLET BY MOUTH ONCE DAILY 90 tablet 3    etanercept (ENBREL) 50 MG/ML injection Inject 50 mg Subcutaneous once a week On       leflunomide (ARAVA) 20 MG tablet Take 20 mg by mouth every other day      losartan (COZAAR) 50 MG tablet Take 1 tablet (50 mg) by mouth daily 90 tablet 3    meclizine (ANTIVERT) 25 MG tablet Take 1 tablet (25 mg) by mouth 3 times daily as needed for dizziness 30 tablet 3    metoprolol tartrate (LOPRESSOR) 50 MG tablet TAKE ONE-HALF TABLET BY MOUTH EVERY MORNING AND ONE TABLET EVERY NIGHT AT BEDTIME 135 tablet 3    Omega-3 Fatty Acids (OMEGA-3 FISH OIL PO) Take 1 g by mouth daily      omeprazole (PRILOSEC) 40 MG DR capsule TAKE ONE CAPSULE BY MOUTH ONCE DAILY, 30-60 MINUTES BEFORE EATING 90 capsule 3    pravastatin (PRAVACHOL) 10 MG tablet Take 1 tablet (10 mg) by mouth daily 90 tablet 3    predniSONE (DELTASONE) 5 MG/5ML solution Take 5 mg by mouth daily      traZODone (DESYREL) 50 MG tablet TAKE ONE TABLET BY MOUTH AT BEDTIME 90 tablet 3    polyethylene glycol (MIRALAX/GLYCOLAX) Packet Take 1 packet by mouth daily         No Known Allergies     Social History     Tobacco Use    Smoking status: Former     Current packs/day: 0.00     Average packs/day: 0.5 packs/day for 10.0 years (5.0 ttl pk-yrs)     Types: Cigarettes     Start date: 1965     Quit date: 1975     Years since quittin.0    Smokeless tobacco: Never    Tobacco comments:     Would smoke in social settings   Substance Use Topics    Alcohol use: Yes     Alcohol/week: 4.0 standard drinks of alcohol     Types: 4 Glasses of wine per week     Comment: Approximately  "4 glasses per week     Family History   Problem Relation Age of Onset    Colon Cancer Mother 79         from colon cancer    Depression Father          age 94    Anxiety Disorder Father     Depression Sister         Born 1941    Anxiety Disorder Sister     Colon Cancer Brother 71        Born 1940. Had colon CA, cancer-free for 5+ years. B    Anxiety Disorder Brother         Born 1950, back issues     History   Drug Use No         Review of Systems    Review of Systems  CONSTITUTIONAL: NEGATIVE for fever, chills, change in weight  INTEGUMENTARY/SKIN: NEGATIVE for worrisome rashes, moles or lesions  EYES: NEGATIVE for vision changes or irritation  ENT/MOUTH: NEGATIVE for ear, mouth and throat problems  RESP: NEGATIVE for significant cough or SOB  BREAST: NEGATIVE for masses, tenderness or discharge  CV: NEGATIVE for chest pain, palpitations or peripheral edema  GI: NEGATIVE for nausea, abdominal pain, heartburn, or change in bowel habits  : NEGATIVE for frequency, dysuria, or hematuria  MUSCULOSKELETAL: NEGATIVE for significant arthralgias or myalgia  NEURO: NEGATIVE for weakness, dizziness or paresthesias  ENDOCRINE: NEGATIVE for temperature intolerance, skin/hair changes  HEME: NEGATIVE for bleeding problems  PSYCHIATRIC: NEGATIVE for changes in mood or affect      Objective    /76 (BP Location: Right arm, Patient Position: Chair, Cuff Size: Adult Regular)   Pulse 76   Temp 97.5  F (36.4  C) (Oral)   Resp 16   Ht 1.651 m (5' 5\")   Wt 66.2 kg (146 lb)   LMP  (LMP Unknown)   SpO2 97%   BMI 24.30 kg/m     Estimated body mass index is 24.3 kg/m  as calculated from the following:    Height as of this encounter: 1.651 m (5' 5\").    Weight as of this encounter: 66.2 kg (146 lb).      Physical Exam  GENERAL: alert and no distress  EYES: Eyes grossly normal to inspection, PERRL and conjunctivae and sclerae normal  HENT: ear canals and TM's normal, nose and mouth without ulcers or lesions  RESP: lungs " clear to auscultation - no rales, rhonchi or wheezes  CV: regular rate and rhythm, normal S1 S2, no S3 or S4, no murmur, click or rub, no peripheral edema  ABDOMEN: soft, nontender, no hepatosplenomegaly, no masses and bowel sounds normal  MS: no gross musculoskeletal defects noted, no edema  SKIN: no suspicious lesions or rashes  NEURO: Normal strength and tone, mentation intact and speech normal  PSYCH: mentation appears normal, affect normal/bright      Recent Labs   Lab Test 11/28/23  1235 10/07/22  1050   HGB 12.9 12.6    226   * 138   POTASSIUM 4.6 4.1   CR 0.72 0.71        Diagnostics  Recent Results (from the past 24 hour(s))   Hemoglobin    Collection Time: 04/29/24 11:23 AM   Result Value Ref Range    Hemoglobin 12.5 11.7 - 15.7 g/dL      EKG: appears normal, NSR, sinus bradycardia (rate 58), normal axis, normal intervals, no acute ST/T changes c/w ischemia, no LVH by voltage criteria, unchanged from previous tracings    Revised Cardiac Risk Index (RCRI)  The patient has the following serious cardiovascular risks for perioperative complications:   - No serious cardiac risks = 0 points     RCRI Interpretation: 0 points: Class I (very low risk - 0.4% complication rate)         Signed Electronically by: Duane Bateman PA-C  Copy of this evaluation report is provided to requesting physician.

## 2024-04-29 NOTE — PATIENT INSTRUCTIONS
Preparing for Your Surgery  Getting started  A nurse will call you to review your health history and instructions. They will give you an arrival time based on your scheduled surgery time. Please be ready to share:  Your doctor's clinic name and phone number  Your medical, surgical, and anesthesia history  A list of allergies and sensitivities  A list of medicines, including herbal treatments and over-the-counter drugs  Whether the patient has a legal guardian (ask how to send us the papers in advance)  Please tell us if you're pregnant--or if there's any chance you might be pregnant. Some surgeries may injure a fetus (unborn baby), so they require a pregnancy test. Surgeries that are safe for a fetus don't always need a test, and you can choose whether to have one.   If you have a child who's having surgery, please ask for a copy of Preparing for Your Child's Surgery.    Preparing for surgery  Within 10 to 30 days of surgery: Have a pre-op exam (sometimes called an H&P, or History and Physical). This can be done at a clinic or pre-operative center.  If you're having a , you may not need this exam. Talk to your care team.  At your pre-op exam, talk to your care team about all medicines you take. If you need to stop any medicines before surgery, ask when to start taking them again.  We do this for your safety. Many medicines can make you bleed too much during surgery. Some change how well surgery (anesthesia) drugs work.  Call your insurance company to let them know you're having surgery. (If you don't have insurance, call [phone].)  Call your clinic if there's any change in your health. This includes signs of a cold or flu (sore throat, runny nose, cough, rash, fever). It also includes a scrape or scratch near the surgery site.  If you have questions on the day of surgery, call your hospital or surgery center.  Eating and drinking guidelines  For your safety: Unless your surgeon tells you otherwise,  follow the guidelines below.  Eat and drink as usual until 8 hours before you arrive for surgery. After that, no food or milk.  Drink clear liquids until 2 hours before you arrive. These are liquids you can see through, like water, Gatorade, and Propel Water. They also include plain black coffee and tea (no cream or milk), candy, and breath mints. You can spit out gum when you arrive.  If you drink alcohol: Stop drinking it the night before surgery.  If your care team tells you to take medicine on the morning of surgery, it's okay to take it with a sip of water.  Preventing infection  Shower or bathe the night before and morning of your surgery. Follow the instructions your clinic gave you. (If no instructions, use regular soap.)  Don't shave or clip hair near your surgery site. We'll remove the hair if needed.  Don't smoke or vape the morning of surgery. You may chew nicotine gum up to 2 hours before surgery. A nicotine patch is okay.  Note: Some surgeries require you to completely quit smoking and nicotine. Check with your surgeon.  Your care team will make every effort to keep you safe from infection. We will:  Clean our hands often with soap and water (or an alcohol-based hand rub).  Clean the skin at your surgery site with a special soap that kills germs.  Give you a special gown to keep you warm. (Cold raises the risk of infection.)  Wear special hair covers, masks, gowns and gloves during surgery.  Give antibiotic medicine, if prescribed. Not all surgeries need antibiotics.  What to bring on the day of surgery  Photo ID and insurance card  Copy of your health care directive, if you have one  Glasses and hearing aids (bring cases)  You can't wear contacts during surgery  Inhaler and eye drops, if you use them (tell us about these when you arrive)  CPAP machine or breathing device, if you use them  A few personal items, if spending the night  If you have . . .  A pacemaker, ICD (cardiac defibrillator) or other  implant: Bring the ID card.  An implanted stimulator: Bring the remote control.  A legal guardian: Bring a copy of the certified (court-stamped) guardianship papers.  Please remove any jewelry, including body piercings. Leave jewelry and other valuables at home.  If you're going home the day of surgery  You must have a responsible adult drive you home. They should stay with you overnight as well.  If you don't have someone to stay with you, and you aren't safe to go home alone, we may keep you overnight. Insurance often won't pay for this.  After surgery  If it's hard to control your pain or you need more pain medicine, please call your surgeon's office.  Questions?   If you have any questions for your care team, list them here: _________________________________________________________________________________________________________________________________________________________________________ ____________________________________ ____________________________________ ____________________________________  For informational purposes only. Not to replace the advice of your health care provider. Copyright   2003, 2019 [employer]. All rights reserved. Clinically reviewed by Tiffany Valdez MD. [employer] 932173 - REV 12/22.    How to Take Your Medication Before Surgery  - HOLD (do not take) Losartan the morning of surgery  STOP taking fish oil now

## 2024-05-06 ENCOUNTER — TRANSFERRED RECORDS (OUTPATIENT)
Dept: HEALTH INFORMATION MANAGEMENT | Facility: CLINIC | Age: 79
End: 2024-05-06
Payer: COMMERCIAL

## 2024-05-15 ENCOUNTER — TRANSFERRED RECORDS (OUTPATIENT)
Dept: HEALTH INFORMATION MANAGEMENT | Facility: CLINIC | Age: 79
End: 2024-05-15
Payer: COMMERCIAL

## 2024-05-22 ENCOUNTER — TRANSFERRED RECORDS (OUTPATIENT)
Dept: HEALTH INFORMATION MANAGEMENT | Facility: CLINIC | Age: 79
End: 2024-05-22
Payer: COMMERCIAL

## 2024-05-29 ENCOUNTER — TRANSFERRED RECORDS (OUTPATIENT)
Dept: HEALTH INFORMATION MANAGEMENT | Facility: CLINIC | Age: 79
End: 2024-05-29
Payer: COMMERCIAL

## 2024-06-27 ENCOUNTER — TELEPHONE (OUTPATIENT)
Dept: INTERNAL MEDICINE | Facility: CLINIC | Age: 79
End: 2024-06-27
Payer: COMMERCIAL

## 2024-06-27 NOTE — TELEPHONE ENCOUNTER
Reason for Call:  Appointment Request    Patient requesting this type of appt:  office visit    Requested provider: Gene Scott    Reason patient unable to be scheduled: Not within requested timeframe    When does patient want to be seen/preferred time: 3-7 days    Comments: Patient has had a persistent cough for two months and would like to see primary    Could we send this information to you in [employer] or would you prefer to receive a phone call?:   Patient would like to be contacted via [employer]    Call taken on 6/27/2024 at 4:35 PM by Toshia Durán

## 2024-07-03 ENCOUNTER — MYC MEDICAL ADVICE (OUTPATIENT)
Dept: INTERNAL MEDICINE | Facility: CLINIC | Age: 79
End: 2024-07-03
Payer: COMMERCIAL

## 2024-07-03 NOTE — TELEPHONE ENCOUNTER
Sent [employer] message to patient that there currently is a 9:40 or 10:10 am arrival times available on 7/5/2024 and asked patient to let us know if either works.    If patient calls back or sends [employer] message please assist in scheduling patient in either of those slots or with acute provider if those are not available.

## 2024-07-03 NOTE — TELEPHONE ENCOUNTER
Sent [employer] message to patient. Scheduled appointment.    Appointments in Next Year      Jul 05, 2024 10:00 AM  (Arrive by 9:40 AM)  Provider Visit with Gene Scott MD  [facility] ([facility] ) [phone]              Thank you,  Ernie Garcia, Triage RN [facility]  9:01 AM 7/3/2024

## 2024-07-04 ASSESSMENT — PATIENT HEALTH QUESTIONNAIRE - PHQ9
SUM OF ALL RESPONSES TO PHQ QUESTIONS 1-9: 1
SUM OF ALL RESPONSES TO PHQ QUESTIONS 1-9: 1

## 2024-07-05 ENCOUNTER — OFFICE VISIT (OUTPATIENT)
Dept: INTERNAL MEDICINE | Facility: CLINIC | Age: 79
End: 2024-07-05
Payer: COMMERCIAL

## 2024-07-05 VITALS
DIASTOLIC BLOOD PRESSURE: 68 MMHG | BODY MASS INDEX: 24.86 KG/M2 | RESPIRATION RATE: 18 BRPM | HEIGHT: 65 IN | SYSTOLIC BLOOD PRESSURE: 160 MMHG | TEMPERATURE: 95.6 F | OXYGEN SATURATION: 90 % | WEIGHT: 149.2 LBS | HEART RATE: 74 BPM

## 2024-07-05 DIAGNOSIS — R05.9 COUGH, UNSPECIFIED TYPE: ICD-10-CM

## 2024-07-05 DIAGNOSIS — I10 ESSENTIAL HYPERTENSION: ICD-10-CM

## 2024-07-05 DIAGNOSIS — R06.02 SHORTNESS OF BREATH: Primary | ICD-10-CM

## 2024-07-05 PROCEDURE — 99214 OFFICE O/P EST MOD 30 MIN: CPT | Performed by: INTERNAL MEDICINE

## 2024-07-05 PROCEDURE — G2211 COMPLEX E/M VISIT ADD ON: HCPCS | Performed by: INTERNAL MEDICINE

## 2024-07-05 ASSESSMENT — ENCOUNTER SYMPTOMS: COUGH: 1

## 2024-07-05 ASSESSMENT — PAIN SCALES - GENERAL: PAINLEVEL: NO PAIN (0)

## 2024-07-05 NOTE — PROGRESS NOTES
"Face to face time with patient: 34 minutes (1039---1113)    Assessment & Plan   (R06.02) Shortness of breath  (primary encounter diagnosis)  Comment: Discussed potential causes including CHF or obstructive lung disease. See pt instructions and epic orders.   Plan: General PFT Lab (Please always keep checked),         Echocardiogram Complete          (R05.9) Cough, unspecified type  Comment: See pt instructions and epic orders.   Plan: General PFT Lab (Please always keep checked),         Echocardiogram Complete          (I10) Essential hypertension  Comment: BP elevated today. No med changes made.       The longitudinal plan of care for the diagnosis(es)/condition(s) as documented were addressed during this visit. Due to the added complexity in care, I will continue to support Emma in the subsequent management and with ongoing continuity of care.        Patient Instructions   The cough is on the verge of being defined as a \"chronic cough\" (greater than six weeks).   We will order two tests to investigate for possible causes of chronic cough/shortness of breath:    Lung function tests (rule out asthma or COPD/emphysema)  Heart ultrasound (echocardiogram): rule out heart failure.     We can discuss Asthma/COPD treatments IF the lung function tests point in that direction (usually involves a prednisone-like inhaler for prevention, and occasional use of \"rescue\" inhaler as was prescribed in Urgent Care).     Hopefully BP elevation is temporary and at least in part due to prednisone.     See me for your Annual Wellness Visit in December 2024. We could consider an earlier visit if cough/shortness of breath remains a problem or if test results create confusion.     Laurita Carter is a 78 year old, presenting for the following health issues:  Cough      7/5/2024     9:31 AM   Additional Questions   Roomed by Roula Iniguez     Cough    History of Present Illness       Reason for visit:  Followup from Urgent care in " "[place]  Symptom onset:  More than a month  Symptoms include:  Persistent cough shortness of breath  Symptom intensity:  Moderate  Symptom progression:  Improving  Had these symptoms before:  Allie consumes 3 sweetened beverage(s) daily. She exercises with enough effort to increase her heart rate 3 or less days per week.   She is taking medications regularly.         The patient saw Eunice Padilla MD at [facility] in [place] on 7/2/2024. She prescribed a short course of prednisone and a Combivent Respimat inhaler, and suggested that the patient see her primary care provider to \"discuss your lungs and do tests for COPD or asthma\".     She has noted persistent dry cough with dyspnea since the end of May or early June.   She had recovered completely from any respiratory symptoms from having Covid-19 in January.   She is in the midst of a slow prednisone taper prescribed by Dr Torres of Rheumatology due to a flare of rheumatoid arthritis.    Past medical, family and social histories as well as medications reviewed and updated as needed.    REVIEW OF SYSTEMS: The following systems have been completely reviewed and are negative except as noted above:   Constitutional, HEENT, respiratory, cardiovascular, gastrointestinal, musculoskeletal, dermatologic, endocrine, psychiatric, and neurologic systems.        Objective    Resp 18   Ht 1.651 m (5' 5\")   LMP  (LMP Unknown)   Breastfeeding No   BMI 24.30 kg/m    Body mass index is 24.3 kg/m .    Physical Exam   GENERAL: alert and no distress  EYES: Eyes grossly normal to inspection, PERRL and conjunctivae and sclerae normal  HENT: ear canals and TM's normal, nose and mouth without ulcers or lesions  RESP: lungs clear to auscultation - no rales, rhonchi or wheezes  CV: regular rate and rhythm, normal S1 S2, no S3 or S4, no murmur, click or rub, no peripheral edema  MS: no gross musculoskeletal defects noted, no edema  NEURO: Normal strength and tone, " mentation intact and speech normal  PSYCH: mentation appears normal, affect normal/bright            Signed Electronically by: Gene Scott MD,

## 2024-07-05 NOTE — PATIENT INSTRUCTIONS
"The cough is on the verge of being defined as a \"chronic cough\" (greater than six weeks).   We will order two tests to investigate for possible causes of chronic cough/shortness of breath:    Lung function tests (rule out asthma or COPD/emphysema)  Heart ultrasound (echocardiogram): rule out heart failure.     We can discuss Asthma/COPD treatments IF the lung function tests point in that direction (usually involves a prednisone-like inhaler for prevention, and occasional use of \"rescue\" inhaler as was prescribed in Urgent Care).     Hopefully BP elevation is temporary and at least in part due to prednisone.     See me for your Annual Wellness Visit in December 2024. We could consider an earlier visit if cough/shortness of breath remains a problem or if test results create confusion.   "

## 2024-07-31 ENCOUNTER — HOSPITAL ENCOUNTER (OUTPATIENT)
Dept: CARDIOLOGY | Facility: CLINIC | Age: 79
Discharge: HOME | End: 2024-07-31
Attending: INTERNAL MEDICINE | Admitting: INTERNAL MEDICINE
Payer: COMMERCIAL

## 2024-07-31 DIAGNOSIS — R06.02 SHORTNESS OF BREATH: ICD-10-CM

## 2024-07-31 DIAGNOSIS — R05.9 COUGH, UNSPECIFIED TYPE: ICD-10-CM

## 2024-07-31 LAB — LVEF ECHO: NORMAL

## 2024-07-31 PROCEDURE — 93306 TTE W/DOPPLER COMPLETE: CPT | Mod: 26 | Performed by: INTERNAL MEDICINE

## 2024-07-31 PROCEDURE — 93306 TTE W/DOPPLER COMPLETE: CPT

## 2024-08-12 ENCOUNTER — MYC MEDICAL ADVICE (OUTPATIENT)
Dept: INTERNAL MEDICINE | Facility: CLINIC | Age: 79
End: 2024-08-12
Payer: COMMERCIAL

## 2024-08-22 ENCOUNTER — TRANSFERRED RECORDS (OUTPATIENT)
Dept: HEALTH INFORMATION MANAGEMENT | Facility: CLINIC | Age: 79
End: 2024-08-22
Payer: COMMERCIAL

## 2024-08-23 ENCOUNTER — OFFICE VISIT (OUTPATIENT)
Dept: INTERNAL MEDICINE | Facility: CLINIC | Age: 79
End: 2024-08-23
Payer: COMMERCIAL

## 2024-08-23 VITALS
SYSTOLIC BLOOD PRESSURE: 163 MMHG | HEART RATE: 83 BPM | TEMPERATURE: 97.6 F | DIASTOLIC BLOOD PRESSURE: 98 MMHG | HEIGHT: 65 IN | RESPIRATION RATE: 16 BRPM | BODY MASS INDEX: 25.12 KG/M2 | WEIGHT: 150.8 LBS | OXYGEN SATURATION: 95 %

## 2024-08-23 DIAGNOSIS — R05.3 CHRONIC COUGH: Primary | ICD-10-CM

## 2024-08-23 DIAGNOSIS — R06.02 SOB (SHORTNESS OF BREATH): ICD-10-CM

## 2024-08-23 PROCEDURE — 99214 OFFICE O/P EST MOD 30 MIN: CPT

## 2024-08-23 NOTE — PROGRESS NOTES
"  Assessment & Plan     Chronic cough  Patient presents to the clinic for chief complaint of a chronic cough.  Patient states she has been treated with albuterol and prednisone that did not help.  Patient did get an echocardiogram where we were able to rule out any cardiac abnormality.  At this time I will place a order for a chest CT scan and a general PFT.  I will also refer patient to pulmonary.  - General PFT Lab (Please always keep checked); Future  - CT Chest w/o Contrast; Future    SOB (shortness of breath)  Patient presents to the clinic for chief complaint of shortness of breath.  Will place an order for chest CT scan.  Patient will undergo PFT testing to rule out asthma and COPD.  Given her symptoms clinical picture suggest possible COPD.  Will send patient to pulmonology for further evaluation.  - CT Chest w/o Contrast; Future      30 minutes spent by me on the date of the encounter doing chart review, review of test results, interpretation of tests, patient visit, and documentation       BMI  Estimated body mass index is 25.09 kg/m  as calculated from the following:    Height as of this encounter: 1.651 m (5' 5\").    Weight as of this encounter: 68.4 kg (150 lb 12.8 oz).   Weight management plan: Patient was referred to their PCP to discuss a diet and exercise plan.          Laurita Carter is a 78 year old, presenting for the following health issues:  Patient has been dealing with a cough for 3-6 months now.   She went to her rheumatologist last week and was taken off her lefunomide due to a cough. Could be a side effect of the medication.   She wants my opinion and her rheumatologist would like us to order a chest CT scan and refer her to a lung specialist.       Chronic Cough        8/23/2024    10:45 AM   Additional Questions   Roomed by azamit   Accompanied by self         8/23/2024    10:45 AM   Patient Reported Additional Medications   Patient reports taking the following new medications none " "    HPI               Review of Systems  Constitutional, HEENT, cardiovascular, pulmonary, gi and gu systems are negative, except as otherwise noted.      Objective    BP (!) 163/98 (BP Location: Left arm, Patient Position: Sitting, Cuff Size: Adult Regular)   Pulse 83   Temp 97.6  F (36.4  C) (Oral)   Resp 16   Ht 1.651 m (5' 5\")   Wt 68.4 kg (150 lb 12.8 oz)   LMP  (LMP Unknown)   SpO2 95%   BMI 25.09 kg/m    Body mass index is 25.09 kg/m .  Physical Exam   GENERAL: alert and no distress  NECK: no adenopathy, no asymmetry, masses, or scars  RESP: lungs clear to auscultation - no rales, rhonchi or wheezes  CV: regular rate and rhythm, normal S1 S2, no S3 or S4, no murmur, click or rub, no peripheral edema  ABDOMEN: soft, nontender, no hepatosplenomegaly, no masses and bowel sounds normal  MS: no gross musculoskeletal defects noted, no edema            Signed Electronically by: GLORIA Camara CNP    "

## 2024-10-03 ENCOUNTER — TRANSFERRED RECORDS (OUTPATIENT)
Dept: HEALTH INFORMATION MANAGEMENT | Facility: CLINIC | Age: 79
End: 2024-10-03
Payer: COMMERCIAL

## 2024-10-15 ENCOUNTER — TRANSFERRED RECORDS (OUTPATIENT)
Dept: HEALTH INFORMATION MANAGEMENT | Facility: CLINIC | Age: 79
End: 2024-10-15
Payer: COMMERCIAL

## 2024-10-15 LAB
ALT SERPL-CCNC: 18 IU/L (ref 6–32)
AST SERPL-CCNC: 25 U/L (ref 10–35)
CREATININE (EXTERNAL): 0.7 MG/DL (ref 0.5–1.05)

## 2024-10-22 ENCOUNTER — PATIENT OUTREACH (OUTPATIENT)
Dept: INTERNAL MEDICINE | Facility: CLINIC | Age: 79
End: 2024-10-22

## 2024-12-09 ENCOUNTER — HOSPITAL ENCOUNTER (OUTPATIENT)
Dept: MAMMOGRAPHY | Facility: CLINIC | Age: 79
Discharge: HOME | End: 2024-12-09
Attending: INTERNAL MEDICINE | Admitting: INTERNAL MEDICINE
Payer: COMMERCIAL

## 2024-12-09 DIAGNOSIS — Z12.31 VISIT FOR SCREENING MAMMOGRAM: ICD-10-CM

## 2024-12-09 PROCEDURE — 77067 SCR MAMMO BI INCL CAD: CPT

## 2024-12-09 PROCEDURE — 77063 BREAST TOMOSYNTHESIS BI: CPT

## 2025-01-21 ENCOUNTER — TRANSFERRED RECORDS (OUTPATIENT)
Dept: HEALTH INFORMATION MANAGEMENT | Facility: CLINIC | Age: 80
End: 2025-01-21
Payer: COMMERCIAL

## 2025-01-21 LAB
ALT SERPL-CCNC: 14 IU/L (ref 6–32)
AST SERPL-CCNC: 23 U/L (ref 10–35)
CREATININE (EXTERNAL): 0.8 MG/DL (ref 0.5–1.05)

## 2025-02-03 DIAGNOSIS — R00.0 TACHYCARDIA: ICD-10-CM

## 2025-02-03 DIAGNOSIS — E78.5 HYPERLIPIDEMIA LDL GOAL <130: ICD-10-CM

## 2025-02-03 DIAGNOSIS — I10 ESSENTIAL HYPERTENSION: ICD-10-CM

## 2025-02-03 DIAGNOSIS — G47.00 INSOMNIA, UNSPECIFIED TYPE: ICD-10-CM

## 2025-02-03 RX ORDER — PRAVASTATIN SODIUM 10 MG
10 TABLET ORAL DAILY
Qty: 90 TABLET | Refills: 0 | OUTPATIENT
Start: 2025-02-03

## 2025-02-03 RX ORDER — TRAZODONE HYDROCHLORIDE 50 MG/1
TABLET ORAL
Qty: 90 TABLET | Refills: 0 | OUTPATIENT
Start: 2025-02-03

## 2025-02-03 RX ORDER — LOSARTAN POTASSIUM 50 MG/1
50 TABLET ORAL DAILY
Qty: 90 TABLET | Refills: 0 | OUTPATIENT
Start: 2025-02-03

## 2025-02-03 RX ORDER — METOPROLOL TARTRATE 50 MG
TABLET ORAL
Qty: 135 TABLET | Refills: 0 | OUTPATIENT
Start: 2025-02-03

## 2025-02-04 SDOH — HEALTH STABILITY: PHYSICAL HEALTH: ON AVERAGE, HOW MANY MINUTES DO YOU ENGAGE IN EXERCISE AT THIS LEVEL?: 50 MIN

## 2025-02-04 SDOH — HEALTH STABILITY: PHYSICAL HEALTH: ON AVERAGE, HOW MANY DAYS PER WEEK DO YOU ENGAGE IN MODERATE TO STRENUOUS EXERCISE (LIKE A BRISK WALK)?: 4 DAYS

## 2025-02-04 ASSESSMENT — SOCIAL DETERMINANTS OF HEALTH (SDOH): HOW OFTEN DO YOU GET TOGETHER WITH FRIENDS OR RELATIVES?: TWICE A WEEK

## 2025-02-07 ENCOUNTER — OFFICE VISIT (OUTPATIENT)
Dept: INTERNAL MEDICINE | Facility: CLINIC | Age: 80
End: 2025-02-07
Payer: COMMERCIAL

## 2025-02-07 VITALS
SYSTOLIC BLOOD PRESSURE: 128 MMHG | HEART RATE: 72 BPM | DIASTOLIC BLOOD PRESSURE: 70 MMHG | WEIGHT: 147 LBS | OXYGEN SATURATION: 93 % | HEIGHT: 64 IN | TEMPERATURE: 97.6 F | BODY MASS INDEX: 25.1 KG/M2 | RESPIRATION RATE: 16 BRPM

## 2025-02-07 DIAGNOSIS — R68.89 COLD INTOLERANCE: ICD-10-CM

## 2025-02-07 DIAGNOSIS — R00.0 TACHYCARDIA: ICD-10-CM

## 2025-02-07 DIAGNOSIS — F33.42 MAJOR DEPRESSIVE DISORDER, RECURRENT EPISODE, IN FULL REMISSION: ICD-10-CM

## 2025-02-07 DIAGNOSIS — R32 URINARY INCONTINENCE, UNSPECIFIED TYPE: ICD-10-CM

## 2025-02-07 DIAGNOSIS — Z78.0 MENOPAUSE: ICD-10-CM

## 2025-02-07 DIAGNOSIS — G47.00 INSOMNIA, UNSPECIFIED TYPE: ICD-10-CM

## 2025-02-07 DIAGNOSIS — I10 ESSENTIAL HYPERTENSION: ICD-10-CM

## 2025-02-07 DIAGNOSIS — Z13.220 SCREENING FOR HYPERLIPIDEMIA: ICD-10-CM

## 2025-02-07 DIAGNOSIS — E78.5 HYPERLIPIDEMIA LDL GOAL <130: ICD-10-CM

## 2025-02-07 DIAGNOSIS — Z79.899 MEDICATION MANAGEMENT: ICD-10-CM

## 2025-02-07 DIAGNOSIS — K21.9 GASTROESOPHAGEAL REFLUX DISEASE WITHOUT ESOPHAGITIS: ICD-10-CM

## 2025-02-07 DIAGNOSIS — M05.79 SEROPOSITIVE RHEUMATOID ARTHRITIS OF MULTIPLE SITES (H): ICD-10-CM

## 2025-02-07 DIAGNOSIS — I10 HYPERTENSIVE DISORDER: ICD-10-CM

## 2025-02-07 DIAGNOSIS — Z00.00 ENCOUNTER FOR MEDICARE ANNUAL WELLNESS EXAM: Primary | ICD-10-CM

## 2025-02-07 LAB
ANION GAP SERPL CALCULATED.3IONS-SCNC: 10 MMOL/L (ref 7–15)
BUN SERPL-MCNC: 16.8 MG/DL (ref 8–23)
CALCIUM SERPL-MCNC: 10 MG/DL (ref 8.8–10.4)
CHLORIDE SERPL-SCNC: 102 MMOL/L (ref 98–107)
CHOLEST SERPL-MCNC: 185 MG/DL
CREAT SERPL-MCNC: 0.74 MG/DL (ref 0.51–0.95)
EGFRCR SERPLBLD CKD-EPI 2021: 81 ML/MIN/1.73M2
ERYTHROCYTE [DISTWIDTH] IN BLOOD BY AUTOMATED COUNT: 13 % (ref 10–15)
FASTING STATUS PATIENT QL REPORTED: YES
FASTING STATUS PATIENT QL REPORTED: YES
GLUCOSE SERPL-MCNC: 89 MG/DL (ref 70–99)
HCO3 SERPL-SCNC: 25 MMOL/L (ref 22–29)
HCT VFR BLD AUTO: 38 % (ref 35–47)
HDLC SERPL-MCNC: 67 MG/DL
HGB BLD-MCNC: 12.7 G/DL (ref 11.7–15.7)
LDLC SERPL CALC-MCNC: 104 MG/DL
MCH RBC QN AUTO: 29.5 PG (ref 26.5–33)
MCHC RBC AUTO-ENTMCNC: 33.4 G/DL (ref 31.5–36.5)
MCV RBC AUTO: 88 FL (ref 78–100)
NONHDLC SERPL-MCNC: 118 MG/DL
PLATELET # BLD AUTO: 252 10E3/UL (ref 150–450)
POTASSIUM SERPL-SCNC: 4.2 MMOL/L (ref 3.4–5.3)
RBC # BLD AUTO: 4.3 10E6/UL (ref 3.8–5.2)
SODIUM SERPL-SCNC: 137 MMOL/L (ref 135–145)
TRIGL SERPL-MCNC: 72 MG/DL
WBC # BLD AUTO: 7.2 10E3/UL (ref 4–11)

## 2025-02-07 PROCEDURE — G0439 PPPS, SUBSEQ VISIT: HCPCS | Performed by: INTERNAL MEDICINE

## 2025-02-07 PROCEDURE — G2211 COMPLEX E/M VISIT ADD ON: HCPCS | Performed by: INTERNAL MEDICINE

## 2025-02-07 PROCEDURE — 85027 COMPLETE CBC AUTOMATED: CPT | Performed by: INTERNAL MEDICINE

## 2025-02-07 PROCEDURE — 36415 COLL VENOUS BLD VENIPUNCTURE: CPT | Performed by: INTERNAL MEDICINE

## 2025-02-07 PROCEDURE — 80048 BASIC METABOLIC PNL TOTAL CA: CPT | Performed by: INTERNAL MEDICINE

## 2025-02-07 PROCEDURE — 84443 ASSAY THYROID STIM HORMONE: CPT | Performed by: INTERNAL MEDICINE

## 2025-02-07 PROCEDURE — 99214 OFFICE O/P EST MOD 30 MIN: CPT | Mod: 25 | Performed by: INTERNAL MEDICINE

## 2025-02-07 PROCEDURE — 80061 LIPID PANEL: CPT | Performed by: INTERNAL MEDICINE

## 2025-02-07 RX ORDER — METOPROLOL TARTRATE 50 MG
TABLET ORAL
Qty: 135 TABLET | Refills: 3 | Status: SHIPPED | OUTPATIENT
Start: 2025-02-07

## 2025-02-07 RX ORDER — TRAZODONE HYDROCHLORIDE 50 MG/1
TABLET ORAL
Qty: 90 TABLET | Refills: 3 | Status: SHIPPED | OUTPATIENT
Start: 2025-02-07

## 2025-02-07 RX ORDER — METOPROLOL TARTRATE 50 MG
50 TABLET ORAL 2 TIMES DAILY
Qty: 135 TABLET | Refills: 0 | Status: SHIPPED | OUTPATIENT
Start: 2025-03-10

## 2025-02-07 RX ORDER — OMEPRAZOLE 40 MG/1
40 CAPSULE, DELAYED RELEASE ORAL DAILY
Qty: 90 CAPSULE | Refills: 0 | Status: SHIPPED | OUTPATIENT
Start: 2025-03-10

## 2025-02-07 RX ORDER — LOSARTAN POTASSIUM 50 MG/1
50 TABLET ORAL DAILY
Qty: 90 TABLET | Refills: 3 | Status: SHIPPED | OUTPATIENT
Start: 2025-02-07

## 2025-02-07 RX ORDER — CITALOPRAM HYDROBROMIDE 20 MG/1
20 TABLET ORAL DAILY
Qty: 90 TABLET | Refills: 3 | Status: SHIPPED | OUTPATIENT
Start: 2025-02-07

## 2025-02-07 RX ORDER — CITALOPRAM HYDROBROMIDE 20 MG/1
20 TABLET ORAL DAILY
Qty: 90 TABLET | Refills: 0 | Status: SHIPPED | OUTPATIENT
Start: 2025-03-10

## 2025-02-07 RX ORDER — LOSARTAN POTASSIUM 50 MG/1
50 TABLET ORAL DAILY
Qty: 90 TABLET | Refills: 0 | Status: SHIPPED | OUTPATIENT
Start: 2025-03-10

## 2025-02-07 RX ORDER — OMEPRAZOLE 40 MG/1
CAPSULE, DELAYED RELEASE ORAL
Qty: 90 CAPSULE | Refills: 3 | Status: SHIPPED | OUTPATIENT
Start: 2025-02-07

## 2025-02-07 RX ORDER — PRAVASTATIN SODIUM 10 MG
10 TABLET ORAL DAILY
Qty: 90 TABLET | Refills: 0 | Status: SHIPPED | OUTPATIENT
Start: 2025-03-10

## 2025-02-07 RX ORDER — PRAVASTATIN SODIUM 10 MG
10 TABLET ORAL DAILY
Qty: 90 TABLET | Refills: 3 | Status: SHIPPED | OUTPATIENT
Start: 2025-02-07

## 2025-02-07 RX ORDER — TRAZODONE HYDROCHLORIDE 50 MG/1
50 TABLET ORAL AT BEDTIME
Qty: 90 TABLET | Refills: 0 | Status: SHIPPED | OUTPATIENT
Start: 2025-03-10

## 2025-02-07 RX ORDER — PRAVASTATIN SODIUM 10 MG
10 TABLET ORAL DAILY
Qty: 90 TABLET | Refills: 0 | Status: SHIPPED | OUTPATIENT
Start: 2025-03-10 | End: 2025-02-07

## 2025-02-07 NOTE — PROGRESS NOTES
Preventive Care Visit  [facility]  Gene Scott MD, Internal Medicine  Feb 7, 2025  {Provider  Link to [facility] :[id number]}    Assessment & Plan     {Diag Picklist:192899}    Patient has been advised of split billing requirements and indicates understanding: Yes        Counseling  Appropriate preventive services were addressed with this patient via screening, questionnaire, or discussion as appropriate for fall prevention, nutrition, physical activity, Tobacco-use cessation, social engagement, weight loss and cognition.  Checklist reviewing preventive services available has been given to the patient.  Reviewed patient's diet, addressing concerns and/or questions.   Information on urinary incontinence and treatment options given to patient.       {FOLLOW UP PLANS (Optional) Includes COVID19 Treatment Plan:010348}    Laurita Carter is a 79 year old, presenting for the following:  Medicare Visit        2/7/2025    11:26 AM   Additional Questions   Roomed by Leelee MONTGOMERY CMA     {ROOMER if patient is in their first year of Medicare a vision screen is required click here to document the Vison screen and then refresh the note to pull in results  :551670}      HPI  In addition to an Annual Wellness Exam, we addressed ***          Health Care Directive  Patient does not have a Health Care Directive: Discussed advance care planning with patient; information given to patient to review.      2/4/2025   General Health   How would you rate your overall physical health? Good   Feel stress (tense, anxious, or unable to sleep) Only a little   (!) STRESS CONCERN      2/4/2025   Nutrition   Diet: Regular (no restrictions)         2/4/2025   Exercise   Days per week of moderate/strenous exercise 4 days   Average minutes spent exercising at this level 50 min         2/4/2025   Social Factors   Frequency of gathering with friends or relatives Twice a week   Worry food won't last until get money to buy more No    Food not last or not have enough money for food? No   Do you have housing? (Housing is defined as stable permanent housing and does not include staying ouside in a car, in a tent, in an abandoned building, in an overnight shelter, or couch-surfing.) Yes   Are you worried about losing your housing? No   Lack of transportation? No   Unable to get utilities (heat,electricity)? No         2025   Fall Risk   Fallen 2 or more times in the past year? No    No   Trouble with walking or balance? No    No       Multiple values from one day are sorted in reverse-chronological order          2025   Activities of Daily Living- Home Safety   Needs help with the following daily activites None of the above   Safety concerns in the home None of the above         2025   Dental   Dentist two times every year? Yes         2025   Hearing Screening   Hearing concerns? None of the above         2025   Driving Risk Screening   Patient/family members have concerns about driving No         2025   General Alertness/Fatigue Screening   Have you been more tired than usual lately? No         2025   Urinary Incontinence Screening   Bothered by leaking urine in past 6 months Yes          Today's PHQ-9 Score:       2025    11:07 AM   PHQ-9 SCORE   PHQ-9 Total Score MyChart 0   PHQ-9 Total Score 0        Patient-reported         2025   Substance Use   Alcohol more than 3/day or more than 7/wk No   Do you have a current opioid prescription? No   How severe/bad is pain from 1 to 10? /10   Do you use any other substances recreationally? No     Social History     Tobacco Use    Smoking status: Former     Current packs/day: 0.00     Average packs/day: 0.5 packs/day for 10.0 years (5.0 ttl pk-yrs)     Types: Cigarettes     Start date: 1965     Quit date: 1975     Years since quittin.8     Passive exposure: Past    Smokeless tobacco: Never    Tobacco comments:     Would smoke in social settings   Vaping  Use    Vaping status: Never Used   Substance Use Topics    Alcohol use: Yes     Alcohol/week: 4.0 standard drinks of alcohol     Types: 4 Glasses of wine per week     Comment: Approximately 4 glasses of wine per week    Drug use: No     {Provider  If there are gaps in the social history shown above, please follow the link to update and then refresh the note Link to Social and Substance History :390794}      12/9/2024   LAST FHS-7 RESULTS   1st degree relative breast or ovarian cancer No   Any relative bilateral breast cancer No   Any male have breast cancer No   Any ONE woman have BOTH breast AND ovarian cancer No   Any woman with breast cancer before 50yrs No   2 or more relatives with breast AND/OR ovarian cancer Yes   2 or more relatives with breast AND/OR bowel cancer Yes     {If any of the questions to the FHS7 are answered yes, consider referral for genetic counseling.    Additional indications for genetic referral include personal history of breast or ovarian cancer, genetic mutation in 1st degree relative which increases risk of breast cancer including BRCA1, BRCA2, ENE, PALB 2, TP53, CHEK2, PTEN, CDH1, STK11 (per ACS) and/or 1st degree relative with history of pancreatic or high-risk prostate cancer (per NCCN):219715}   Mammogram Screening - After age 74- determine frequency with patient based on health status, life expectancy and patient goals      {Link to Fracture Risk Assessment Tool (Optional):545987}    {Provider  REQUIRED FOR AWV Use the storyboard to review patient history, after sections have been marked as reviewed, refresh note to capture documentation:352291}  {Provider   REQUIRED AWV use this link to review and update sexual activity history  after section has been marked as reviewed, refresh note to capture documentation:079259}  Reviewed and updated as needed this visit by Provider                    Past medical, family and social histories as well as medications reviewed and updated as  "needed.    Current providers sharing in care for this patient include:  Patient Care Team:  Gene Scott MD as PCP - General (Internal Medicine-Hematology & Oncology)  Karsten Malone MD as MD (Internal Medicine)  Gene Scott MD as Assigned PCP    The following health maintenance items are reviewed in Epic and correct as of today:  Health Maintenance   Topic Date Due    DEPRESSION ACTION PLAN  Never done    RSV VACCINE (1 - 1-dose 75+ series) Never done    COVID-19 Vaccine (5 - 2024-25 season) 09/01/2024    MEDICARE ANNUAL WELLNESS VISIT  11/28/2024    BMP  11/28/2024    LIPID  11/28/2024    ANNUAL REVIEW OF HM ORDERS  11/28/2024    PHQ-9  08/07/2025    FALL RISK ASSESSMENT  02/07/2026    DTAP/TDAP/TD IMMUNIZATION (4 - Td or Tdap) 10/17/2026    GLUCOSE  11/28/2026    ADVANCE CARE PLANNING  04/29/2029    DEXA  10/03/2033    INFLUENZA VACCINE  Completed    Pneumococcal Vaccine: 50+ Years  Completed    ZOSTER IMMUNIZATION  Completed    HPV IMMUNIZATION  Aged Out    MENINGITIS IMMUNIZATION  Aged Out    MAMMO SCREENING  Discontinued    COLORECTAL CANCER SCREENING  Discontinued       REVIEW OF SYSTEMS: The following systems have been completely reviewed and are negative except as noted above:   Constitutional, HEENT, respiratory, cardiovascular, gastrointestinal, genitourinary, musculoskeletal, dermatologic, hematologic, endocrine, psychiatric, and neurologic systems.       Objective    Exam  /70 (BP Location: Right arm, Patient Position: Sitting, Cuff Size: Adult Regular)   Pulse 72   Temp 97.6  F (36.4  C) (Tympanic)   Resp 16   Ht 1.62 m (5' 3.78\")   Wt 66.7 kg (147 lb)   LMP  (LMP Unknown)   SpO2 93%   Breastfeeding No   BMI 25.41 kg/m     Estimated body mass index is 25.41 kg/m  as calculated from the following:    Height as of this encounter: 1.62 m (5' 3.78\").    Weight as of this encounter: 66.7 kg (147 lb).    Physical Exam  {Exam Choices (Optional):540304}        2/7/2025   Mini Cog "   Clock Draw Score 2 Normal   3 Item Recall 3 objects recalled   Mini Cog Total Score 5     {A Mini-Cog total score of 0-2 suggests the possibility of dementia, score of 3-5 suggests no dementia:459705}         Signed Electronically by: Gene Scott MD, MD  {Email feedback regarding this note to [email or web address]   :195832}  Answers submitted by the patient for this visit:  Patient Health Questionnaire (Submitted on 2/7/2025)  If you checked off any problems, how difficult have these problems made it for you to do your work, take care of things at home, or get along with other people?: Not difficult at all  PHQ9 TOTAL SCORE: 0     "Height as of this encounter: 1.62 m (5' 3.78\").    Weight as of this encounter: 66.7 kg (147 lb).    Physical Exam  GENERAL: healthy, alert and no distress  EYES: Eyes grossly normal to inspection, PERRL and conjunctivae and sclerae normal  HENT: ear canals and TM's normal, nose and mouth without ulcers or lesions  NECK: no adenopathy, no asymmetry, masses, or scars and thyroid normal to palpation  RESP: lungs clear to auscultation - no rales, rhonchi or wheezes  BREAST/PELVIC: exams not performed.  CV: regular rate and rhythm, normal S1 S2, no S3 or S4, no murmur, click or rub, no peripheral edema and peripheral pulses strong  ABDOMEN: soft, nontender, no hepatosplenomegaly, no masses and bowel sounds normal  MS: no gross musculoskeletal defects noted, no edema  SKIN: no suspicious lesions or rashes  NEURO: Normal strength and tone, mentation intact and speech normal  PSYCH: mentation appears normal, affect normal/bright          2/7/2025   Mini Cog   Clock Draw Score 2 Normal   3 Item Recall 3 objects recalled   Mini Cog Total Score 5              Signed Electronically by: Gene Scott MD,     Answers submitted by the patient for this visit:  Patient Health Questionnaire (Submitted on 2/7/2025)  If you checked off any problems, how difficult have these problems made it for you to do your work, take care of things at home, or get along with other people?: Not difficult at all  PHQ9 TOTAL SCORE: 0    "

## 2025-02-07 NOTE — PATIENT INSTRUCTIONS
Patient Education   Preventive Care Advice   This is general advice given by our system to help you stay healthy. However, your care team may have specific advice just for you. Please talk to your care team about your preventive care needs.  Nutrition  Eat 5 or more servings of fruits and vegetables each day.  Try wheat bread, brown rice and whole grain pasta (instead of white bread, rice, and pasta).  Get enough calcium and vitamin D. Check the label on foods and aim for 100% of the RDA (recommended daily allowance).  Lifestyle  Exercise at least 150 minutes each week  (30 minutes a day, 5 days a week).  Do muscle strengthening activities 2 days a week. These help control your weight and prevent disease.  No smoking.  Wear sunscreen to prevent skin cancer.  Have a dental exam and cleaning every 6 months.  Yearly exams  See your health care team every year to talk about:  Any changes in your health.  Any medicines your care team has prescribed.  Preventive care, family planning, and ways to prevent chronic diseases.  Shots (vaccines)   HPV shots (up to age 26), if you've never had them before.  Hepatitis B shots (up to age 59), if you've never had them before.  COVID-19 shot: Get this shot when it's due.  Flu shot: Get a flu shot every year.  Tetanus shot: Get a tetanus shot every 10 years.  Pneumococcal, hepatitis A, and RSV shots: Ask your care team if you need these based on your risk.  Shingles shot (for age 50 and up)  General health tests  Diabetes screening:  Starting at age 35, Get screened for diabetes at least every 3 years.  If you are younger than age 35, ask your care team if you should be screened for diabetes.  Cholesterol test: At age 39, start having a cholesterol test every 5 years, or more often if advised.  Bone density scan (DEXA): At age 50, ask your care team if you should have this scan for osteoporosis (brittle bones).  Hepatitis C: Get tested at least once in your life.  STIs (sexually  transmitted infections)  Before age 24: Ask your care team if you should be screened for STIs.  After age 24: Get screened for STIs if you're at risk. You are at risk for STIs (including HIV) if:  You are sexually active with more than one person.  You don't use condoms every time.  You or a partner was diagnosed with a sexually transmitted infection.  If you are at risk for HIV, ask about PrEP medicine to prevent HIV.  Get tested for HIV at least once in your life, whether you are at risk for HIV or not.  Cancer screening tests  Cervical cancer screening: If you have a cervix, begin getting regular cervical cancer screening tests starting at age 21.  Breast cancer scan (mammogram): If you've ever had breasts, begin having regular mammograms starting at age 40. This is a scan to check for breast cancer.  Colon cancer screening: It is important to start screening for colon cancer at age 45.  Have a colonoscopy test every 10 years (or more often if you're at risk) Or, ask your provider about stool tests like a FIT test every year or Cologuard test every 3 years.  To learn more about your testing options, visit:   .  For help making a decision, visit:   https://bit.ly/hn52759.  Prostate cancer screening test: If you have a prostate, ask your care team if a prostate cancer screening test (PSA) at age 55 is right for you.  Lung cancer screening: If you are a current or former smoker ages 50 to 80, ask your care team if ongoing lung cancer screenings are right for you.  For informational purposes only. Not to replace the advice of your health care provider. Copyright   2023 [facility]. All rights reserved. Clinically reviewed by the [facility]. [employer] 758067 - REV 01/24.  Bladder Training: Care Instructions  Your Care Instructions     Bladder training is used to treat urge incontinence and stress incontinence. Urge incontinence means that the need to urinate comes on so fast  that you can't get to a toilet in time. Stress incontinence means that you leak urine because of pressure on your bladder. For example, it may happen when you laugh, cough, or lift something heavy.  Bladder training can increase how long you can wait before you have to urinate. It can also help your bladder hold more urine. And it can give you better control over the urge to urinate.  It is important to remember that bladder training takes a few weeks to a few months to make a difference. You may not see results right away, but don't give up.  Follow-up care is a key part of your treatment and safety. Be sure to make and go to all appointments, and call your doctor if you are having problems. It's also a good idea to know your test results and keep a list of the medicines you take.  How can you care for yourself at home?  Work with your doctor to come up with a bladder training program that is right for you. You may use one or more of the following methods.  Delayed urination  In the beginning, try to keep from urinating for 5 minutes after you first feel the need to go.  While you wait, take deep, slow breaths to relax. Kegel exercises can also help you delay the need to go to the bathroom.  After some practice, when you can easily wait 5 minutes to urinate, try to wait 10 minutes before you urinate.  Slowly increase the waiting period until you are able to control when you have to urinate.  Scheduled urination  Empty your bladder when you first wake up in the morning.  Schedule times throughout the day when you will urinate.  Start by going to the bathroom every hour, even if you don't need to go.  Slowly increase the time between trips to the bathroom.  When you have found a schedule that works well for you, keep doing it.  If you wake up during the night and have to urinate, do it. Apply your schedule to waking hours only.  Kegel exercises  These tighten and strengthen pelvic muscles, which can help you control  "the flow of urine. (If doing these exercises causes pain, stop doing them and talk with your doctor.) To do Kegel exercises:  Squeeze your muscles as if you were trying not to pass gas. Or squeeze your muscles as if you were stopping the flow of urine. Your belly, legs, and buttocks shouldn't move.  Hold the squeeze for 3 seconds, then relax for 5 to 10 seconds.  Start with 3 seconds, then add 1 second each week until you are able to squeeze for 10 seconds.  Repeat the exercise 10 times a session. Do 3 to 8 sessions a day.  When should you call for help?  Watch closely for changes in your health, and be sure to contact your doctor if:    Your incontinence is getting worse.     You do not get better as expected.   Where can you learn more?  Go to https://www.[employer].net/patiented  Enter V684 in the search box to learn more about \"Bladder Training: Care Instructions.\"  Current as of: April 30, 2024  Content Version: 14.3    2024 [employer]   Care instructions adapted under license by your healthcare professional. If you have questions about a medical condition or this instruction, always ask your healthcare professional. [employer] disclaims any warranty or liability for your use of this information.       Patient Instructions   Everything looks fine.    Refills of medications have been faxed to your pharmacies.     Lab results will be available soon on [employer]    Someone will contact you to help you to schedule a bone density test and a Urogynecology consult for urinary leakage.      See me in a year, sooner if problems.     "

## 2025-02-08 LAB — TSH SERPL DL<=0.005 MIU/L-ACNC: 1.92 UIU/ML (ref 0.3–4.2)

## 2025-02-10 ENCOUNTER — PATIENT OUTREACH (OUTPATIENT)
Dept: CARE COORDINATION | Facility: CLINIC | Age: 80
End: 2025-02-10
Payer: COMMERCIAL

## 2025-04-29 ENCOUNTER — TRANSFERRED RECORDS (OUTPATIENT)
Dept: HEALTH INFORMATION MANAGEMENT | Facility: CLINIC | Age: 80
End: 2025-04-29
Payer: COMMERCIAL

## 2025-08-21 ENCOUNTER — TRANSFERRED RECORDS (OUTPATIENT)
Dept: HEALTH INFORMATION MANAGEMENT | Facility: CLINIC | Age: 80
End: 2025-08-21
Payer: COMMERCIAL

## (undated) DEVICE — ENDO SNARE EXACTO COLD 9MM LOOP 2.4MMX230CM 00711115

## (undated) DEVICE — BAG CLEAR TRASH 1.3M 39X33" P4040C

## (undated) DEVICE — LINEN FULL SHEET 5511

## (undated) DEVICE — PAD CHUX UNDERPAD 30X36" P3036C

## (undated) DEVICE — SOL WATER IRRIG 1000ML BOTTLE 2F7114

## (undated) DEVICE — GOWN XLG DISP 9545

## (undated) DEVICE — TUBING SUCTION 6"X3/16" N56A

## (undated) DEVICE — SUCTION CANISTER MEDIVAC LINER 3000ML W/LID 65651-530

## (undated) DEVICE — LINEN HALF SHEET 5512

## (undated) RX ORDER — DEXAMETHASONE SODIUM PHOSPHATE 4 MG/ML
INJECTION, SOLUTION INTRA-ARTICULAR; INTRALESIONAL; INTRAMUSCULAR; INTRAVENOUS; SOFT TISSUE
Status: DISPENSED
Start: 2018-11-09

## (undated) RX ORDER — PROPOFOL 10 MG/ML
INJECTION, EMULSION INTRAVENOUS
Status: DISPENSED
Start: 2018-11-09

## (undated) RX ORDER — ONDANSETRON 2 MG/ML
INJECTION INTRAMUSCULAR; INTRAVENOUS
Status: DISPENSED
Start: 2018-11-09

## (undated) RX ORDER — LIDOCAINE HYDROCHLORIDE 10 MG/ML
INJECTION, SOLUTION EPIDURAL; INFILTRATION; INTRACAUDAL; PERINEURAL
Status: DISPENSED
Start: 2018-11-09

## (undated) RX ORDER — GLYCOPYRROLATE 0.2 MG/ML
INJECTION INTRAMUSCULAR; INTRAVENOUS
Status: DISPENSED
Start: 2018-11-09

## (undated) RX ORDER — FENTANYL CITRATE 50 UG/ML
INJECTION, SOLUTION INTRAMUSCULAR; INTRAVENOUS
Status: DISPENSED
Start: 2018-11-09